# Patient Record
Sex: FEMALE | NOT HISPANIC OR LATINO | Employment: UNEMPLOYED | URBAN - METROPOLITAN AREA
[De-identification: names, ages, dates, MRNs, and addresses within clinical notes are randomized per-mention and may not be internally consistent; named-entity substitution may affect disease eponyms.]

---

## 2017-01-24 ENCOUNTER — APPOINTMENT (OUTPATIENT)
Dept: DIABETES SERVICES | Facility: HOSPITAL | Age: 77
End: 2017-01-24

## 2017-01-24 PROCEDURE — G0109 DIAB MANAGE TRN IND/GROUP: HCPCS

## 2017-02-14 PROBLEM — H40.10X0 OPEN-ANGLE GLAUCOMA: Status: ACTIVE | Noted: 2017-02-14

## 2017-02-17 ENCOUNTER — OFFICE VISIT (OUTPATIENT)
Dept: FAMILY MEDICINE CLINIC | Facility: CLINIC | Age: 77
End: 2017-02-17

## 2017-02-17 VITALS
BODY MASS INDEX: 28.22 KG/M2 | HEIGHT: 59 IN | OXYGEN SATURATION: 99 % | SYSTOLIC BLOOD PRESSURE: 110 MMHG | WEIGHT: 140 LBS | TEMPERATURE: 97.4 F | HEART RATE: 77 BPM | DIASTOLIC BLOOD PRESSURE: 80 MMHG

## 2017-02-17 DIAGNOSIS — M15.9 OSTEOARTHRITIS INVOLVING MULTIPLE JOINTS ON BOTH SIDES OF BODY: ICD-10-CM

## 2017-02-17 DIAGNOSIS — E11.9 DIABETES MELLITUS TYPE 2, NONINSULIN DEPENDENT (HCC): Primary | ICD-10-CM

## 2017-02-17 DIAGNOSIS — I10 ESSENTIAL HYPERTENSION: ICD-10-CM

## 2017-02-17 PROCEDURE — 99214 OFFICE O/P EST MOD 30 MIN: CPT | Performed by: FAMILY MEDICINE

## 2017-02-17 RX ORDER — NABUMETONE 750 MG/1
750 TABLET, FILM COATED ORAL 2 TIMES DAILY PRN
Qty: 60 TABLET | Refills: 5 | Status: SHIPPED | OUTPATIENT
Start: 2017-02-17 | End: 2017-09-05 | Stop reason: SDUPTHER

## 2017-02-17 RX ORDER — MONTELUKAST SODIUM 10 MG/1
10 TABLET ORAL NIGHTLY
Qty: 30 TABLET | Refills: 5 | Status: SHIPPED | OUTPATIENT
Start: 2017-02-17 | End: 2017-09-05 | Stop reason: SDUPTHER

## 2017-02-17 RX ORDER — LOSARTAN POTASSIUM AND HYDROCHLOROTHIAZIDE 25; 100 MG/1; MG/1
1 TABLET ORAL DAILY
Qty: 30 TABLET | Refills: 5 | Status: SHIPPED | OUTPATIENT
Start: 2017-02-17 | End: 2017-09-05 | Stop reason: SDUPTHER

## 2017-02-17 RX ORDER — AMLODIPINE BESYLATE 5 MG/1
5 TABLET ORAL DAILY
Qty: 30 TABLET | Refills: 5 | Status: SHIPPED | OUTPATIENT
Start: 2017-02-17 | End: 2017-09-05 | Stop reason: SDUPTHER

## 2017-02-17 RX ORDER — LATANOPROST 50 UG/ML
1 SOLUTION/ DROPS OPHTHALMIC DAILY
COMMUNITY
Start: 2017-01-30 | End: 2022-01-25 | Stop reason: ALTCHOICE

## 2017-02-17 NOTE — PROGRESS NOTES
Subjective   Antonietta Gramajo is a 76 y.o. female.   Chief Complaint   Patient presents with   • Diabetes   • Hypertension   • Osteoarthritis       History of Present Illness     #1 diabetes type 2-diagnosed years ago. Patient is on Janumet 50/1000 twice a day. She did not tolerate Invokana in the past- it was making her nauseated and caused rash. She cannot take sulfa drugs. She had angioedema.   Patient did diabetes education after last office visit.  She is working on applying it to her lifestyle.  She started walking daily.  She walks 30-60 minutes a day.  AM sugars in the 170s.  FBS in the lab- 175. A1C at 8.2 from 8.8.   Eye exam-2/2017.  Patient is not on statin due to abdominal pain. Patient tried 3 different statins.      #2 hypertension-on losartan/HCTZ 100/25 mg a day and amlodipine 5 mg -she takes it every other day. She does not tolerate amlodipine 5 mg every day because it makes her very tired. Patient denies chest pain, shortness of breath, dizziness, palpitations. No change with LE edema. Kidney tests are normal.      #3 osteoarthritis- shoulders hands and knees. Pain is achy, it comes and goes. It is better with nabumetone. Patient is using it as needed, twice a day. If she forgets to take it she gets pain. It helps her to function. She had seen no blood in stool, no black stools. She reports no abdominal pain.     The following portions of the patient's history were reviewed and updated as appropriate: allergies, current medications, past family history, past medical history, past social history and problem list.    Review of Systems   Constitutional: Negative.    Respiratory: Negative.    Cardiovascular: Negative.    Gastrointestinal: Negative.          Objective   Wt Readings from Last 3 Encounters:   02/17/17 140 lb (63.5 kg)   09/16/16 146 lb (66.2 kg)   10/16/15 149 lb 0.1 oz (67.6 kg)      Vitals:    02/17/17 1306   BP: 110/80   Pulse: 77   Temp: 97.4 °F (36.3 °C)   SpO2: 99%     Temp Readings  from Last 3 Encounters:   02/17/17 97.4 °F (36.3 °C)   09/16/16 97.6 °F (36.4 °C)   10/16/15 98.3 °F (36.8 °C)     BP Readings from Last 3 Encounters:   02/17/17 110/80   09/16/16 128/74   10/16/15 110/70     Pulse Readings from Last 3 Encounters:   02/17/17 77   09/16/16 78   10/16/15 82       Physical Exam   Constitutional: She is oriented to person, place, and time. She appears well-developed and well-nourished.   HENT:   Head: Normocephalic and atraumatic.   Neck: Neck supple. Carotid bruit is not present. No thyromegaly present.   Cardiovascular: Normal rate, regular rhythm, normal heart sounds and intact distal pulses.    Pulmonary/Chest: Effort normal and breath sounds normal.   Neurological: She is alert and oriented to person, place, and time.   Skin: Skin is warm, dry and intact.   Psychiatric: She has a normal mood and affect. Her behavior is normal.       Assessment/Plan   Antonietta was seen today for diabetes, hypertension and osteoarthritis.    Diagnoses and all orders for this visit:    Diabetes mellitus type 2, noninsulin dependent  -     Ambulatory Referral to Endocrinology    Essential hypertension    Osteoarthritis involving multiple joints on both sides of body    Other orders  -     nabumetone (RELAFEN) 750 MG tablet; Take 1 tablet by mouth 2 (Two) Times a Day As Needed for mild pain (1-3).  -     losartan-hydrochlorothiazide (HYZAAR) 100-25 MG per tablet; Take 1 tablet by mouth Daily.  -     sitaGLIPtin-metFORMIN (JANUMET)  MG per tablet; Take 1 tablet by mouth 2 (Two) Times a Day With Meals.  -     amLODIPine (NORVASC) 5 MG tablet; Take 1 tablet by mouth Daily.  -     montelukast (SINGULAIR) 10 MG tablet; Take 1 tablet by mouth Every Night.        #1 diabetes type [id number] to endocrinologist.    #2 hypertension-controlled.  Continue same.  Follow-up in 6 months.    #3 osteoarthritis-continue current treatment.  Side effects reviewed.  Follow-up in 6 months.    #4 allergic  rhinitis-continue same.

## 2017-04-10 ENCOUNTER — OFFICE VISIT (OUTPATIENT)
Dept: ENDOCRINOLOGY | Age: 77
End: 2017-04-10

## 2017-04-10 VITALS
DIASTOLIC BLOOD PRESSURE: 76 MMHG | WEIGHT: 139 LBS | BODY MASS INDEX: 28.02 KG/M2 | SYSTOLIC BLOOD PRESSURE: 124 MMHG | HEIGHT: 59 IN

## 2017-04-10 DIAGNOSIS — IMO0002 UNCONTROLLED TYPE 2 DIABETES MELLITUS WITH COMPLICATION, WITHOUT LONG-TERM CURRENT USE OF INSULIN: Primary | ICD-10-CM

## 2017-04-10 DIAGNOSIS — I10 ESSENTIAL HYPERTENSION: ICD-10-CM

## 2017-04-10 DIAGNOSIS — E67.3 HYPERVITAMINOSIS D: ICD-10-CM

## 2017-04-10 PROCEDURE — 99204 OFFICE O/P NEW MOD 45 MIN: CPT | Performed by: NURSE PRACTITIONER

## 2017-04-10 RX ORDER — LANCETS
EACH MISCELLANEOUS
Qty: 100 EACH | Refills: 5 | Status: SHIPPED | OUTPATIENT
Start: 2017-04-10 | End: 2017-06-02

## 2017-04-10 RX ORDER — EMPAGLIFLOZIN 10 MG/1
10 TABLET, FILM COATED ORAL EVERY MORNING
Qty: 30 TABLET | Refills: 3 | Status: SHIPPED | OUTPATIENT
Start: 2017-04-10 | End: 2017-09-07 | Stop reason: SDUPTHER

## 2017-04-10 NOTE — PROGRESS NOTES
Antonietta Gramajo who presents for for evaluation and treatment of Type 2 diabetes mellitus insulin dependent.The initial diagnosis of diabetes was made 7-8 years ago.     The condition of diabetes location is system with the course being clinical course has fluctuated , the severity is Mild , and the modifying/allievating factors are  oral medications. Insulin dosage review with Antonietta Gramajo suggested compliance most of the time   .       Associated symptoms of hyperglycemia have been none.  Associated symptoms of hypoglycemia have been jitteriness and weakness and fatigue. Patient reports  hypoglycemia threshold for symptoms is 60 mg/dl .       The patient is currently taking home blood tests - Blood glucose testin-1 times daily, that are: fasting- 1st thing in morning before eating or drinking     Compliance with blood glucose monitoring: poor.  Exercise:intermittently with meal panning: The patient is using no plan.    Home blood glucose testing daily: 0-1  FB to 210 mg/dl *patient states she onlyt tests maybe 1x weekly.    Patterns reported per patient are that she doesn't test her B/S but once weekly if that.      The following portions of the patient's history were reviewed and updated as appropriate: current medications, past family history, past medical history, past social history, past surgical history and problem list.        Medical records, chart, and labs reviewed from primary care provider.      Current Outpatient Prescriptions:   •  albuterol (PROAIR HFA) 108 (90 BASE) MCG/ACT inhaler, ProAir  (90 Base) MCG/ACT Inhalation Aerosol Solution; Patient Sig: ProAir  (90 Base) MCG/ACT Inhalation Aerosol Solution ; 0; -Oct-2014; Active, Disp: , Rfl:   •  amLODIPine (NORVASC) 5 MG tablet, Take 1 tablet by mouth Daily., Disp: 30 tablet, Rfl: 5  •  budesonide-formoterol (SYMBICORT) 160-4.5 MCG/ACT inhaler, Symbicort 160-4.5 MCG/ACT Inhalation Aerosol; Patient Sig: Symbicort 160-4.5  MCG/ACT Inhalation Aerosol ; 0; 20-Oct-2014; Active, Disp: , Rfl:   •  latanoprost (XALATAN) 0.005 % ophthalmic solution, , Disp: , Rfl:   •  losartan-hydrochlorothiazide (HYZAAR) 100-25 MG per tablet, Take 1 tablet by mouth Daily., Disp: 30 tablet, Rfl: 5  •  montelukast (SINGULAIR) 10 MG tablet, Take 1 tablet by mouth Every Night., Disp: 30 tablet, Rfl: 5  •  nabumetone (RELAFEN) 750 MG tablet, Take 1 tablet by mouth 2 (Two) Times a Day As Needed for mild pain (1-3)., Disp: 60 tablet, Rfl: 5  •  Omeprazole ([employer] OMEPRAZOLE) 20 MG tablet delayed-release, Take  by mouth., Disp: , Rfl:   •  sitaGLIPtin-metFORMIN (JANUMET)  MG per tablet, Take 1 tablet by mouth 2 (Two) Times a Day With Meals., Disp: 180 tablet, Rfl: 0  •  Triamcinolone Acetonide (NASACORT) 55 MCG/ACT nasal inhaler, 2 sprays into each nostril daily., Disp: , Rfl:   •  glucose blood test strip, Check blood glucose 3-4 times dialy, Disp: 100 each, Rfl: 4  •  JARDIANCE 10 MG tablet, Take 10 mg by mouth Every Morning., Disp: 30 tablet, Rfl: 3  •  Lancets (ONETOUCH ULTRASOFT) lancets, Check BG 3 times daily, Disp: 100 each, Rfl: 5    Patient Active Problem List    Diagnosis   • Uncontrolled type 2 diabetes mellitus with complication, without long-term current use of insulin [E11.8, E11.65]   • Open-angle glaucoma [H40.10X0]     Overview Note:     dgn 2/4/17. Dr Mcdowell.     • Allergic rhinitis [J30.9]   • Acid reflux [K21.9]   • Essential hypertension [I10]   • Osteoarthritis of knee [M17.9]   • Diabetes mellitus type 2, noninsulin dependent [E11.9]   • Avitaminosis D [E55.9]       Review of Systems  A comprehensive review of the 14 systems was negative except of listed below:  Constitutional: fatigue and weight loss 5 months- steadly losing. no appetite* lb in 5 months**   Eyes: positive for visual disturbance  Genitourinary:positive for frequency and nocturia  Musculoskeletal:positive for muscle weakness and myalgias - location is  "generalized  Endocrine: diabetic symptoms including blurry vision, increased fatigue, polydipsia, polyuria and weight loss  temperature  cold intolerance  weight loss  hyperglycemia     Objective:     Wt Readings from Last 3 Encounters:   04/10/17 139 lb (63 kg)   02/17/17 140 lb (63.5 kg)   09/16/16 146 lb (66.2 kg)     Temp Readings from Last 3 Encounters:   02/17/17 97.4 °F (36.3 °C)   09/16/16 97.6 °F (36.4 °C)   10/16/15 98.3 °F (36.8 °C)     BP Readings from Last 3 Encounters:   04/10/17 124/76   02/17/17 110/80   09/16/16 128/74     Pulse Readings from Last 3 Encounters:   02/17/17 77   09/16/16 78   10/16/15 82         /76  Ht 59\" (149.9 cm)  Wt 139 lb (63 kg)  BMI 28.07 kg/m2    General appearance:  alert, cooperative,orientated, , fatigued, nourished\" \"appears stated age and cooperative\" \"NAD   Neck: no carotid bruit, supple, symmetrical, trachea midline and thyroid not enlarged, symmetric, no tenderness/mass/nodules   Thyroid:  no palpable nodule, no tenderness, no enlargement,    Lung:  \"clear to auscultation bilaterally\" \"no abnormal breath sounds  \" effort was normal, no labored breath, no use of accessory muscles.   Heart: regular rate and rhythm, S1, S2 normal, no murmur, click, rub or gallop      Abdomen:  normal bowel sounds- 4 quads, soft non-tender and contour - slt rounded      Extremities: [extremities normal, atraumatic, no cyanosis or edema\" WNL - gait and station, strength and stability\"          Skin:   Pulses:  warm and dry, no hyperpigmentation, normal skin coloring, or suspicious lesions  2+ and symmetric   Neuro: Alert and oriented x3. Gait normal. Reflexes and motor strength normal and symmetric. Cranial nerves 2-12 and sensation grossly intact.      Psych: behavior - normal, judgement - normal, mood - normal, affect - normal               Diabetic foot exam:   Left: Filament test present   Pulses Dorsalis Pedis:  present  Posterior Tibial:  present   Reflexes 3+    Vibratory " sensation normal   Proprioception normal   Sharp/dull discrimination normal       Right: Filament test present   Pulses Dorsalis Pedis:  present  Posterior Tibial:  present   Reflexes 3+    Vibratory sensation normal   Proprioception normal   Sharp/dull discrimination normal    Lab Review  Results for orders placed or performed in visit on 12/16/16   Hemoglobin A1c   Result Value Ref Range    Hemoglobin A1C 8.20 (H) 4.80 - 5.60 %   Basic Metabolic Panel   Result Value Ref Range    Glucose 175 (H) 65 - 99 mg/dL    BUN 17 8 - 23 mg/dL    Creatinine 0.80 0.57 - 1.00 mg/dL    eGFR Non African Am 70 >60 mL/min/1.73    eGFR African Am 85 >60 mL/min/1.73    BUN/Creatinine Ratio 21.3 7.0 - 25.0    Sodium 142 136 - 145 mmol/L    Potassium 4.2 3.5 - 5.2 mmol/L    Chloride 99 98 - 107 mmol/L    Total CO2 25.8 22.0 - 29.0 mmol/L    Calcium 9.8 8.6 - 10.5 mg/dL         Assessment:   Antonietta was seen today for diabetes.    Diagnoses and all orders for this visit:    Uncontrolled type 2 diabetes mellitus with complication, without long-term current use of insulin  -     Comprehensive Metabolic Panel  -     C-Peptide  -     Hemoglobin A1c  -     Insulin, Total  -     Lipid Panel  -     Microalbumin / Creatinine Urine Ratio  -     TSH  -     Thyroid Antibodies  -     T4, Free  -     T4  -     T3, Free  -     T3  -     Vitamin D 25 Hydroxy  -     JARDIANCE 10 MG tablet; Take 10 mg by mouth Every Morning.  -     Lancets (ONETOUCH ULTRASOFT) lancets; Check BG 3 times daily  -     glucose blood test strip; Check blood glucose 3-4 times dialy    Essential hypertension  -     Comprehensive Metabolic Panel  -     C-Peptide  -     Hemoglobin A1c  -     Insulin, Total  -     Lipid Panel  -     Microalbumin / Creatinine Urine Ratio  -     TSH  -     Thyroid Antibodies  -     T4, Free  -     T4  -     T3, Free  -     T3    Hypervitaminosis D   -     Vitamin D 25 Hydroxy          Plan:    In Summary:   This is a type II diabetic patient that  reports to the office as a new patient to be evaluated and treated.  She reports that she was diagnosed approximately 8 years ago.  She has not kept up with her diabetes and does not check her blood sugars on a regular basis.  She reports minimal symptoms of hyperglycemia with the exception of within the last month she is having more visual changes and increased fatigue.he denies any family history she denies any family history of diabetes, but does have significant cancer and both brother and sister with cardiovascular history with mother and father.  Her surgical history was reviewed as well as her medical history of cataracts diverticulitis and skin cancer.  Her problem and diagnosis list is essential hypertension, allergic rhinitis, GERD item and D uncontrolled type 2 diabetes open angle glycol, and osteoarthritis.  Patient's last laboratory workup was in December 2016.  With no blood glucose checking on a weekly basis.  Her primary care notes were evaluated as well as her history.  At this time we will obtain a detailed laboratory workup and treat as indicated with those results.  The medication changes today were as follows:Start the Jardiance 10mg - agree to try this medication even with the rash with Invokana and start the Janumet 50/1000mg twice daily this and dinner.  We will have her return in 2 weeks to evaluate all lab work and determine basal insulin needs and further treatment options.      Education:  interpretation of lab results, blood sugar goals, complications of diabetes mellitus, hypoglycemia prevention and treatment, exercise, illness management, self-monitoring of blood glucose skills, nutrition and carbohydrate counting    home blood tests -  Blood glucose testing: 3-4 times daily, that are: fasting- 1st thing in morning before eating or drinking, before each meal and 1 or 2 hours after meal  Bedtime, anytime you feel symptoms of hyperglycemia or hypoglycemia (high or low blood sugars)        Return in about 2 weeks (around 4/24/2017).    Prior authorization information.  Patient has had trouble with Invokana in the past-rash only.  Actos gastrointestinal problems.  Metformin gastrointestinal problems.  Can't take Janumet with no problems.    Dragon transcription disclaimer     Much of this encounter note is an electronic transcription/translation of spoken language to printed text. The electronic translation of spoken language may permit erroneous, or at times, nonsensical words or phrases to be inadvertently transcribed. Although I have reviewed the note for such errors, some may still exist.

## 2017-04-10 NOTE — PATIENT INSTRUCTIONS
home blood tests -  Blood glucose testing: 3-4 times daily, that are: fasting- 1st thing in morning before eating or drinking, before each meal and 1 or 2 hours after meal  Bedtime, anytime you feel symptoms of hyperglycemia or hypoglycemia (high or low blood sugars)     Start the Jardiance 10mg  Start the Janumet 50/1000mg twice daily this and dinner

## 2017-04-11 LAB
25(OH)D3+25(OH)D2 SERPL-MCNC: 30.2 NG/ML (ref 30–100)
ALBUMIN SERPL-MCNC: 4.3 G/DL (ref 3.5–5.2)
ALBUMIN/CREAT UR: 16.2 MG/G CREAT (ref 0–30)
ALBUMIN/GLOB SERPL: 1.3 G/DL
ALP SERPL-CCNC: 93 U/L (ref 39–117)
ALT SERPL-CCNC: 18 U/L (ref 1–33)
AST SERPL-CCNC: 16 U/L (ref 1–32)
BILIRUB SERPL-MCNC: 0.3 MG/DL (ref 0.1–1.2)
BUN SERPL-MCNC: 19 MG/DL (ref 8–23)
BUN/CREAT SERPL: 22.9 (ref 7–25)
C PEPTIDE SERPL-MCNC: 2.7 NG/ML (ref 1.1–4.4)
CALCIUM SERPL-MCNC: 10.2 MG/DL (ref 8.6–10.5)
CHLORIDE SERPL-SCNC: 100 MMOL/L (ref 98–107)
CHOLEST SERPL-MCNC: 193 MG/DL (ref 0–200)
CO2 SERPL-SCNC: 25.2 MMOL/L (ref 22–29)
CREAT SERPL-MCNC: 0.83 MG/DL (ref 0.57–1)
CREAT UR-MCNC: 39.5 MG/DL
GLOBULIN SER CALC-MCNC: 3.2 GM/DL
GLUCOSE SERPL-MCNC: 178 MG/DL (ref 65–99)
HBA1C MFR BLD: 7.75 % (ref 4.8–5.6)
HDLC SERPL-MCNC: 73 MG/DL (ref 40–60)
INSULIN SERPL-ACNC: 8.7 UIU/ML (ref 2.6–24.9)
LDLC SERPL CALC-MCNC: 93 MG/DL (ref 0–100)
MICROALBUMIN UR-MCNC: 6.4 UG/ML
POTASSIUM SERPL-SCNC: 3.8 MMOL/L (ref 3.5–5.2)
PROT SERPL-MCNC: 7.5 G/DL (ref 6–8.5)
SODIUM SERPL-SCNC: 141 MMOL/L (ref 136–145)
T3 SERPL-MCNC: 79.6 NG/DL (ref 80–200)
T3FREE SERPL-MCNC: 2.2 PG/ML (ref 2–4.4)
T4 FREE SERPL-MCNC: 1.79 NG/DL (ref 0.93–1.7)
T4 SERPL-MCNC: 9.05 MCG/DL (ref 4.5–11.7)
THYROGLOB AB SERPL-ACNC: <1 IU/ML (ref 0–0.9)
THYROPEROXIDASE AB SERPL-ACNC: 12 IU/ML (ref 0–34)
TRIGL SERPL-MCNC: 134 MG/DL (ref 0–150)
TSH SERPL DL<=0.005 MIU/L-ACNC: 1.08 MIU/ML (ref 0.27–4.2)
VLDLC SERPL CALC-MCNC: 26.8 MG/DL (ref 5–40)

## 2017-06-02 ENCOUNTER — TELEPHONE (OUTPATIENT)
Dept: ENDOCRINOLOGY | Age: 77
End: 2017-06-02

## 2017-06-02 RX ORDER — LANCETS 33 GAUGE
EACH MISCELLANEOUS
Qty: 100 EACH | Refills: 5 | Status: SHIPPED | OUTPATIENT
Start: 2017-06-02

## 2017-06-02 NOTE — TELEPHONE ENCOUNTER
----- Message from Isabelle Humphries sent at 6/1/2017  9:28 AM EDT -----  Contact: [facility]ST SAYS THAT A SCRIPT FOR ONE TOUCH ULTRA SOFT LANCETS WAS SENT TO THEM BUT THE PATIENT USES ONE TOUCH DELICA LANCETS. SINCE THIS IS MEDICARE, THE SCRIPT NEEDS TO BE RESENT WITH THE NUMBER OF TIMES SHE TESTS, THE CORRECT BRAND OF LANCETS AND THE DIAGNOSIS CODE. PLEASE SEND THIS TO:  ALESS[facility] - [id number] [facility] [place]   [phone] (Phone)  [phone] (Fax)

## 2017-07-07 RX ORDER — SITAGLIPTIN AND METFORMIN HYDROCHLORIDE 1000; 50 MG/1; MG/1
TABLET, FILM COATED ORAL
Qty: 60 TABLET | Refills: 0 | Status: SHIPPED | OUTPATIENT
Start: 2017-07-07 | End: 2017-08-08 | Stop reason: SDUPTHER

## 2017-08-08 RX ORDER — SITAGLIPTIN AND METFORMIN HYDROCHLORIDE 1000; 50 MG/1; MG/1
TABLET, FILM COATED ORAL
Qty: 60 TABLET | Refills: 0 | Status: SHIPPED | OUTPATIENT
Start: 2017-08-08 | End: 2017-09-12 | Stop reason: SDUPTHER

## 2017-08-29 DIAGNOSIS — I10 ESSENTIAL HYPERTENSION: Primary | ICD-10-CM

## 2017-08-29 DIAGNOSIS — I10 ESSENTIAL HYPERTENSION: ICD-10-CM

## 2017-08-30 LAB
BUN SERPL-MCNC: 26 MG/DL (ref 8–23)
BUN/CREAT SERPL: 23.6 (ref 7–25)
CALCIUM SERPL-MCNC: 9.7 MG/DL (ref 8.6–10.5)
CHLORIDE SERPL-SCNC: 98 MMOL/L (ref 98–107)
CO2 SERPL-SCNC: 27 MMOL/L (ref 22–29)
CREAT SERPL-MCNC: 1.1 MG/DL (ref 0.57–1)
GLUCOSE SERPL-MCNC: 143 MG/DL (ref 65–99)
POTASSIUM SERPL-SCNC: 4.5 MMOL/L (ref 3.5–5.2)
SODIUM SERPL-SCNC: 140 MMOL/L (ref 136–145)

## 2017-09-05 ENCOUNTER — OFFICE VISIT (OUTPATIENT)
Dept: INTERNAL MEDICINE | Facility: CLINIC | Age: 77
End: 2017-09-05

## 2017-09-05 VITALS
DIASTOLIC BLOOD PRESSURE: 64 MMHG | OXYGEN SATURATION: 98 % | WEIGHT: 139 LBS | TEMPERATURE: 98.4 F | SYSTOLIC BLOOD PRESSURE: 124 MMHG | HEART RATE: 78 BPM | HEIGHT: 59 IN | BODY MASS INDEX: 28.02 KG/M2

## 2017-09-05 DIAGNOSIS — I10 ESSENTIAL HYPERTENSION: Primary | ICD-10-CM

## 2017-09-05 DIAGNOSIS — M17.0 OSTEOARTHRITIS OF BOTH KNEES, UNSPECIFIED OSTEOARTHRITIS TYPE: ICD-10-CM

## 2017-09-05 PROCEDURE — G0009 ADMIN PNEUMOCOCCAL VACCINE: HCPCS | Performed by: FAMILY MEDICINE

## 2017-09-05 PROCEDURE — 99213 OFFICE O/P EST LOW 20 MIN: CPT | Performed by: FAMILY MEDICINE

## 2017-09-05 PROCEDURE — 90670 PCV13 VACCINE IM: CPT | Performed by: FAMILY MEDICINE

## 2017-09-05 RX ORDER — LOSARTAN POTASSIUM AND HYDROCHLOROTHIAZIDE 25; 100 MG/1; MG/1
1 TABLET ORAL DAILY
Qty: 30 TABLET | Refills: 5 | Status: SHIPPED | OUTPATIENT
Start: 2017-09-05 | End: 2018-02-17 | Stop reason: SDUPTHER

## 2017-09-05 RX ORDER — NABUMETONE 750 MG/1
750 TABLET, FILM COATED ORAL 2 TIMES DAILY PRN
Qty: 60 TABLET | Refills: 5 | Status: SHIPPED | OUTPATIENT
Start: 2017-09-05 | End: 2018-03-06 | Stop reason: SDUPTHER

## 2017-09-05 RX ORDER — MONTELUKAST SODIUM 10 MG/1
10 TABLET ORAL NIGHTLY
Qty: 30 TABLET | Refills: 5 | Status: SHIPPED | OUTPATIENT
Start: 2017-09-05 | End: 2018-03-06 | Stop reason: SDUPTHER

## 2017-09-05 RX ORDER — AMLODIPINE BESYLATE 5 MG/1
5 TABLET ORAL DAILY
Qty: 30 TABLET | Refills: 5 | Status: SHIPPED | OUTPATIENT
Start: 2017-09-05 | End: 2018-03-06 | Stop reason: SDUPTHER

## 2017-09-05 NOTE — PROGRESS NOTES
Subjective   Antonietta Gramajo is a 77 y.o. female.   Chief Complaint   Patient presents with   • Osteoarthritis   • Hypertension       History of Present Illness     #1 hypertension-on losartan/HCTZ 100/25 mg a day and amlodipine 5 mg -she takes it every other day. She does not tolerate amlodipine 5 mg every day because it makes her very tired. Patient denies chest pain, shortness of breath, dizziness, palpitations. No change with LE edema. Creatinine 1.1, but BUN is at 26.  She walks almost daily for an hour.      #2 osteoarthritis- shoulders hands and knees. Pain is achy, it comes and goes. It is better with nabumetone. Patient is using it as needed, twice a day. If she forgets to take it she gets pain. It helps her to function. She had seen no blood in stool, no black stools. She reports no abdominal pain.       The following portions of the patient's history were reviewed and updated as appropriate: allergies, current medications, past family history, past medical history, past social history, past surgical history and problem list.    Review of Systems   Constitutional: Negative.    Respiratory: Negative.    Cardiovascular: Negative.    Musculoskeletal: Positive for arthralgias.   Psychiatric/Behavioral: Negative.          Objective   Wt Readings from Last 3 Encounters:   09/05/17 139 lb (63 kg)   04/10/17 139 lb (63 kg)   02/17/17 140 lb (63.5 kg)      Vitals:    09/05/17 1255   BP: 124/64   Pulse: 78   Temp: 98.4 °F (36.9 °C)   SpO2: 98%     Temp Readings from Last 3 Encounters:   09/05/17 98.4 °F (36.9 °C)   02/17/17 97.4 °F (36.3 °C)   09/16/16 97.6 °F (36.4 °C)     BP Readings from Last 3 Encounters:   09/05/17 124/64   04/10/17 124/76   02/17/17 110/80     Pulse Readings from Last 3 Encounters:   09/05/17 78   02/17/17 77   09/16/16 78       Physical Exam   Constitutional: She is oriented to person, place, and time. She appears well-developed and well-nourished.   HENT:   Head: Normocephalic and  atraumatic.   Neck: Neck supple. Carotid bruit is not present. No thyromegaly present.   Cardiovascular: Normal rate, regular rhythm and normal heart sounds.    Pulmonary/Chest: Effort normal and breath sounds normal.   Neurological: She is alert and oriented to person, place, and time.   Skin: Skin is warm, dry and intact.   Psychiatric: She has a normal mood and affect. Her behavior is normal.       Assessment/Plan   Antonietta was seen today for osteoarthritis and hypertension.    Diagnoses and all orders for this visit:    Essential hypertension    Osteoarthritis of both knees, unspecified osteoarthritis type    Other orders  -     nabumetone (RELAFEN) 750 MG tablet; Take 1 tablet by mouth 2 (Two) Times a Day As Needed for Mild Pain .  -     montelukast (SINGULAIR) 10 MG tablet; Take 1 tablet by mouth Every Night.  -     losartan-hydrochlorothiazide (HYZAAR) 100-25 MG per tablet; Take 1 tablet by mouth Daily.  -     amLODIPine (NORVASC) 5 MG tablet; Take 1 tablet by mouth Daily.      #1 hypertension-controlled.  Continue current treatment.  Follow-up in 6 months.      #2 osteoarthritis-will continue current treatment.  Risks of kidney failure, bleeding and cardiovascular risks.  Will monitor kidney tests.

## 2017-09-07 DIAGNOSIS — IMO0002 UNCONTROLLED TYPE 2 DIABETES MELLITUS WITH COMPLICATION, WITHOUT LONG-TERM CURRENT USE OF INSULIN: ICD-10-CM

## 2017-09-08 ENCOUNTER — TELEPHONE (OUTPATIENT)
Dept: INTERNAL MEDICINE | Facility: CLINIC | Age: 77
End: 2017-09-08

## 2017-09-08 RX ORDER — EMPAGLIFLOZIN 10 MG/1
TABLET, FILM COATED ORAL
Qty: 30 TABLET | Refills: 0 | Status: SHIPPED | OUTPATIENT
Start: 2017-09-08 | End: 2017-10-06 | Stop reason: SDUPTHER

## 2017-09-08 RX ORDER — SITAGLIPTIN AND METFORMIN HYDROCHLORIDE 1000; 50 MG/1; MG/1
TABLET, FILM COATED ORAL
Qty: 60 TABLET | Refills: 0 | OUTPATIENT
Start: 2017-09-08

## 2017-09-08 NOTE — TELEPHONE ENCOUNTER
----- Message from Gem Rhodes sent at 9/8/2017  2:50 PM EDT -----  Regarding: MED REFILL ?  Patient saw Dr Catherine this week and she noticed taht she didn't renew her Janumet. She wants to know if she is discontinuing it. Pauline Kwong has started her on Jardiance. Please call her to discuss it with her. Thanks

## 2017-10-06 DIAGNOSIS — IMO0002 UNCONTROLLED TYPE 2 DIABETES MELLITUS WITH COMPLICATION, WITHOUT LONG-TERM CURRENT USE OF INSULIN: ICD-10-CM

## 2017-10-06 RX ORDER — EMPAGLIFLOZIN 10 MG/1
TABLET, FILM COATED ORAL
Qty: 30 TABLET | Refills: 0 | Status: SHIPPED | OUTPATIENT
Start: 2017-10-06 | End: 2017-10-31

## 2017-10-17 DIAGNOSIS — E55.9 AVITAMINOSIS D: ICD-10-CM

## 2017-10-17 DIAGNOSIS — I10 ESSENTIAL HYPERTENSION: Primary | ICD-10-CM

## 2017-10-17 DIAGNOSIS — IMO0002 UNCONTROLLED TYPE 2 DIABETES MELLITUS WITH COMPLICATION, WITHOUT LONG-TERM CURRENT USE OF INSULIN: ICD-10-CM

## 2017-10-19 ENCOUNTER — LAB (OUTPATIENT)
Dept: ENDOCRINOLOGY | Age: 77
End: 2017-10-19

## 2017-10-19 DIAGNOSIS — E55.9 AVITAMINOSIS D: ICD-10-CM

## 2017-10-19 DIAGNOSIS — I10 ESSENTIAL HYPERTENSION: ICD-10-CM

## 2017-10-19 DIAGNOSIS — IMO0002 UNCONTROLLED TYPE 2 DIABETES MELLITUS WITH COMPLICATION, WITHOUT LONG-TERM CURRENT USE OF INSULIN: ICD-10-CM

## 2017-10-20 LAB
25(OH)D3+25(OH)D2 SERPL-MCNC: 35 NG/ML (ref 30–100)
ALBUMIN SERPL-MCNC: 4.6 G/DL (ref 3.5–5.2)
ALBUMIN/GLOB SERPL: 1.6 G/DL
ALP SERPL-CCNC: 108 U/L (ref 39–117)
ALT SERPL-CCNC: 24 U/L (ref 1–33)
AST SERPL-CCNC: 22 U/L (ref 1–32)
BILIRUB SERPL-MCNC: 0.5 MG/DL (ref 0.1–1.2)
BUN SERPL-MCNC: 22 MG/DL (ref 8–23)
BUN/CREAT SERPL: 18.6 (ref 7–25)
C PEPTIDE SERPL-MCNC: 2.1 NG/ML (ref 1.1–4.4)
CALCIUM SERPL-MCNC: 10.7 MG/DL (ref 8.6–10.5)
CHLORIDE SERPL-SCNC: 98 MMOL/L (ref 98–107)
CHOLEST SERPL-MCNC: 241 MG/DL (ref 0–200)
CO2 SERPL-SCNC: 26.4 MMOL/L (ref 22–29)
CREAT SERPL-MCNC: 1.18 MG/DL (ref 0.57–1)
GFR SERPLBLD CREATININE-BSD FMLA CKD-EPI: 44 ML/MIN/1.73
GFR SERPLBLD CREATININE-BSD FMLA CKD-EPI: 54 ML/MIN/1.73
GLOBULIN SER CALC-MCNC: 2.9 GM/DL
GLUCOSE SERPL-MCNC: 180 MG/DL (ref 65–99)
HBA1C MFR BLD: 8.18 % (ref 4.8–5.6)
HDLC SERPL-MCNC: 75 MG/DL (ref 40–60)
LDLC SERPL CALC-MCNC: 129 MG/DL (ref 0–100)
POTASSIUM SERPL-SCNC: 3.7 MMOL/L (ref 3.5–5.2)
PROT SERPL-MCNC: 7.5 G/DL (ref 6–8.5)
SODIUM SERPL-SCNC: 141 MMOL/L (ref 136–145)
TRIGL SERPL-MCNC: 187 MG/DL (ref 0–150)
VLDLC SERPL CALC-MCNC: 37.4 MG/DL (ref 5–40)

## 2017-10-31 ENCOUNTER — OFFICE VISIT (OUTPATIENT)
Dept: ENDOCRINOLOGY | Age: 77
End: 2017-10-31

## 2017-10-31 VITALS
DIASTOLIC BLOOD PRESSURE: 68 MMHG | BODY MASS INDEX: 28.22 KG/M2 | WEIGHT: 140 LBS | HEIGHT: 59 IN | SYSTOLIC BLOOD PRESSURE: 114 MMHG

## 2017-10-31 DIAGNOSIS — I10 ESSENTIAL HYPERTENSION: ICD-10-CM

## 2017-10-31 DIAGNOSIS — IMO0002 UNCONTROLLED TYPE 2 DIABETES MELLITUS WITH COMPLICATION, WITHOUT LONG-TERM CURRENT USE OF INSULIN: Primary | ICD-10-CM

## 2017-10-31 DIAGNOSIS — E67.3 HYPERVITAMINOSIS D: ICD-10-CM

## 2017-10-31 PROCEDURE — 99214 OFFICE O/P EST MOD 30 MIN: CPT | Performed by: NURSE PRACTITIONER

## 2017-10-31 RX ORDER — CYCLOBENZAPRINE HCL 10 MG
10 TABLET ORAL 3 TIMES DAILY PRN
COMMUNITY
End: 2021-01-14

## 2017-10-31 RX ORDER — PEN NEEDLE, DIABETIC 30 GX3/16"
31 NEEDLE, DISPOSABLE MISCELLANEOUS DAILY
Qty: 50 EACH | Refills: 4 | Status: SHIPPED | OUTPATIENT
Start: 2017-10-31 | End: 2018-12-07 | Stop reason: SDUPTHER

## 2017-10-31 NOTE — PROGRESS NOTES
Antonietta Gramajo  presents to the office  for the follow-up appointment for Type 2 diabetes mellitus.     The diabete's condition location is throughout the system with the  clinical course has fluctuated, the severity is Mild, and the modifying/allievating factors are oral medications.  Medications and  Dosages were  reviewed with Antonietta Gramajo and suggested that compliance most of the time.    Associated symptoms of hyperglycemia have been none.  Associated symptoms of hypoglycemia have been jitteriness and weakness. Patient reports  hypoglycemia threshold for symptoms is 60 mg/dl .     The patient is currently on oral medications .     Compliance with blood glucose monitoring: fair.     Meal panning: The patient is using avoidance of concentrated sweets.    The patient is currently taking home blood tests - Blood glucose testin times daily, that are:  fasting- 1st thing in morning before eating or drinking    Last instructions given were:   Start the Jardiance 10mg  Start the Janumet 50/1000mg twice daily this and dinner    Home blood glucose testing daily: 1  FB to 225 mg/dl   Patient states the 7 day average was 183.    Last reported blood glucose readings are:   Home blood glucose testing daily: 0-1  FB to 210 mg/dl *patient states she onlyt tests maybe 1x weekly.    Patterns reported per patient are that her BS fluctuates.         The following portions of the patient's history were reviewed and updated as appropriate: current medications, past family history, past medical history, past social history, past surgical history and problem list.      Current Outpatient Prescriptions:   •  albuterol (PROAIR HFA) 108 (90 BASE) MCG/ACT inhaler, ProAir  (90 Base) MCG/ACT Inhalation Aerosol Solution; Patient Sig: ProAir  (90 Base) MCG/ACT Inhalation Aerosol Solution ; 0; -Oct-2014; Active, Disp: , Rfl:   •  amLODIPine (NORVASC) 5 MG tablet, Take 1 tablet by mouth Daily., Disp: 30  tablet, Rfl: 5  •  cyclobenzaprine (FLEXERIL) 10 MG tablet, Take 10 mg by mouth 3 (Three) Times a Day As Needed for Muscle Spasms., Disp: , Rfl:   •  glucose blood test strip, Check blood glucose 3-4 times dialy, Disp: 100 each, Rfl: 4  •  latanoprost (XALATAN) 0.005 % ophthalmic solution, , Disp: , Rfl:   •  losartan-hydrochlorothiazide (HYZAAR) 100-25 MG per tablet, Take 1 tablet by mouth Daily., Disp: 30 tablet, Rfl: 5  •  montelukast (SINGULAIR) 10 MG tablet, Take 1 tablet by mouth Every Night., Disp: 30 tablet, Rfl: 5  •  nabumetone (RELAFEN) 750 MG tablet, Take 1 tablet by mouth 2 (Two) Times a Day As Needed for Mild Pain ., Disp: 60 tablet, Rfl: 5  •  Omeprazole ([facility] OMEPRAZOLE) 20 MG tablet delayed-release, Take  by mouth., Disp: , Rfl:   •  ONETOUCH DELICA LANCETS 33G misc, Check blood glucose 3 times daily DX CODE: e11.65, Disp: 100 each, Rfl: 5  •  Triamcinolone Acetonide (NASACORT) 55 MCG/ACT nasal inhaler, 2 sprays into each nostril daily., Disp: , Rfl:   •  Insulin Degludec 200 UNIT/ML solution pen-injector, Inject 20 Units under the skin Daily. Titrate as instructed with max of 50 units daily, Disp: 5 pen, Rfl: 4  •  Insulin Pen Needle (PEN NEEDLES) 31G X 5 MM misc, 31 g Daily. 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 Dr Mcdowell.     • Allergic rhinitis [J30.9]   • Acid reflux [K21.9]   • Essential  "hypertension [I10]   • Osteoarthritis of knee [M17.10]   • Diabetes mellitus type 2, noninsulin dependent [E11.9]   • Avitaminosis D [E55.9]       Review of Systems   A comprehensive review of the 14 systems was negative except of listed below:  Constitutional: fatigue  Eyes: positive for visual disturbance  Genitourinary:positive for frequency and nocturia  Musculoskeletal:positive for muscle cramping  Endocrine: diabetic symptoms including blurry vision, increased fatigue, polydipsia and polyphagia  hyperglycemia     Objective:     Wt Readings from Last 3 Encounters:   10/31/17 140 lb (63.5 kg)   09/05/17 139 lb (63 kg)   04/10/17 139 lb (63 kg)     Temp Readings from Last 3 Encounters:   09/05/17 98.4 °F (36.9 °C)   02/17/17 97.4 °F (36.3 °C)   09/16/16 97.6 °F (36.4 °C)     BP Readings from Last 3 Encounters:   10/31/17 114/68   09/05/17 124/64   04/10/17 124/76     Pulse Readings from Last 3 Encounters:   09/05/17 78   02/17/17 77   09/16/16 78        /68  Ht 59\" (149.9 cm)  Wt 140 lb (63.5 kg)  BMI 28.28 kg/m2    General appearance:  alert, cooperative, delirious, fatigued, nourished\" \"appears stated age and cooperative\" \"NAD   Neck: no carotid bruit, supple, symmetrical, trachea midline and thyroid not enlarged, symmetric, no tenderness/mass/nodules   Thyroid:  no palpable nodule, no enlargement, no tenderness   Lung:  \"clear to auscultation bilaterally\" \"no abnormal breath sounds  \" effort was normal, no labored breath, no use of accessory muscles.   Heart: regular rate and rhythm, S1, S2 normal, no murmur, click, rub or gallop      Abdomen:  normal bowel sounds- 4 quads, soft non-tender and contour - slt rounded      Extremities: extremities normal, atraumatic, no cyanosis or edema extremities normal, atraumatic, no cyanosis or edema\" WNL - gait and station, strength and stability\"       Skin:   Pulses:  warm and dry, no hyperpigmentation, normal skin coloring, or suspicious lesions   2+ and " symmetric   Neuro: Alert and oriented x3. Gait normal. Reflexes and motor strength normal and symmetric. Cranial nerves 2-12 and sensation grossly intact.      Psych: behavior - normal, judgement - normal, mood - normal, affect - normal                 Lab Review  Results for orders placed or performed in visit on 10/19/17   Comprehensive Metabolic Panel   Result Value Ref Range    Glucose 180 (H) 65 - 99 mg/dL    BUN 22 8 - 23 mg/dL    Creatinine 1.18 (H) 0.57 - 1.00 mg/dL    eGFR Non African Am 44 (L) >60 mL/min/1.73    eGFR African Am 54 (L) >60 mL/min/1.73    BUN/Creatinine Ratio 18.6 7.0 - 25.0    Sodium 141 136 - 145 mmol/L    Potassium 3.7 3.5 - 5.2 mmol/L    Chloride 98 98 - 107 mmol/L    Total CO2 26.4 22.0 - 29.0 mmol/L    Calcium 10.7 (H) 8.6 - 10.5 mg/dL    Total Protein 7.5 6.0 - 8.5 g/dL    Albumin 4.60 3.50 - 5.20 g/dL    Globulin 2.9 gm/dL    A/G Ratio 1.6 g/dL    Total Bilirubin 0.5 0.1 - 1.2 mg/dL    Alkaline Phosphatase 108 39 - 117 U/L    AST (SGOT) 22 1 - 32 U/L    ALT (SGPT) 24 1 - 33 U/L   C-Peptide   Result Value Ref Range    C-Peptide 2.1 1.1 - 4.4 ng/mL   Hemoglobin A1c   Result Value Ref Range    Hemoglobin A1C 8.18 (H) 4.80 - 5.60 %   Vitamin D 25 Hydroxy   Result Value Ref Range    25 Hydroxy, Vitamin D 35.0 30.0 - 100.0 ng/mL   Lipid Panel   Result Value Ref Range    Total Cholesterol 241 (H) 0 - 200 mg/dL    Triglycerides 187 (H) 0 - 150 mg/dL    HDL Cholesterol 75 (H) 40 - 60 mg/dL    VLDL Cholesterol 37.4 5 - 40 mg/dL    LDL Cholesterol  129 (H) 0 - 100 mg/dL           Assessment:   Antonietta was seen today for follow-up.    Diagnoses and all orders for this visit:    Uncontrolled type 2 diabetes mellitus with complication, without long-term current use of insulin  -     Insulin Degludec 200 UNIT/ML solution pen-injector; Inject 20 Units under the skin Daily. Titrate as instructed with max of 50 units daily  -     Liraglutide (VICTOZA) 18 MG/3ML solution pen-injector injection; Victoza  1.8mg SC daily - start .6mg SC daily x 1 week, then 1.2mg SC daily x1 week then maintain at 1.8 mg SC daily  -     SITagliptin-MetFORMIN HCl ER (JANUMET XR)  MG tablet sustained-release 24 hour; Take 1 daily in AM  -     Insulin Pen Needle (PEN NEEDLES) 31G X 5 MM misc; 31 g Daily. 1 injections daily    Hypervitaminosis D     Essential hypertension        Plan:   In Summary: Met with patient today, patient is metabolically stable and doing well.  Patient states that she is increased her exercise walking daily and watching her medical nutrition therapy diet.  When reviewing her lab work it shows that her A1c has increased as well as her cholesterol panel.  Her  GFR 48 mg/dl to 44mg/dl and this time we will discontinue Jardiance.  Discontinued Janumet 50/1000 twice a day and changed to Janumet 50/1000 extended release once a day.  The additional changes are as follows:    Medication changes:  New instructions for basal insulIn  Tresiba U200 20 units in AM   Titration instructions - increase AM dose 1 units every 1 days if fasting blood sugar is over 110mg/dl     Stop the Jardiance 10 mg in the morning    Stop the Janumet 50/1000 twice daily    Start Victoza as directed    home blood tests -  Blood glucose testing: 3 times daily, that are: fasting- 1st thing in morning before eating or drinking, before each meal and 1 or 2 hours after meal  Bedtime, anytime you feel symptoms of hyperglycemia or hypoglycemia (high or low blood sugars)        Education:  interpretation of lab results, blood sugar goals, complications of diabetes mellitus, hypoglycemia prevention and treatment, exercise, illness management, self-monitoring of blood glucose skills, nutrition, carbohydrate counting and site rotation        Office visit 25 minutes with additional education given 13 minutes - SMBG with goals, medication changes with purposes and s/e profiles. CGM- schedule IPRO      Return in about 3 months (around 1/31/2018), or if  symptoms worsen or fail to improve, for Recheck. 3 months with Pauline-1 week prior for labs 6 months with Dr. Holley-one week prior for labs        Dragon transcription disclaimer     Much of this encounter note is an electronic transcription/translation of spoken language to printed text. The electronic translation of spoken language may permit erroneous, or at times, nonsensical words or phrases to be inadvertently transcribed. Although I have reviewed the note for such errors, some may still exist.

## 2017-10-31 NOTE — PATIENT INSTRUCTIONS
New instructions for basal insulIn  Tresiba U200 20 units in AM   Titration instructions - increase AM dose 1 units every 1 days if fasting blood sugar is over 110mg/dl     Stop the Jardiance 10 mg in the morning    Stop the Janumet 50/1000 twice daily        Start Victoza as directed    home blood tests -  Blood glucose testing: 3 times daily, that are: fasting- 1st thing in morning before eating or drinking, before each meal and 1 or 2 hours after meal  Bedtime, anytime you feel symptoms of hyperglycemia or hypoglycemia (high or low blood sugars)

## 2017-11-02 ENCOUNTER — TELEPHONE (OUTPATIENT)
Dept: ENDOCRINOLOGY | Age: 77
End: 2017-11-02

## 2017-11-02 NOTE — TELEPHONE ENCOUNTER
----- Message from WALTER Parikh sent at 10/31/2017  5:22 PM EDT -----  Call this patient after she left I started looking at her labs more and we will discontinue the Janumet altogether she is only to take the 2 injections

## 2017-11-10 ENCOUNTER — TREATMENT (OUTPATIENT)
Dept: ENDOCRINOLOGY | Age: 77
End: 2017-11-10

## 2017-11-10 DIAGNOSIS — IMO0002 UNCONTROLLED TYPE 2 DIABETES MELLITUS WITH COMPLICATION, WITHOUT LONG-TERM CURRENT USE OF INSULIN: Primary | ICD-10-CM

## 2017-11-10 PROCEDURE — 95250 CONT GLUC MNTR PHYS/QHP EQP: CPT | Performed by: NURSE PRACTITIONER

## 2017-11-27 DIAGNOSIS — IMO0002 UNCONTROLLED TYPE 2 DIABETES MELLITUS WITH COMPLICATION, WITHOUT LONG-TERM CURRENT USE OF INSULIN: ICD-10-CM

## 2018-01-02 ENCOUNTER — TREATMENT (OUTPATIENT)
Dept: ENDOCRINOLOGY | Age: 78
End: 2018-01-02

## 2018-01-02 DIAGNOSIS — IMO0002 UNCONTROLLED TYPE 2 DIABETES MELLITUS WITH COMPLICATION, WITHOUT LONG-TERM CURRENT USE OF INSULIN: Primary | ICD-10-CM

## 2018-01-02 PROCEDURE — 95251 CONT GLUC MNTR ANALYSIS I&R: CPT | Performed by: NURSE PRACTITIONER

## 2018-01-02 NOTE — PROGRESS NOTES
The iPro Continuous Glucose Monitor is not implanted! A tiny glucose sensor at the end of the recorder will be inserted under the skin in a virtually painless process. Once it is inserted you will continue your daily routine as usual. Adverse reactions associated with glucose sensor insertion are highly uncommon and are limited to bleeding, irritation, pain, rash, infection, raised bump, and irritation at the site from the tape or bandage to secure the iPro CGM to the skin.    Glucose measurements are automatically collected and stored in the recorder. A total of 288 glucose readings- every 5 minutes throughout the day are recorded. Once your testing is complete, personalized reports will be generated allowing you and your doctor to fine tune your diabetes therapy. Remember, it does not display glucose values and is not intended to replace home glucose monitoring.    • Take blood glucose readings 4 times a day for the next 3 days  • Record these and daily events such as meals, insulin (if you take) and exercise in the Patient Logbook you are given.    o Test with your BG meter 1 hour after the start of your iPro study (and not before 1 hour) and record the exact time in your logbook.   o Test 4 times a day using your BG meter before breakfast, lunch, dinner and before bed and record the exact time in your logbook.     • Do not change any settings on your BG meter during the study and do not let anyone else use your meter during the study.    • Protect the iPro Recorder and glucose sensor site from accidental removal and refrain from contact sports or activities which may damage the monitor.     • If you are to have an x-ray, CT or MRI, the iPro needs to be removed prior to doing so.     • The iPro is waterproof, but should not be worn swimming for longer than 30 minutes at a depth of 8 feet. Hot baths should be avoided for longer than 10 minutes.     • Avoid wearing or being around magnets while wearing the iPro  (examples: cell phone thomas, bracelet, belt, magnetic mattress pad).     • Do not administer insulin within 3 inches of the glucose sensor site.     • If tape peels back/becomes loose-do not remove or you could pull the sensor out! Place another piece of tape or band aid on top to secure.     • If the iPro accidentally falls off do not take apart any of the pieces…..keep the tape, sensor and recorder attached-in one piece-as it came off your body. Do not try to clean the iPro. Place this in a ziplock bag and bring back to the clinic with your logbook and glucose meter if one was provided to you  Please come back to your doctor’s office and have the iPro,  removed by the office staff.  Do not remove yourself.           Test your blood sugar levels 4 times per day:    • Before Breakfast  • Before Lunch  • Before Dinner  • Before Bed      Be sure to fill out your log sheets.     Test your blood sugar 15 minutes before your iPro being removed.     If you have any questions or concerns while wearing the device please call the office. [phone]

## 2018-01-02 NOTE — PROGRESS NOTES
Ipro report November 10th through November 13th 2017.    Time in range. 61% above 154 dekaliter, 30% in target range. 9% below 70 milligrams per deciliter.    Average glucose 171 milligrams per deciliter.    Estimated A1c 7.6%    Patterns.  Variable glucose with low glucose overnight 11 p.m. through 6 a.m. One day being 50 through 80 mg per dl, one day less than 50 mg for dekaliter and 3 days over a 150 mg do.     Hypoglycemia. Pre breakfast 6 a.m. through 10 a.m. Two out of four days excursions observe. One day 50 through 70 milligrams per dekaliter and one day less than 50 mg for dekaliter    Hyperglycemia dinner time 5 p.m. through 8 p.m. 3 out of 4 days excursions observe 2 days being 150 through 250 mg for dekaliter and one day over 250 milligrams per deciliter.    Sensor information 829 readings. Highest reading 330 mg/DL lowest reading 44 dekaliter average 171 mg for dekaliter      Male information.   Before breakfast average 67 mg per dekaliter  After breakfast 213 mg for dekaliter.  Before lunch 213 mg per deciliter,   after lunch 174 dekaliter.  Before dinner 107 mg per deciliter and after dinner 224 for dekaliter    Present therapy as of encountered October 31st 2017 two weeks end of this therapy patient was titrating tresiba  Tresiba ant 20 units in the morning.  Janumet 50/1000 extended-release once-daily  With Victoza    Sulfa antibotic allergy. Will need pre-meal insulin

## 2018-01-30 DIAGNOSIS — E78.5 HYPERLIPIDEMIA, UNSPECIFIED HYPERLIPIDEMIA TYPE: ICD-10-CM

## 2018-01-30 DIAGNOSIS — E55.9 AVITAMINOSIS D: Primary | ICD-10-CM

## 2018-01-30 DIAGNOSIS — IMO0002 UNCONTROLLED TYPE 2 DIABETES MELLITUS WITH COMPLICATION, WITHOUT LONG-TERM CURRENT USE OF INSULIN: ICD-10-CM

## 2018-01-31 ENCOUNTER — LAB (OUTPATIENT)
Dept: ENDOCRINOLOGY | Age: 78
End: 2018-01-31

## 2018-01-31 DIAGNOSIS — E55.9 AVITAMINOSIS D: ICD-10-CM

## 2018-01-31 DIAGNOSIS — E78.5 HYPERLIPIDEMIA, UNSPECIFIED HYPERLIPIDEMIA TYPE: ICD-10-CM

## 2018-01-31 DIAGNOSIS — IMO0002 UNCONTROLLED TYPE 2 DIABETES MELLITUS WITH COMPLICATION, WITHOUT LONG-TERM CURRENT USE OF INSULIN: ICD-10-CM

## 2018-02-01 LAB
25(OH)D3+25(OH)D2 SERPL-MCNC: 36.2 NG/ML (ref 30–100)
ALBUMIN SERPL-MCNC: 4.2 G/DL (ref 3.5–5.2)
ALBUMIN/GLOB SERPL: 1.4 G/DL
ALP SERPL-CCNC: 105 U/L (ref 39–117)
ALT SERPL-CCNC: 18 U/L (ref 1–33)
AST SERPL-CCNC: 17 U/L (ref 1–32)
BILIRUB SERPL-MCNC: 0.4 MG/DL (ref 0.1–1.2)
BUN SERPL-MCNC: 16 MG/DL (ref 8–23)
BUN/CREAT SERPL: 18.6 (ref 7–25)
C PEPTIDE SERPL-MCNC: 1.3 NG/ML (ref 1.1–4.4)
CALCIUM SERPL-MCNC: 9.6 MG/DL (ref 8.6–10.5)
CHLORIDE SERPL-SCNC: 97 MMOL/L (ref 98–107)
CHOLEST SERPL-MCNC: 209 MG/DL (ref 0–200)
CO2 SERPL-SCNC: 29.6 MMOL/L (ref 22–29)
CREAT SERPL-MCNC: 0.86 MG/DL (ref 0.57–1)
GFR SERPLBLD CREATININE-BSD FMLA CKD-EPI: 64 ML/MIN/1.73
GFR SERPLBLD CREATININE-BSD FMLA CKD-EPI: 78 ML/MIN/1.73
GLOBULIN SER CALC-MCNC: 3.1 GM/DL
GLUCOSE SERPL-MCNC: 163 MG/DL (ref 65–99)
HBA1C MFR BLD: 8.62 % (ref 4.8–5.6)
HDLC SERPL-MCNC: 85 MG/DL (ref 40–60)
INTERPRETATION: NORMAL
LDLC SERPL CALC-MCNC: 99 MG/DL (ref 0–100)
Lab: NORMAL
POTASSIUM SERPL-SCNC: 3.6 MMOL/L (ref 3.5–5.2)
PROT SERPL-MCNC: 7.3 G/DL (ref 6–8.5)
SODIUM SERPL-SCNC: 139 MMOL/L (ref 136–145)
TRIGL SERPL-MCNC: 127 MG/DL (ref 0–150)
VLDLC SERPL CALC-MCNC: 25.4 MG/DL (ref 5–40)

## 2018-02-06 ENCOUNTER — OFFICE VISIT (OUTPATIENT)
Dept: ENDOCRINOLOGY | Age: 78
End: 2018-02-06

## 2018-02-06 VITALS
WEIGHT: 145 LBS | SYSTOLIC BLOOD PRESSURE: 134 MMHG | DIASTOLIC BLOOD PRESSURE: 84 MMHG | HEIGHT: 59 IN | BODY MASS INDEX: 29.23 KG/M2

## 2018-02-06 DIAGNOSIS — E78.5 HYPERLIPIDEMIA, UNSPECIFIED HYPERLIPIDEMIA TYPE: ICD-10-CM

## 2018-02-06 DIAGNOSIS — Z79.4 LONG-TERM INSULIN USE (HCC): ICD-10-CM

## 2018-02-06 DIAGNOSIS — IMO0002 UNCONTROLLED TYPE 2 DIABETES MELLITUS WITH COMPLICATION, WITHOUT LONG-TERM CURRENT USE OF INSULIN: Primary | ICD-10-CM

## 2018-02-06 DIAGNOSIS — I10 ESSENTIAL HYPERTENSION: ICD-10-CM

## 2018-02-06 PROCEDURE — 99214 OFFICE O/P EST MOD 30 MIN: CPT | Performed by: NURSE PRACTITIONER

## 2018-02-06 NOTE — PATIENT INSTRUCTIONS
New instructions for basal insulIn  Tresiba U200 24 units in AM   Titration instructions - increase AM dose 1 units every 1 days if fasting blood sugar is over 110mg/dl     Stop the Janumet 50/1000 twice daily    Start Victoza - start .6mg inj daily x 1 week, then 1.2mg inj daily x1 week then maintain at 1.8 mg inj daily    Start metformin XR 750mg twice daily with breakfast and dinner.       home blood tests -  Blood glucose testing: 3 times daily, that are: fasting- 1st thing in morning before eating or drinking, before each meal and 1 or 2 hours after meal  Bedtime, anytime you feel symptoms of hyperglycemia or hypoglycemia (high or low blood sugars)

## 2018-02-06 NOTE — PROGRESS NOTES
Antonietta Gramajo  presents to the office  for the follow-up appointment for Type 2 diabetes mellitus.     The diabete's condition location is throughout the system with the  clinical course has fluctuated, the severity is Mild, and the modifying/allievating factors are insulin.  Medications and  Dosages were  reviewed with Antonietta Gramajo and suggested that compliance most of the time.    Associated symptoms of hyperglycemia have been feels hot and pressure in back of head.  Associated symptoms of hypoglycemia have been jitteriness and wekaness. Patient reports  hypoglycemia threshold for symptoms is 60 mg/dl .     The patient is currently on insulin.    Compliance with blood glucose monitoring: good.     Meal panning: The patient is using avoidance of concentrated sweets.    The patient is currently taking home blood tests - Blood glucose testing: 3-4 times daily, that are:  fasting- 1st thing in morning before eating or drinking  before each meal  anytime you feel symptoms of hyperglycemia or hypoglycemia (high or low blood sugars)   basal insulIn  Tresiba U200 20 units in AM     Last instructions given were:  New instructions for basal insulIn  Tresiba U200 20 units in AM   Titration instructions - increase AM dose 1 units every 1 days if fasting blood sugar is over 110mg/dl      Stop the Jardiance 10 mg in the morning     Stop the Janumet  twice daily      Start Victoza as directed    Home blood glucose testing daily: 3-4  FB to 300 mg/dl  before lunch 185 to 210 mg/dl  before dinner/supper 185 to 210 mg/dl    Last reported blood glucose readings are:  Home blood glucose testing daily: 1  FB to 225 mg/dl   Patient states the 7 day average was 183.    Patterns reported per patient are that her BS has been fluctuating greatly.         The following portions of the patient's history were reviewed and updated as appropriate: current medications, past family history, past medical history, past social  history, past surgical history and problem list.      Current Outpatient Prescriptions:   •  albuterol (PROAIR HFA) 108 (90 BASE) MCG/ACT inhaler, ProAir  (90 Base) MCG/ACT Inhalation Aerosol Solution; Patient Sig: ProAir  (90 Base) MCG/ACT Inhalation Aerosol Solution ; 0; 20-Oct-2014; Active, Disp: , Rfl:   •  amLODIPine (NORVASC) 5 MG tablet, Take 1 tablet by mouth Daily., Disp: 30 tablet, Rfl: 5  •  cyclobenzaprine (FLEXERIL) 10 MG tablet, Take 10 mg by mouth 3 (Three) Times a Day As Needed for Muscle Spasms., Disp: , Rfl:   •  glucose blood test strip, Check blood glucose 3-4 times dialy, Disp: 100 each, Rfl: 4  •  Insulin Degludec 200 UNIT/ML solution pen-injector, Inject 20 Units under the skin Daily. Titrate as instructed with max of 50 units daily, Disp: 5 pen, Rfl: 4  •  Insulin Pen Needle (PEN NEEDLES) 31G X 5 MM misc, 31 g Daily. 1 injections daily, Disp: 50 each, Rfl: 4  •  latanoprost (XALATAN) 0.005 % ophthalmic solution, , Disp: , Rfl:   •  Liraglutide (VICTOZA) 18 MG/3ML solution pen-injector injection, Victoza 1.8mg SC daily - start .6mg SC daily x 1 week, then 1.2mg SC daily x1 week then maintain at 1.8 mg SC daily, Disp: 3 pen, Rfl: 4  •  losartan-hydrochlorothiazide (HYZAAR) 100-25 MG per tablet, Take 1 tablet by mouth Daily., Disp: 30 tablet, Rfl: 5  •  montelukast (SINGULAIR) 10 MG tablet, Take 1 tablet by mouth Every Night., Disp: 30 tablet, Rfl: 5  •  nabumetone (RELAFEN) 750 MG tablet, Take 1 tablet by mouth 2 (Two) Times a Day As Needed for Mild Pain ., Disp: 60 tablet, Rfl: 5  •  Omeprazole ([facility] OMEPRAZOLE) 20 MG tablet delayed-release, Take  by mouth., Disp: , Rfl:   •  ONETOUCH DELICA LANCETS 33G misc, Check blood glucose 3 times daily DX CODE: e11.65, Disp: 100 each, Rfl: 5  •  Triamcinolone Acetonide (NASACORT) 55 MCG/ACT nasal inhaler, 2 sprays into each nostril daily., Disp: , Rfl:     Patient Active Problem List    Diagnosis   • Hyperlipidemia [E78.5]   • Long-term  "insulin use [Z79.4]   • Hypervitaminosis D  [E67.3]   • Uncontrolled type 2 diabetes mellitus with complication, without long-term current use of insulin [E11.8, E11.65]   • Open-angle glaucoma [H40.10X0]     Overview Note:     dgn 2/4/17. Dr Mcdowell.     • Allergic rhinitis [J30.9]   • Acid reflux [K21.9]   • Essential hypertension [I10]   • Osteoarthritis of knee [M17.10]   • Diabetes mellitus type 2, noninsulin dependent [E11.9]   • Avitaminosis D [E55.9]       Review of Systems   A comprehensive review of the 14 systems was negative except of listed below:  Endocrine: hypoglycemia  hyperglycemia     Objective:     Wt Readings from Last 3 Encounters:   02/06/18 65.8 kg (145 lb)   10/31/17 63.5 kg (140 lb)   09/05/17 63 kg (139 lb)     Temp Readings from Last 3 Encounters:   09/05/17 98.4 °F (36.9 °C)   02/17/17 97.4 °F (36.3 °C)   09/16/16 97.6 °F (36.4 °C)     BP Readings from Last 3 Encounters:   02/06/18 134/84   10/31/17 114/68   09/05/17 124/64     Pulse Readings from Last 3 Encounters:   09/05/17 78   02/17/17 77   09/16/16 78        /84  Ht 149.9 cm (59.02\")  Wt 65.8 kg (145 lb)  BMI 29.27 kg/m2    General appearance:  alert, cooperative, delirious, fatigued, nourished\" \"appears stated age and cooperative\" \"NAD   Neck: no carotid bruit, supple, symmetrical, trachea midline and thyroid not enlarged, symmetric, no tenderness/mass/nodules   Thyroid:  no palpable nodule, no enlargement, no tenderness   Lung:  \"clear to auscultation bilaterally\" \"no abnormal breath sounds  \" effort was normal, no labored breath, no use of accessory muscles.   Heart: regular rate and rhythm, S1, S2 normal, no murmur, click, rub or gallop      Abdomen:  normal bowel sounds- 4 quads, soft non-tender and contour - slt rounded      Extremities: extremities normal, atraumatic, no cyanosis or edema extremities normal, atraumatic, no cyanosis or edema\" WNL - gait and station, strength and stability\"       Skin:   Pulses:  warm " and dry, no hyperpigmentation, normal skin coloring, or suspicious lesions   2+ and symmetric   Neuro: Alert and oriented x3. Gait normal. Reflexes and motor strength normal and symmetric. Cranial nerves 2-12 and sensation grossly intact.      Psych: behavior - normal, judgement - normal, mood - normal, affect - normal                 Lab Review  Results for orders placed or performed in visit on 01/31/18   Comprehensive Metabolic Panel   Result Value Ref Range    Glucose 163 (H) 65 - 99 mg/dL    BUN 16 8 - 23 mg/dL    Creatinine 0.86 0.57 - 1.00 mg/dL    eGFR Non African Am 64 >60 mL/min/1.73    eGFR African Am 78 >60 mL/min/1.73    BUN/Creatinine Ratio 18.6 7.0 - 25.0    Sodium 139 136 - 145 mmol/L    Potassium 3.6 3.5 - 5.2 mmol/L    Chloride 97 (L) 98 - 107 mmol/L    Total CO2 29.6 (H) 22.0 - 29.0 mmol/L    Calcium 9.6 8.6 - 10.5 mg/dL    Total Protein 7.3 6.0 - 8.5 g/dL    Albumin 4.20 3.50 - 5.20 g/dL    Globulin 3.1 gm/dL    A/G Ratio 1.4 g/dL    Total Bilirubin 0.4 0.1 - 1.2 mg/dL    Alkaline Phosphatase 105 39 - 117 U/L    AST (SGOT) 17 1 - 32 U/L    ALT (SGPT) 18 1 - 33 U/L   C-Peptide   Result Value Ref Range    C-Peptide 1.3 1.1 - 4.4 ng/mL   Hemoglobin A1c   Result Value Ref Range    Hemoglobin A1C 8.62 (H) 4.80 - 5.60 %   Vitamin D 25 Hydroxy   Result Value Ref Range    25 Hydroxy, Vitamin D 36.2 30.0 - 100.0 ng/mL   Lipid Panel   Result Value Ref Range    Total Cholesterol 209 (H) 0 - 200 mg/dL    Triglycerides 127 0 - 150 mg/dL    HDL Cholesterol 85 (H) 40 - 60 mg/dL    VLDL Cholesterol 25.4 5 - 40 mg/dL    LDL Cholesterol  99 0 - 100 mg/dL   Cardiovascular Risk Assessment   Result Value Ref Range    Interpretation Note    Diabetes Patient Education   Result Value Ref Range    PDF Image Not applicable            Assessment:   Antonietta was seen today for follow-up.    Diagnoses and all orders for this visit:    Uncontrolled type 2 diabetes mellitus with complication, without long-term current use of  insulin  -     metFORMIN ER (GLUCOPHAGE-XR) 750 MG 24 hr tablet; Take 1 tablet by mouth 2 (Two) Times a Day.    Hyperlipidemia, unspecified hyperlipidemia type    Essential hypertension    Long-term insulin use          Plan:   In summary: I met with this patient today is metabolically stable and doing well.  Patient did not start Victoza as previously instructed on last visit due to she was leery of hyperglycemia and miscommunication.  She has continued her prior medication as with Janumet.  Her blood glucose has remained the same labile.  Her blood glucose runs from 65 mg/Sirisha to 300 mg/Sirisha with no eating changes.  Review of labs prior to visit was discussed with patient with additional self minding blood glucose requirements the below medication changes were completed:    New instructions for basal insulIn  Tresiba U200 24 units in AM   Titration instructions - increase AM dose 1 units every 1 days if fasting blood sugar is over 110mg/dl     Stop the Janumet 50/1000 twice daily    Start Victoza - start .6mg inj daily x 1 week, then 1.2mg inj daily x1 week then maintain at 1.8 mg inj daily    Start metformin XR 750mg twice daily with breakfast and dinner.     Additional instructions given  home blood tests -  Blood glucose testing: 3 times daily, that are: fasting- 1st thing in morning before eating or drinking, before each meal and 1 or 2 hours after meal  Bedtime, anytime you feel symptoms of hyperglycemia or hypoglycemia (high or low blood sugars)      Patient was also instructed on the need for and I pro due to her B and leery of hypoglycemia and feeling as if she goes hypoglycemic during the night.  We will order and I pro at this time.      Education:  interpretation of lab results, blood sugar goals, complications of diabetes mellitus, hypoglycemia prevention and treatment, exercise, illness management, self-monitoring of blood glucose skills, nutrition, carbohydrate counting and site rotation        Office  visit 25 minutes with additional education given 14 minutes - SMBG with goals, medication changes with purposes and s/e profiles.       Return in about 3 months (around 5/6/2018), or if symptoms worsen or fail to improve, for Recheck. 3 months with Pauline-1 week prior for labs 6 months with Dr. Holley-one week prior for labs        Dragon transcription disclaimer     Much of this encounter note is an electronic transcription/translation of spoken language to printed text. The electronic translation of spoken language may permit erroneous, or at times, nonsensical words or phrases to be inadvertently transcribed. Although I have reviewed the note for such errors, some may still exist.

## 2018-02-07 RX ORDER — METFORMIN HYDROCHLORIDE 750 MG/1
750 TABLET, EXTENDED RELEASE ORAL
Qty: 60 TABLET | Refills: 4 | Status: SHIPPED | OUTPATIENT
Start: 2018-02-07 | End: 2018-05-24 | Stop reason: SDUPTHER

## 2018-02-17 DIAGNOSIS — IMO0002 UNCONTROLLED TYPE 2 DIABETES MELLITUS WITH COMPLICATION, WITHOUT LONG-TERM CURRENT USE OF INSULIN: ICD-10-CM

## 2018-02-19 RX ORDER — SITAGLIPTIN AND METFORMIN HYDROCHLORIDE 1000; 50 MG/1; MG/1
TABLET, FILM COATED, EXTENDED RELEASE ORAL
Qty: 30 TABLET | Refills: 3 | Status: SHIPPED | OUTPATIENT
Start: 2018-02-19 | End: 2018-03-06

## 2018-02-19 RX ORDER — LOSARTAN POTASSIUM AND HYDROCHLOROTHIAZIDE 25; 100 MG/1; MG/1
TABLET ORAL
Qty: 30 TABLET | Refills: 4 | Status: SHIPPED | OUTPATIENT
Start: 2018-02-19 | End: 2018-03-06 | Stop reason: SDUPTHER

## 2018-03-06 ENCOUNTER — OFFICE VISIT (OUTPATIENT)
Dept: INTERNAL MEDICINE | Facility: CLINIC | Age: 78
End: 2018-03-06

## 2018-03-06 VITALS
SYSTOLIC BLOOD PRESSURE: 140 MMHG | TEMPERATURE: 98.2 F | DIASTOLIC BLOOD PRESSURE: 74 MMHG | BODY MASS INDEX: 29.64 KG/M2 | OXYGEN SATURATION: 97 % | WEIGHT: 147 LBS | HEART RATE: 99 BPM | HEIGHT: 59 IN

## 2018-03-06 DIAGNOSIS — M17.0 OSTEOARTHRITIS OF BOTH KNEES, UNSPECIFIED OSTEOARTHRITIS TYPE: ICD-10-CM

## 2018-03-06 DIAGNOSIS — E78.5 HYPERLIPIDEMIA, UNSPECIFIED HYPERLIPIDEMIA TYPE: ICD-10-CM

## 2018-03-06 DIAGNOSIS — J45.20 MILD INTERMITTENT ASTHMA WITHOUT COMPLICATION: ICD-10-CM

## 2018-03-06 DIAGNOSIS — I10 ESSENTIAL HYPERTENSION: Primary | ICD-10-CM

## 2018-03-06 PROBLEM — E67.3 HYPERVITAMINOSIS D: Status: RESOLVED | Noted: 2017-10-31 | Resolved: 2018-03-06

## 2018-03-06 PROCEDURE — 99214 OFFICE O/P EST MOD 30 MIN: CPT | Performed by: FAMILY MEDICINE

## 2018-03-06 RX ORDER — LOSARTAN POTASSIUM AND HYDROCHLOROTHIAZIDE 25; 100 MG/1; MG/1
1 TABLET ORAL DAILY
Qty: 30 TABLET | Refills: 5 | Status: SHIPPED | OUTPATIENT
Start: 2018-03-06 | End: 2018-09-11 | Stop reason: SDUPTHER

## 2018-03-06 RX ORDER — NABUMETONE 750 MG/1
750 TABLET, FILM COATED ORAL 2 TIMES DAILY PRN
Qty: 60 TABLET | Refills: 5 | Status: SHIPPED | OUTPATIENT
Start: 2018-03-06 | End: 2019-03-05 | Stop reason: SDUPTHER

## 2018-03-06 RX ORDER — MONTELUKAST SODIUM 10 MG/1
10 TABLET ORAL NIGHTLY
Qty: 30 TABLET | Refills: 5 | Status: SHIPPED | OUTPATIENT
Start: 2018-03-06 | End: 2019-03-05

## 2018-03-06 RX ORDER — ALBUTEROL SULFATE 90 UG/1
1 AEROSOL, METERED RESPIRATORY (INHALATION) EVERY 4 HOURS PRN
Qty: 1 INHALER | Refills: 1 | Status: SHIPPED | OUTPATIENT
Start: 2018-03-06 | End: 2018-09-11 | Stop reason: SDUPTHER

## 2018-03-06 RX ORDER — AMLODIPINE BESYLATE 5 MG/1
5 TABLET ORAL DAILY
Qty: 30 TABLET | Refills: 5 | Status: SHIPPED | OUTPATIENT
Start: 2018-03-06 | End: 2018-09-11 | Stop reason: SDUPTHER

## 2018-03-06 NOTE — PROGRESS NOTES
Subjective   Antonietta Gramajo is a 77 y.o. female.   Chief Complaint   Patient presents with   • Hypertension   • Hyperlipidemia   • Asthma   • Osteoarthritis       History of Present Illness     #1 hypertension-on losartan/HCTZ 100/25 mg a day and amlodipine 5 mg -she takes it every other day. She does not tolerate amlodipine 5 mg every day because it makes her very tired. Patient denies chest pain, shortness of breath, dizziness, palpitations. No change with LE edema. Creatinine 0.64, GFR 64.  She walks almost daily for an hour.  Pressure is mildly elevated in the office today, but patient reports that at home it stays in 120s over 70s.      #2 osteoarthritis- shoulders hands and knees. Pain is achy, it comes and goes. It is better with nabumetone. Patient is using it as needed, twice a day. If she forgets to take it she gets pain. It helps her to function. She had seen no blood in stool, no black stools. She reports no abdominal pain.     #3 Asthma-patient is on singular and albuterol as needed.  She takes singular everyday.  She uses albuterol on average once or twice a month.  Her symptoms are controlled.  ACT is at 21.  She had Prevnar 13 in 9/2017.  Pneumovax in 2007.      The following portions of the patient's history were reviewed and updated as appropriate: allergies, current medications, past family history, past medical history, past social history, past surgical history and problem list.    Review of Systems   Constitutional: Negative.    Respiratory: Negative.    Cardiovascular: Negative.    Musculoskeletal: Positive for arthralgias.         Objective   Wt Readings from Last 3 Encounters:   03/06/18 66.7 kg (147 lb)   02/06/18 65.8 kg (145 lb)   10/31/17 63.5 kg (140 lb)      Vitals:    03/06/18 1422   BP: 140/74   Pulse: 99   Temp: 98.2 °F (36.8 °C)   SpO2: 97%     Temp Readings from Last 3 Encounters:   03/06/18 98.2 °F (36.8 °C)   09/05/17 98.4 °F (36.9 °C)   02/17/17 97.4 °F (36.3 °C)     BP  Readings from Last 3 Encounters:   03/06/18 140/74   02/06/18 134/84   10/31/17 114/68     Pulse Readings from Last 3 Encounters:   03/06/18 99   09/05/17 78   02/17/17 77       Physical Exam   Constitutional: She is oriented to person, place, and time. She appears well-developed and well-nourished.   HENT:   Head: Normocephalic and atraumatic.   Neck: Neck supple. Carotid bruit is not present. No thyromegaly present.   Cardiovascular: Normal rate, regular rhythm and normal heart sounds.    Pulmonary/Chest: Effort normal and breath sounds normal.   Neurological: She is alert and oriented to person, place, and time.   Skin: Skin is warm, dry and intact.   Psychiatric: She has a normal mood and affect. Her behavior is normal.       Assessment/Plan   Antonietta was seen today for hypertension, asthma and osteoarthritis.    Diagnoses and all orders for this visit:    Essential hypertension  -     CBC (No Diff); Future    Hyperlipidemia, unspecified hyperlipidemia type    Mild intermittent asthma without complication    Osteoarthritis of both knees, unspecified osteoarthritis type  -     CBC (No Diff); Future    Other orders  -     nabumetone (RELAFEN) 750 MG tablet; Take 1 tablet by mouth 2 (Two) Times a Day As Needed for Mild Pain .  -     montelukast (SINGULAIR) 10 MG tablet; Take 1 tablet by mouth Every Night.  -     losartan-hydrochlorothiazide (HYZAAR) 100-25 MG per tablet; Take 1 tablet by mouth Daily.  -     amLODIPine (NORVASC) 5 MG tablet; Take 1 tablet by mouth Daily.  -     albuterol (PROAIR HFA) 108 (90 Base) MCG/ACT inhaler; Inhale 1 puff Every 4 (Four) Hours As Needed for Wheezing.               #1 hypertension-continue current treatment.  Follow-up in 6 months.      #2 asthma-continue same.  Follow-up in 6 months.  Will need spirometry at next office visit.    #3 osteoarthritis-continue same.  Follow-up in 6 months.  Risks of kidney failure, GI bleeding and cardiovascular risks with nabumetone.

## 2018-04-30 DIAGNOSIS — E55.9 AVITAMINOSIS D: ICD-10-CM

## 2018-04-30 DIAGNOSIS — IMO0002 UNCONTROLLED TYPE 2 DIABETES MELLITUS WITH COMPLICATION, WITHOUT LONG-TERM CURRENT USE OF INSULIN: ICD-10-CM

## 2018-04-30 DIAGNOSIS — E78.5 HYPERLIPIDEMIA, UNSPECIFIED HYPERLIPIDEMIA TYPE: Primary | ICD-10-CM

## 2018-05-06 ENCOUNTER — RESULTS ENCOUNTER (OUTPATIENT)
Dept: INTERNAL MEDICINE | Facility: CLINIC | Age: 78
End: 2018-05-06

## 2018-05-06 DIAGNOSIS — M17.0 OSTEOARTHRITIS OF BOTH KNEES, UNSPECIFIED OSTEOARTHRITIS TYPE: ICD-10-CM

## 2018-05-06 DIAGNOSIS — I10 ESSENTIAL HYPERTENSION: ICD-10-CM

## 2018-05-22 ENCOUNTER — LAB (OUTPATIENT)
Dept: ENDOCRINOLOGY | Age: 78
End: 2018-05-22

## 2018-05-22 DIAGNOSIS — E55.9 AVITAMINOSIS D: ICD-10-CM

## 2018-05-22 DIAGNOSIS — IMO0002 UNCONTROLLED TYPE 2 DIABETES MELLITUS WITH COMPLICATION, WITHOUT LONG-TERM CURRENT USE OF INSULIN: ICD-10-CM

## 2018-05-22 DIAGNOSIS — E78.5 HYPERLIPIDEMIA, UNSPECIFIED HYPERLIPIDEMIA TYPE: ICD-10-CM

## 2018-05-23 LAB
25(OH)D3+25(OH)D2 SERPL-MCNC: 43.1 NG/ML (ref 30–100)
ALBUMIN SERPL-MCNC: 4.3 G/DL (ref 3.5–5.2)
ALBUMIN/GLOB SERPL: 1.4 G/DL
ALP SERPL-CCNC: 121 U/L (ref 39–117)
ALT SERPL-CCNC: 16 U/L (ref 1–33)
AST SERPL-CCNC: 15 U/L (ref 1–32)
BILIRUB SERPL-MCNC: 0.6 MG/DL (ref 0.1–1.2)
BUN SERPL-MCNC: 16 MG/DL (ref 8–23)
BUN/CREAT SERPL: 18.2 (ref 7–25)
C PEPTIDE SERPL-MCNC: 1.6 NG/ML (ref 1.1–4.4)
CALCIUM SERPL-MCNC: 10.2 MG/DL (ref 8.6–10.5)
CHLORIDE SERPL-SCNC: 99 MMOL/L (ref 98–107)
CHOLEST SERPL-MCNC: 213 MG/DL (ref 0–200)
CO2 SERPL-SCNC: 27.4 MMOL/L (ref 22–29)
CREAT SERPL-MCNC: 0.88 MG/DL (ref 0.57–1)
GFR SERPLBLD CREATININE-BSD FMLA CKD-EPI: 62 ML/MIN/1.73
GFR SERPLBLD CREATININE-BSD FMLA CKD-EPI: 76 ML/MIN/1.73
GLOBULIN SER CALC-MCNC: 3.1 GM/DL
GLUCOSE SERPL-MCNC: 244 MG/DL (ref 65–99)
HBA1C MFR BLD: 8.3 % (ref 4.8–5.6)
HDLC SERPL-MCNC: 65 MG/DL (ref 40–60)
INTERPRETATION: NORMAL
LDLC SERPL CALC-MCNC: 103 MG/DL (ref 0–100)
Lab: NORMAL
MICROALBUMIN UR-MCNC: 25 UG/ML
POTASSIUM SERPL-SCNC: 4.6 MMOL/L (ref 3.5–5.2)
PROT SERPL-MCNC: 7.4 G/DL (ref 6–8.5)
SODIUM SERPL-SCNC: 142 MMOL/L (ref 136–145)
TRIGL SERPL-MCNC: 226 MG/DL (ref 0–150)
VLDLC SERPL CALC-MCNC: 45.2 MG/DL (ref 5–40)

## 2018-05-24 DIAGNOSIS — IMO0002 UNCONTROLLED TYPE 2 DIABETES MELLITUS WITH COMPLICATION, WITHOUT LONG-TERM CURRENT USE OF INSULIN: ICD-10-CM

## 2018-05-25 RX ORDER — METFORMIN HYDROCHLORIDE 750 MG/1
TABLET, EXTENDED RELEASE ORAL
Qty: 60 TABLET | Refills: 3 | Status: SHIPPED | OUTPATIENT
Start: 2018-05-25 | End: 2018-05-29

## 2018-05-29 ENCOUNTER — OFFICE VISIT (OUTPATIENT)
Dept: ENDOCRINOLOGY | Age: 78
End: 2018-05-29

## 2018-05-29 VITALS
WEIGHT: 149 LBS | BODY MASS INDEX: 30.04 KG/M2 | HEIGHT: 59 IN | SYSTOLIC BLOOD PRESSURE: 138 MMHG | DIASTOLIC BLOOD PRESSURE: 84 MMHG

## 2018-05-29 DIAGNOSIS — I10 ESSENTIAL HYPERTENSION: ICD-10-CM

## 2018-05-29 DIAGNOSIS — E78.5 HYPERLIPIDEMIA, UNSPECIFIED HYPERLIPIDEMIA TYPE: ICD-10-CM

## 2018-05-29 DIAGNOSIS — Z79.4 LONG-TERM INSULIN USE (HCC): ICD-10-CM

## 2018-05-29 DIAGNOSIS — E67.3 HYPERVITAMINOSIS D: ICD-10-CM

## 2018-05-29 DIAGNOSIS — IMO0002 UNCONTROLLED TYPE 2 DIABETES MELLITUS WITH COMPLICATION, WITHOUT LONG-TERM CURRENT USE OF INSULIN: Primary | ICD-10-CM

## 2018-05-29 PROCEDURE — 99214 OFFICE O/P EST MOD 30 MIN: CPT | Performed by: NURSE PRACTITIONER

## 2018-05-29 RX ORDER — CHLORAL HYDRATE 500 MG
2000 CAPSULE ORAL 2 TIMES DAILY WITH MEALS
Qty: 240 CAPSULE | Refills: 4 | Status: SHIPPED | OUTPATIENT
Start: 2018-05-29 | End: 2021-01-14

## 2018-05-29 NOTE — PROGRESS NOTES
Antonietta Gramajo  presents to the office  for the follow-up appointment for Type 2 diabetes mellitus.     The diabete's condition location is throughout the system with the  clinical course has fluctuated, the severity is Mild, and the modifying/allievating factors are insulin.  Medications and  Dosages were  reviewed with Antonietta Gramajo and suggested that compliance most of the time.    The patient reports associated symptoms of hyperglycemia have been feel hot with pressure in the back of the head and associated symptoms of hypoglycemia have been jitteriness and weakness, with their  hypoglycemia threshold for symptoms is 60 mg/dl .     The patient is currently on insulin.       Compliance with blood glucose monitoring: good.     Meal panning: The patient is using avoidance of concentrated sweets.    The patient is currently taking home blood tests - Blood glucose testin-1 times daily, that are:  fasting- 1st thing in morning before eating or drinking   basal insulIn  Tresiba U200 24 units in AM     Last instructions given were:  New instructions for basal insulIn  Tresiba U200 24 units in AM   Titration instructions - increase AM dose 1 units every 1 days if fasting blood sugar is over 110mg/dl      Stop the Janumet  twice daily     Start Victoza - start .6mg inj daily x 1 week, then 1.2mg inj daily x1 week then maintain at 1.8 mg inj daily    Home blood glucose testing daily: 0-1  FB to 140 mg/dl-  couple of mornings was 65 mg/dl and 4 morning in a roll 69-71 mg/dl.   Patient reports if she eats CHO/protein - then awaken normally 110 mg/dl, if she doesn't eat- awakes 65-71 mg/dl      Last reported blood glucose readings are:  Home blood glucose testing daily: 3-4  FB to 300 mg/dl  before lunch 185 to 210 mg/dl  before dinner/supper 185 to 210 mg/dl    Patterns reported per patient are that she has had some low BS in the AM.          The following portions of the patient's history were  reviewed and updated as appropriate: current medications, past family history, past medical history, past social history, past surgical history and problem list.      Current Outpatient Prescriptions:   •  albuterol (PROAIR HFA) 108 (90 Base) MCG/ACT inhaler, Inhale 1 puff Every 4 (Four) Hours As Needed for Wheezing., Disp: 1 inhaler, Rfl: 1  •  amLODIPine (NORVASC) 5 MG tablet, Take 1 tablet by mouth Daily., Disp: 30 tablet, Rfl: 5  •  cyclobenzaprine (FLEXERIL) 10 MG tablet, Take 10 mg by mouth 3 (Three) Times a Day As Needed for Muscle Spasms., Disp: , Rfl:   •  glucose blood test strip, Check blood glucose 3-4 times dialy, Disp: 100 each, Rfl: 4  •  Insulin Degludec 200 UNIT/ML solution pen-injector, Inject 20 Units under the skin Daily. Titrate as instructed with max of 50 units daily, Disp: 5 pen, Rfl: 4  •  Insulin Pen Needle (PEN NEEDLES) 31G X 5 MM misc, 31 g Daily. 1 injections daily, Disp: 50 each, Rfl: 4  •  latanoprost (XALATAN) 0.005 % ophthalmic solution, , Disp: , Rfl:   •  losartan-hydrochlorothiazide (HYZAAR) 100-25 MG per tablet, Take 1 tablet by mouth Daily., Disp: 30 tablet, Rfl: 5  •  montelukast (SINGULAIR) 10 MG tablet, Take 1 tablet by mouth Every Night., Disp: 30 tablet, Rfl: 5  •  nabumetone (RELAFEN) 750 MG tablet, Take 1 tablet by mouth 2 (Two) Times a Day As Needed for Mild Pain ., Disp: 60 tablet, Rfl: 5  •  Omeprazole ([facility] OMEPRAZOLE) 20 MG tablet delayed-release, Take  by mouth., Disp: , Rfl:   •  ONETOUCH DELICA LANCETS 33G misc, Check blood glucose 3 times daily DX CODE: e11.65, Disp: 100 each, Rfl: 5  •  Triamcinolone Acetonide (NASACORT) 55 MCG/ACT nasal inhaler, 2 sprays into each nostril daily., Disp: , Rfl:   •  Omega-3 Fatty Acids (FISH OIL) 1000 MG capsule capsule, Take 2 capsules by mouth 2 (Two) Times a Day With Meals., Disp: 240 capsule, Rfl: 4  •  sitaGLIPtin-metFORMIN (JANUMET)  MG per tablet, Take 1 tablet by mouth 2 (Two) Times a Day With Meals., Disp: 60  "tablet, Rfl: 3    Patient Active Problem List    Diagnosis   • Mild intermittent asthma without complication [J45.20]   • Hyperlipidemia [E78.5]   • Long-term insulin use [Z79.4]   • Uncontrolled type 2 diabetes mellitus with complication, without long-term current use of insulin [E11.8, E11.65]   • Open-angle glaucoma [H40.10X0]     Overview Note:     dgn 2/4/17. Dr Mcdowell.     • Allergic rhinitis [J30.9]   • Acid reflux [K21.9]   • Essential hypertension [I10]   • Osteoarthritis of knee [M17.10]   • Diabetes mellitus type 2, noninsulin dependent [E11.9]   • Avitaminosis D [E55.9]       Review of Systems   A comprehensive review of the 14 systems was negative except of listed below:  Endocrine: hypoglycemia  hyperglycemia     Objective:     Wt Readings from Last 3 Encounters:   05/29/18 67.6 kg (149 lb)   03/06/18 66.7 kg (147 lb)   02/06/18 65.8 kg (145 lb)     Temp Readings from Last 3 Encounters:   03/06/18 98.2 °F (36.8 °C)   09/05/17 98.4 °F (36.9 °C)   02/17/17 97.4 °F (36.3 °C)     BP Readings from Last 3 Encounters:   05/29/18 138/84   03/06/18 140/74   02/06/18 134/84     Pulse Readings from Last 3 Encounters:   03/06/18 99   09/05/17 78   02/17/17 77        /84   Ht 149.9 cm (59.02\")   Wt 67.6 kg (149 lb)   BMI 30.08 kg/m²     General appearance:  alert, cooperative, delirious, fatigued, nourished\" \"appears stated age and cooperative\" \"NAD   Neck: no carotid bruit, supple, symmetrical, trachea midline and thyroid not enlarged, symmetric, no tenderness/mass/nodules   Thyroid:  no palpable nodule, no enlargement, no tenderness   Lung:  \"clear to auscultation bilaterally\" \"no abnormal breath sounds  \" effort was normal, no labored breath, no use of accessory muscles.   Heart: regular rate and rhythm, S1, S2 normal, no murmur, click, rub or gallop      Abdomen:  normal bowel sounds- 4 quads, soft non-tender and contour - slt rounded      Extremities: extremities normal, atraumatic, no cyanosis or " "edema extremities normal, atraumatic, no cyanosis or edema\" WNL - gait and station, strength and stability\"       Skin:   Pulses:  warm and dry, no hyperpigmentation, normal skin coloring, or suspicious lesions   2+ and symmetric   Neuro: Alert and oriented x3. Gait normal. Reflexes and motor strength normal and symmetric. Cranial nerves 2-12 and sensation grossly intact.      Psych: behavior - normal, judgement - normal, mood - normal, affect - normal                 Lab Review  Results for orders placed or performed in visit on 05/22/18   Comprehensive Metabolic Panel   Result Value Ref Range    Glucose 244 (H) 65 - 99 mg/dL    BUN 16 8 - 23 mg/dL    Creatinine 0.88 0.57 - 1.00 mg/dL    eGFR Non African Am 62 >60 mL/min/1.73    eGFR African Am 76 >60 mL/min/1.73    BUN/Creatinine Ratio 18.2 7.0 - 25.0    Sodium 142 136 - 145 mmol/L    Potassium 4.6 3.5 - 5.2 mmol/L    Chloride 99 98 - 107 mmol/L    Total CO2 27.4 22.0 - 29.0 mmol/L    Calcium 10.2 8.6 - 10.5 mg/dL    Total Protein 7.4 6.0 - 8.5 g/dL    Albumin 4.30 3.50 - 5.20 g/dL    Globulin 3.1 gm/dL    A/G Ratio 1.4 g/dL    Total Bilirubin 0.6 0.1 - 1.2 mg/dL    Alkaline Phosphatase 121 (H) 39 - 117 U/L    AST (SGOT) 15 1 - 32 U/L    ALT (SGPT) 16 1 - 33 U/L   C-Peptide   Result Value Ref Range    C-Peptide 1.6 1.1 - 4.4 ng/mL   Hemoglobin A1c   Result Value Ref Range    Hemoglobin A1C 8.30 (H) 4.80 - 5.60 %   MicroAlbumin, Urine, Random - Urine, Clean Catch   Result Value Ref Range    Microalbumin, Urine 25.0 Not Estab. ug/mL   Vitamin D 25 Hydroxy   Result Value Ref Range    25 Hydroxy, Vitamin D 43.1 30.0 - 100.0 ng/ml   Lipid Panel   Result Value Ref Range    Total Cholesterol 213 (H) 0 - 200 mg/dL    Triglycerides 226 (H) 0 - 150 mg/dL    HDL Cholesterol 65 (H) 40 - 60 mg/dL    VLDL Cholesterol 45.2 (H) 5 - 40 mg/dL    LDL Cholesterol  103 (H) 0 - 100 mg/dL   Cardiovascular Risk Assessment   Result Value Ref Range    Interpretation Note    Diabetes " Patient Education   Result Value Ref Range    PDF Image Not applicable            Assessment:   Antonietta was seen today for follow-up.    Diagnoses and all orders for this visit:    Uncontrolled type 2 diabetes mellitus with complication, without long-term current use of insulin  -     Insulin Degludec 200 UNIT/ML solution pen-injector; Inject 20 Units under the skin Daily. Titrate as instructed with max of 50 units daily  -     Discontinue: SITagliptin (JANUVIA) 100 MG tablet; Take 1 tablet by mouth Daily.  -     sitaGLIPtin-metFORMIN (JANUMET)  MG per tablet; Take 1 tablet by mouth 2 (Two) Times a Day With Meals.  -     Fructosamine; Future  -     Comprehensive Metabolic Panel; Future  -     C-Peptide; Future  -     Hemoglobin A1c; Future  -     Insulin, Total; Future  -     Lipid Panel; Future  -     Microalbumin / Creatinine Urine Ratio - Urine, Clean Catch; Future  -     Uric Acid; Future  -     Vitamin D 25 Hydroxy; Future  -     TSH; Future  -     Thyroid Antibodies; Future  -     T4, Free; Future  -     T4; Future  -     T3, Free; Future  -     T3; Future    Hyperlipidemia, unspecified hyperlipidemia type  -     Omega-3 Fatty Acids (FISH OIL) 1000 MG capsule capsule; Take 2 capsules by mouth 2 (Two) Times a Day With Meals.  -     Comprehensive Metabolic Panel; Future  -     Lipid Panel; Future    Essential hypertension  -     Comprehensive Metabolic Panel; Future    Long-term insulin use  -     Comprehensive Metabolic Panel; Future    Hypervitaminosis D   -     Comprehensive Metabolic Panel; Future  -     Vitamin D 25 Hydroxy; Future          Plan:   In summary:  Met with patient today who is metabolically stable and doing well.  Patient states that she has been experiencing some hypoglycemia random patterns in fasting.  She also reports that she's been having to eat a high complex carbohydrate and protein prior to going to bed to keep from going hypoglycemic in the morning.  Patient has several  allergies and unable to take certain medications.  At this time we will start her back on her Janumet 50/1000 stop her extended release metformin and continue her to see above.  Patient is continue self-monitoring blood glucose as previously prescribed and keep follow-up appointment with labs 2 weeks prior.  Explained all instructions to patient.          Medication changes:     New instructions for basal insulIn  Tresiba U200 20 units in AM   Titration instructions - increase AM dose 1 units every 1 days if fasting blood sugar is over 110mg/dl     restart Janumet 50/1000 twice daily    Stop metformin XR 750mg twice daily with breakfast and dinner.       additional instructions  home blood tests -  Blood glucose testing: 3 times daily, that are: fasting- 1st thing in morning before eating or drinking, before each meal and 1 or 2 hours after meal  Bedtime, anytime you feel symptoms of hyperglycemia or hypoglycemia (high or low blood sugars)        Education:  interpretation of lab results, blood sugar goals, complications of diabetes mellitus, hypoglycemia prevention and treatment, exercise, illness management, self-monitoring of blood glucose skills, nutrition, carbohydrate counting and site rotation        The total face to face time spent was  25 minutes  with additional education given: 14 minutes (greater than 50% of the total time) was spent with counseling and coordination of care on: SMBG with goals, Side effects profiles with medications, medication use and purposes, and spoke with patient concerning Afreeza    Return in about 3 months (around 8/29/2018), or if symptoms worsen or fail to improve, for Recheck. 3 month With Pauline-2 weeks prior for labs 6 months with Dr. Holley-2 weeks prior for labs      Dragon transcription disclaimer     Much of this encounter note is an electronic transcription/translation of spoken language to printed text. The electronic translation of spoken language may permit erroneous,  or at times, nonsensical words or phrases to be inadvertently transcribed. Although I have reviewed the note for such errors, some may still exist.

## 2018-05-29 NOTE — PATIENT INSTRUCTIONS
New instructions for basal insulIn  Tresiba U200 20 units in AM   Titration instructions - increase AM dose 1 units every 1 days if fasting blood sugar is over 110mg/dl     restart Janumet 50/1000 twice daily    Stop metformin XR 750mg twice daily with breakfast and dinner.       home blood tests -  Blood glucose testing: 3 times daily, that are: fasting- 1st thing in morning before eating or drinking, before each meal and 1 or 2 hours after meal  Bedtime, anytime you feel symptoms of hyperglycemia or hypoglycemia (high or low blood sugars)

## 2018-08-30 ENCOUNTER — RESULTS ENCOUNTER (OUTPATIENT)
Dept: ENDOCRINOLOGY | Age: 78
End: 2018-08-30

## 2018-08-30 DIAGNOSIS — E78.5 HYPERLIPIDEMIA, UNSPECIFIED HYPERLIPIDEMIA TYPE: ICD-10-CM

## 2018-08-30 DIAGNOSIS — E67.3 HYPERVITAMINOSIS D: ICD-10-CM

## 2018-08-30 DIAGNOSIS — IMO0002 UNCONTROLLED TYPE 2 DIABETES MELLITUS WITH COMPLICATION, WITHOUT LONG-TERM CURRENT USE OF INSULIN: ICD-10-CM

## 2018-08-30 DIAGNOSIS — I10 ESSENTIAL HYPERTENSION: ICD-10-CM

## 2018-08-30 DIAGNOSIS — Z79.4 LONG-TERM INSULIN USE (HCC): ICD-10-CM

## 2018-09-04 ENCOUNTER — LAB (OUTPATIENT)
Dept: ENDOCRINOLOGY | Age: 78
End: 2018-09-04

## 2018-09-04 DIAGNOSIS — IMO0002 UNCONTROLLED TYPE 2 DIABETES MELLITUS WITH COMPLICATION, WITHOUT LONG-TERM CURRENT USE OF INSULIN: ICD-10-CM

## 2018-09-05 LAB
25(OH)D3+25(OH)D2 SERPL-MCNC: 37.1 NG/ML (ref 30–100)
ALBUMIN SERPL-MCNC: 4.8 G/DL (ref 3.5–5.2)
ALBUMIN/CREAT UR: 12.2 MG/G CREAT (ref 0–30)
ALBUMIN/GLOB SERPL: 1.7 G/DL
ALP SERPL-CCNC: 118 U/L (ref 39–117)
ALT SERPL-CCNC: 20 U/L (ref 1–33)
AST SERPL-CCNC: 16 U/L (ref 1–32)
BILIRUB SERPL-MCNC: 0.4 MG/DL (ref 0.1–1.2)
BUN SERPL-MCNC: 17 MG/DL (ref 8–23)
BUN/CREAT SERPL: 18.9 (ref 7–25)
C PEPTIDE SERPL-MCNC: 2.3 NG/ML (ref 1.1–4.4)
CALCIUM SERPL-MCNC: 10 MG/DL (ref 8.6–10.5)
CHLORIDE SERPL-SCNC: 96 MMOL/L (ref 98–107)
CHOLEST SERPL-MCNC: 202 MG/DL (ref 0–200)
CO2 SERPL-SCNC: 27.6 MMOL/L (ref 22–29)
CREAT SERPL-MCNC: 0.9 MG/DL (ref 0.57–1)
CREAT UR-MCNC: 58 MG/DL
FRUCTOSAMINE SERPL-SCNC: 431 UMOL/L (ref 0–285)
GLOBULIN SER CALC-MCNC: 2.9 GM/DL
GLUCOSE SERPL-MCNC: 200 MG/DL (ref 65–99)
HBA1C MFR BLD: 9.1 % (ref 4.8–5.6)
HDLC SERPL-MCNC: 73 MG/DL (ref 40–60)
INSULIN SERPL-ACNC: 7.9 UIU/ML (ref 2.6–24.9)
INTERPRETATION: NORMAL
LDLC SERPL CALC-MCNC: 94 MG/DL (ref 0–100)
Lab: NORMAL
MICROALBUMIN UR-MCNC: 7.1 UG/ML
POTASSIUM SERPL-SCNC: 3.7 MMOL/L (ref 3.5–5.2)
PROT SERPL-MCNC: 7.7 G/DL (ref 6–8.5)
SODIUM SERPL-SCNC: 140 MMOL/L (ref 136–145)
T3 SERPL-MCNC: 84.1 NG/DL (ref 80–200)
T3FREE SERPL-MCNC: 2 PG/ML (ref 2–4.4)
T4 FREE SERPL-MCNC: 1.73 NG/DL (ref 0.93–1.7)
T4 SERPL-MCNC: 10.37 MCG/DL (ref 4.5–11.7)
THYROGLOB AB SERPL-ACNC: <1 IU/ML (ref 0–0.9)
THYROPEROXIDASE AB SERPL-ACNC: 8 IU/ML (ref 0–34)
TRIGL SERPL-MCNC: 174 MG/DL (ref 0–150)
TSH SERPL DL<=0.005 MIU/L-ACNC: 1.43 MIU/ML (ref 0.27–4.2)
URATE SERPL-MCNC: 5.2 MG/DL (ref 2.4–5.7)
VLDLC SERPL CALC-MCNC: 34.8 MG/DL (ref 5–40)

## 2018-09-11 ENCOUNTER — OFFICE VISIT (OUTPATIENT)
Dept: INTERNAL MEDICINE | Facility: CLINIC | Age: 78
End: 2018-09-11

## 2018-09-11 VITALS
DIASTOLIC BLOOD PRESSURE: 82 MMHG | OXYGEN SATURATION: 98 % | HEIGHT: 59 IN | SYSTOLIC BLOOD PRESSURE: 140 MMHG | BODY MASS INDEX: 29.64 KG/M2 | TEMPERATURE: 98 F | WEIGHT: 147 LBS | HEART RATE: 99 BPM

## 2018-09-11 DIAGNOSIS — M15.9 OSTEOARTHRITIS OF MULTIPLE JOINTS, UNSPECIFIED OSTEOARTHRITIS TYPE: ICD-10-CM

## 2018-09-11 DIAGNOSIS — J45.909 ASTHMA, UNSPECIFIED ASTHMA SEVERITY, UNSPECIFIED WHETHER COMPLICATED, UNSPECIFIED WHETHER PERSISTENT: Primary | ICD-10-CM

## 2018-09-11 DIAGNOSIS — I10 ESSENTIAL HYPERTENSION: ICD-10-CM

## 2018-09-11 PROCEDURE — 99214 OFFICE O/P EST MOD 30 MIN: CPT | Performed by: FAMILY MEDICINE

## 2018-09-11 PROCEDURE — 94010 BREATHING CAPACITY TEST: CPT | Performed by: FAMILY MEDICINE

## 2018-09-11 RX ORDER — LOSARTAN POTASSIUM AND HYDROCHLOROTHIAZIDE 25; 100 MG/1; MG/1
1 TABLET ORAL DAILY
Qty: 30 TABLET | Refills: 5 | Status: SHIPPED | OUTPATIENT
Start: 2018-09-11 | End: 2019-03-05 | Stop reason: SDUPTHER

## 2018-09-11 RX ORDER — ALBUTEROL SULFATE 90 UG/1
1 AEROSOL, METERED RESPIRATORY (INHALATION) EVERY 4 HOURS PRN
Qty: 1 INHALER | Refills: 1 | Status: SHIPPED | OUTPATIENT
Start: 2018-09-11 | End: 2021-12-17 | Stop reason: SDUPTHER

## 2018-09-11 RX ORDER — AMLODIPINE BESYLATE 5 MG/1
5 TABLET ORAL DAILY
Qty: 30 TABLET | Refills: 5 | Status: SHIPPED | OUTPATIENT
Start: 2018-09-11 | End: 2019-03-05 | Stop reason: SDUPTHER

## 2018-09-11 RX ORDER — METFORMIN HYDROCHLORIDE 750 MG/1
TABLET, EXTENDED RELEASE ORAL
COMMUNITY
Start: 2018-07-27 | End: 2018-09-11

## 2018-09-11 NOTE — PROGRESS NOTES
Subjective   Antonietta Gramajo is a 78 y.o. female.   Chief Complaint   Patient presents with   • Hypertension   • Asthma   • Osteoarthritis       History of Present Illness     #1 hypertension-on losartan/HCTZ 100/25 mg a day and amlodipine 5 mg -she takes it every other day. She does not tolerate amlodipine 5 mg every day because it makes her very tired. Patient denies chest pain, shortness of breath, dizziness, palpitations. No change with LE edema. Normal kidney tests and electrolytes.  She does not exercise regularly.         #2 osteoarthritis- shoulders hands and knees. Pain is achy, it comes and goes. It is better with nabumetone. Patient is using it as needed, usually twice a day. If she forgets to take it she gets pain. It helps her to function. She had seen no blood in stool, no black stools. She reports no abdominal pain.      #3 Asthma-patient is off singular.  He stopped it because she read that it can cause legs pain.  She stopped it about a week ago and is not sure if it's helping or not.  She prefers to stay off for a little bit longer. Albuterol as needed.    She uses albuterol on average once a week.  Her symptoms are controlled.  ACT is at 20.  She had Prevnar 13 in 9/2017.  Pneumovax in 2007.    The following portions of the patient's history were reviewed and updated as appropriate: allergies, current medications, past family history, past medical history, past social history, past surgical history and problem list.    Review of Systems   Constitutional: Negative.    Respiratory: Negative.    Cardiovascular: Negative.    Musculoskeletal: Positive for arthralgias and myalgias.   Psychiatric/Behavioral: Negative.          Objective   Wt Readings from Last 3 Encounters:   09/11/18 66.7 kg (147 lb)   05/29/18 67.6 kg (149 lb)   03/06/18 66.7 kg (147 lb)      Vitals:    09/11/18 1123   BP: 140/82   Pulse: 99   Temp: 98 °F (36.7 °C)   SpO2: 98%     Temp Readings from Last 3 Encounters:   09/11/18 98 °F  (36.7 °C)   03/06/18 98.2 °F (36.8 °C)   09/05/17 98.4 °F (36.9 °C)     BP Readings from Last 3 Encounters:   09/11/18 140/82   05/29/18 138/84   03/06/18 140/74     Pulse Readings from Last 3 Encounters:   09/11/18 99   03/06/18 99   09/05/17 78       Physical Exam   Constitutional: She is oriented to person, place, and time. She appears well-developed and well-nourished.   HENT:   Head: Normocephalic and atraumatic.   Neck: Neck supple. Carotid bruit is not present. No thyromegaly present.   Cardiovascular: Normal rate, regular rhythm and normal heart sounds.    Pulmonary/Chest: Effort normal and breath sounds normal.   Neurological: She is alert and oriented to person, place, and time.   Skin: Skin is warm, dry and intact.   Psychiatric: She has a normal mood and affect. Her behavior is normal.       Assessment/Plan   Antonietta was seen today for hypertension, asthma and osteoarthritis.    Diagnoses and all orders for this visit:    Asthma, unspecified asthma severity, unspecified whether complicated, unspecified whether persistent  -     Breathing Capacity Test    Essential hypertension    Osteoarthritis of multiple joints, unspecified osteoarthritis type    Other orders  -     albuterol (PROAIR HFA) 108 (90 Base) MCG/ACT inhaler; Inhale 1 puff Every 4 (Four) Hours As Needed for Wheezing.  -     losartan-hydrochlorothiazide (HYZAAR) 100-25 MG per tablet; Take 1 tablet by mouth Daily.  -     amLODIPine (NORVASC) 5 MG tablet; Take 1 tablet by mouth Daily.        #1 HTN-patient will monitor blood pressure at home and if it stays at or above 140/90 she will see me.  Otherwise in 6 months.  Continue current treatment.    #2 asthma-spirometry in the office is normal.  Report and interpretation will be scanned.  Will continue current treatment.  Follow-up in 6 months.      #3 osteoarthritis-continue current treatment.  Follow-up in 6 months, or sooner if problems.

## 2018-09-18 ENCOUNTER — OFFICE VISIT (OUTPATIENT)
Dept: ENDOCRINOLOGY | Age: 78
End: 2018-09-18

## 2018-09-18 VITALS
BODY MASS INDEX: 30.04 KG/M2 | WEIGHT: 149 LBS | SYSTOLIC BLOOD PRESSURE: 124 MMHG | DIASTOLIC BLOOD PRESSURE: 78 MMHG | HEIGHT: 59 IN

## 2018-09-18 DIAGNOSIS — E78.5 HYPERLIPIDEMIA, UNSPECIFIED HYPERLIPIDEMIA TYPE: ICD-10-CM

## 2018-09-18 DIAGNOSIS — I10 ESSENTIAL HYPERTENSION: ICD-10-CM

## 2018-09-18 DIAGNOSIS — E55.9 VITAMIN D DEFICIENCY: ICD-10-CM

## 2018-09-18 DIAGNOSIS — Z79.4 LONG-TERM INSULIN USE (HCC): ICD-10-CM

## 2018-09-18 DIAGNOSIS — IMO0002 UNCONTROLLED TYPE 2 DIABETES MELLITUS WITH COMPLICATION, WITHOUT LONG-TERM CURRENT USE OF INSULIN: Primary | ICD-10-CM

## 2018-09-18 DIAGNOSIS — E11.43 DIABETIC AUTONOMIC NEUROPATHY ASSOCIATED WITH TYPE 2 DIABETES MELLITUS (HCC): ICD-10-CM

## 2018-09-18 PROCEDURE — 99214 OFFICE O/P EST MOD 30 MIN: CPT | Performed by: NURSE PRACTITIONER

## 2018-09-18 RX ORDER — GABAPENTIN 300 MG/1
300 CAPSULE ORAL DAILY
Qty: 30 CAPSULE | Refills: 1 | Status: SHIPPED | OUTPATIENT
Start: 2018-09-18 | End: 2019-09-18

## 2018-09-18 NOTE — PATIENT INSTRUCTIONS
New instructions for basal insulIn  Tresiba U200 22 units in AM   Titration instructions - increase AM dose 1 units every 1 days if fasting blood sugar is over 110mg/dl     restart Janumet 50/1000 twice daily    Start Neurontin 300mg at bedtime      home blood tests -  Blood glucose testing: 3 times daily, that are: fasting- 1st thing in morning before eating or drinking, before each meal and 1 or 2 hours after meal  Bedtime, anytime you feel symptoms of hyperglycemia or hypoglycemia (high or low blood sugars)

## 2018-09-18 NOTE — PROGRESS NOTES
Antonietta Gramajo  presents to the office  for the follow-up appointment for Type 2 diabetes mellitus.     The diabete's condition location is throughout the system with the  clinical course has fluctuated, the severity is Mild, and the modifying/allievating factors are insulin.  Medications and  Dosages were  reviewed with Antonietta Gramajo and suggested that compliance most of the time.    The patient reports associated symptoms of hyperglycemia have been hot and pressure in head and associated symptoms of hypoglycemia have been jitteriness and weakness, with their  hypoglycemia threshold for symptoms is 60 mg/dl .     The patient is currently on insulin.    Compliance with blood glucose monitoring: good.     Meal panning: The patient is using avoidance of concentrated sweets.    The patient is currently taking home blood tests - Blood glucose testin times daily, that are:  fasting- 1st thing in morning before eating or drinking   basal insulIn  Tresiba U200 20-22 units in AM     Last instructions given were:  New instructions for basal insulIn  Tresiba U200 20 units in AM   Titration instructions - increase AM dose 1 units every 1 days if fasting blood sugar is over 110mg/dl      restart Janumet  twice daily     Stop metformin XR 750mg twice daily with breakfast and dinner.     Home blood glucose testing daily: 1 - FB to 285 mg/dl    Last reported blood glucose readings are: Home blood glucose testing daily: 0-1- FB to 140 mg/dl-  couple of mornings was 65 mg/dl and 4 morning in a roll 69-71 mg/dl.   Patient reports if she eats CHO/protein - then awaken normally 110 mg/dl, if she doesn't eat- awakes 65-71 mg/dl    Patterns reported per patient are none.         The following portions of the patient's history were reviewed and updated as appropriate: current medications, past family history, past medical history, past social history, past surgical history and problem list.      Current Outpatient  Prescriptions:   •  albuterol (PROAIR HFA) 108 (90 Base) MCG/ACT inhaler, Inhale 1 puff Every 4 (Four) Hours As Needed for Wheezing., Disp: 1 inhaler, Rfl: 1  •  amLODIPine (NORVASC) 5 MG tablet, Take 1 tablet by mouth Daily., Disp: 30 tablet, Rfl: 5  •  cyclobenzaprine (FLEXERIL) 10 MG tablet, Take 10 mg by mouth 3 (Three) Times a Day As Needed for Muscle Spasms., Disp: , Rfl:   •  glucose blood test strip, Check blood glucose 3-4 times dialy, Disp: 100 each, Rfl: 4  •  Insulin Degludec 200 UNIT/ML solution pen-injector, Inject 20 Units under the skin into the appropriate area as directed Daily. Titrate as instructed with max of 50 units daily, Disp: 5 pen, Rfl: 4  •  Insulin Pen Needle (PEN NEEDLES) 31G X 5 MM misc, 31 g Daily. 1 injections daily, Disp: 50 each, Rfl: 4  •  latanoprost (XALATAN) 0.005 % ophthalmic solution, , Disp: , Rfl:   •  losartan-hydrochlorothiazide (HYZAAR) 100-25 MG per tablet, Take 1 tablet by mouth Daily., Disp: 30 tablet, Rfl: 5  •  montelukast (SINGULAIR) 10 MG tablet, Take 1 tablet by mouth Every Night., Disp: 30 tablet, Rfl: 5  •  nabumetone (RELAFEN) 750 MG tablet, Take 1 tablet by mouth 2 (Two) Times a Day As Needed for Mild Pain ., Disp: 60 tablet, Rfl: 5  •  Omega-3 Fatty Acids (FISH OIL) 1000 MG capsule capsule, Take 2 capsules by mouth 2 (Two) Times a Day With Meals., Disp: 240 capsule, Rfl: 4  •  Omeprazole ([facility] OMEPRAZOLE) 20 MG tablet delayed-release, Take  by mouth., Disp: , Rfl:   •  ONETOUCH DELICA LANCETS 33G misc, Check blood glucose 3 times daily DX CODE: e11.65, Disp: 100 each, Rfl: 5  •  sitaGLIPtin-metFORMIN (JANUMET)  MG per tablet, Take 1 tablet by mouth 2 (Two) Times a Day With Meals., Disp: 60 tablet, Rfl: 3  •  Triamcinolone Acetonide (NASACORT) 55 MCG/ACT nasal inhaler, 2 sprays into each nostril daily., Disp: , Rfl:   •  gabapentin (NEURONTIN) 300 MG capsule, Take 1 capsule by mouth Daily., Disp: 30 capsule, Rfl: 1    Patient Active Problem List     "Diagnosis   • Vitamin D deficiency [E55.9]   • Diabetic autonomic neuropathy associated with type 2 diabetes mellitus (CMS/HCC) [E11.43]   • Osteoarthritis of multiple joints [M15.9]   • Mild intermittent asthma without complication [J45.20]   • Hyperlipidemia [E78.5]   • Long-term insulin use (CMS/formerly [facility]) [Z79.4]   • Uncontrolled type 2 diabetes mellitus with complication, without long-term current use of insulin (CMS/formerly [facility]) [E11.8, E11.65]   • Open-angle glaucoma [H40.10X0]     Overview Note:     dgn 2/4/17. Dr Mcdowell.     • Allergic rhinitis [J30.9]   • Acid reflux [K21.9]   • Essential hypertension [I10]   • Osteoarthritis of knee [M17.10]   • Diabetes mellitus type 2, noninsulin dependent (CMS/formerly [facility]) [E11.9]   • Avitaminosis D [E55.9]       Review of Systems   A comprehensive review of the 14 systems was negative except of listed below:  Neurological: positive for numbness and tingling in sabine lower ext mid calf and feet  Endocrine: hyperglycemia     Objective:     Wt Readings from Last 3 Encounters:   09/18/18 67.6 kg (149 lb)   09/11/18 66.7 kg (147 lb)   05/29/18 67.6 kg (149 lb)     Temp Readings from Last 3 Encounters:   09/11/18 98 °F (36.7 °C)   03/06/18 98.2 °F (36.8 °C)   09/05/17 98.4 °F (36.9 °C)     BP Readings from Last 3 Encounters:   09/18/18 124/78   09/11/18 140/82   05/29/18 138/84     Pulse Readings from Last 3 Encounters:   09/11/18 99   03/06/18 99   09/05/17 78        /78   Ht 149.9 cm (59.02\")   Wt 67.6 kg (149 lb)   BMI 30.08 kg/m²     General appearance:  alert, cooperative, delirious, fatigued, nourished\" \"appears stated age and cooperative\" \"NAD   Neck: no carotid bruit, supple, symmetrical, trachea midline and thyroid not enlarged, symmetric, no tenderness/mass/nodules   Thyroid:  no palpable nodule, no enlargement, no tenderness   Lung:  \"clear to auscultation bilaterally\" \"no abnormal breath sounds  \" effort was normal, no labored breath, no use of accessory muscles.   Heart: " "regular rate and rhythm, S1, S2 normal, no murmur, click, rub or gallop      Abdomen:  normal bowel sounds- 4 quads, soft non-tender and contour - slt rounded      Extremities: extremities normal, atraumatic, no cyanosis or edema extremities normal, atraumatic, no cyanosis or edema\" WNL - gait and station, strength and stability\"       Skin:   Pulses:  warm and dry, no hyperpigmentation, normal skin coloring, or suspicious lesions   2+ and symmetric   Neuro: Alert and oriented x3. Gait normal. Reflexes and motor strength normal and symmetric. Cranial nerves 2-12 and sensation grossly intact.      Psych: behavior - normal, judgement - normal, mood - normal, affect - normal                 Lab Review  Results for orders placed or performed in visit on 08/30/18   Fructosamine   Result Value Ref Range    Fructosamine 431 (H) 0 - 285 umol/L   Comprehensive Metabolic Panel   Result Value Ref Range    Glucose 200 (H) 65 - 99 mg/dL    BUN 17 8 - 23 mg/dL    Creatinine 0.90 0.57 - 1.00 mg/dL    eGFR Non African Am 61 >60 mL/min/1.73    eGFR African Am 73 >60 mL/min/1.73    BUN/Creatinine Ratio 18.9 7.0 - 25.0    Sodium 140 136 - 145 mmol/L    Potassium 3.7 3.5 - 5.2 mmol/L    Chloride 96 (L) 98 - 107 mmol/L    Total CO2 27.6 22.0 - 29.0 mmol/L    Calcium 10.0 8.6 - 10.5 mg/dL    Total Protein 7.7 6.0 - 8.5 g/dL    Albumin 4.80 3.50 - 5.20 g/dL    Globulin 2.9 gm/dL    A/G Ratio 1.7 g/dL    Total Bilirubin 0.4 0.1 - 1.2 mg/dL    Alkaline Phosphatase 118 (H) 39 - 117 U/L    AST (SGOT) 16 1 - 32 U/L    ALT (SGPT) 20 1 - 33 U/L   C-Peptide   Result Value Ref Range    C-Peptide 2.3 1.1 - 4.4 ng/mL   Hemoglobin A1c   Result Value Ref Range    Hemoglobin A1C 9.10 (H) 4.80 - 5.60 %   Insulin, Total   Result Value Ref Range    Insulin 7.9 2.6 - 24.9 uIU/mL   Lipid Panel   Result Value Ref Range    Total Cholesterol 202 (H) 0 - 200 mg/dL    Triglycerides 174 (H) 0 - 150 mg/dL    HDL Cholesterol 73 (H) 40 - 60 mg/dL    VLDL Cholesterol " 34.8 5 - 40 mg/dL    LDL Cholesterol  94 0 - 100 mg/dL   Uric Acid   Result Value Ref Range    Uric Acid 5.2 2.4 - 5.7 mg/dL   Vitamin D 25 Hydroxy   Result Value Ref Range    25 Hydroxy, Vitamin D 37.1 30.0 - 100.0 ng/ml   TSH   Result Value Ref Range    TSH 1.430 0.270 - 4.200 mIU/mL   Thyroid Antibodies   Result Value Ref Range    Thyroid Peroxidase Antibody 8 0 - 34 IU/mL    Thyroglobulin Ab <1.0 0.0 - 0.9 IU/mL   T4, Free   Result Value Ref Range    Free T4 1.73 (H) 0.93 - 1.70 ng/dL   T4   Result Value Ref Range    T4, Total 10.37 4.50 - 11.70 mcg/dL   T3, Free   Result Value Ref Range    T3, Free 2.0 2.0 - 4.4 pg/mL   T3   Result Value Ref Range    T3, Total 84.1 80.0 - 200.0 ng/dl   Microalbumin / Creatinine Urine Ratio   Result Value Ref Range    Creatinine, Urine 58.0 Not Estab. mg/dL    Microalbumin, Urine 7.1 Not Estab. ug/mL    Microalbumin/Creatinine Ratio 12.2 0.0 - 30.0 mg/g creat   Cardiovascular Risk Assessment   Result Value Ref Range    Interpretation Note    Diabetes Patient Education   Result Value Ref Range    PDF Image Not applicable            Assessment:   Antonietta was seen today for follow-up.    Diagnoses and all orders for this visit:    Uncontrolled type 2 diabetes mellitus with complication, without long-term current use of insulin (CMS/[facility])  -     gabapentin (NEURONTIN) 300 MG capsule; Take 1 capsule by mouth Daily.  -     Insulin Degludec 200 UNIT/ML solution pen-injector; Inject 20 Units under the skin into the appropriate area as directed Daily. Titrate as instructed with max of 50 units daily  -     sitaGLIPtin-metFORMIN (JANUMET)  MG per tablet; Take 1 tablet by mouth 2 (Two) Times a Day With Meals.  -     Fructosamine; Future  -     Comprehensive Metabolic Panel; Future  -     C-Peptide; Future  -     Hemoglobin A1c; Future  -     Insulin, Total; Future  -     Lipid Panel; Future  -     Microalbumin / Creatinine Urine Ratio - Urine, Clean Catch; Future  -     Uric Acid;  Future  -     Vitamin D 25 Hydroxy; Future  -     TSH; Future  -     Thyroid Antibodies; Future  -     T4, Free; Future  -     T3, Free; Future    Long-term insulin use (CMS/HCC)  -     Comprehensive Metabolic Panel; Future    Hyperlipidemia, unspecified hyperlipidemia type  -     Comprehensive Metabolic Panel; Future  -     Lipid Panel; Future    Essential hypertension  -     Comprehensive Metabolic Panel; Future    Vitamin D deficiency  -     Comprehensive Metabolic Panel; Future  -     Vitamin D 25 Hydroxy; Future    Diabetic autonomic neuropathy associated with type 2 diabetes mellitus (CMS/HCC)  -     Comprehensive Metabolic Panel; Future          Plan:   In summary:  Patient only taking 1 janumet- can not do adhesive due to rashes. Alternating insulin dosage  Reports sabine lower ext pain, - will treat the neuropathy with lower dose neurontin.      Medication changes:   New instructions for basal insulIn  Tresiba U200 22 units in AM   Titration instructions - increase AM dose 1 units every 1 days if fasting blood sugar is over 110mg/dl     restart Janumet 50/1000 twice daily    Start Neurontin 300mg at bedtime    Additional instructions  home blood tests -  Blood glucose testing: 3 times daily, that are: fasting- 1st thing in morning before eating or drinking, before each meal and 1 or 2 hours after meal  Bedtime, anytime you feel symptoms of hyperglycemia or hypoglycemia (high or low blood sugars)        Education:  interpretation of lab results, blood sugar goals, complications of diabetes mellitus, hypoglycemia prevention and treatment, exercise, illness management, self-monitoring of blood glucose skills, nutrition, carbohydrate counting, site rotation and use of insulin pen        The total face to face time spent was  25 minutes  with additional education given: 14 minutes (greater than 50% of the total time) was spent with counseling and coordination of care on: SMBG with goals, Side effects profiles with  medications, medication use and purposes,    Return in about 3 months (around 12/18/2018). 3 month with Dr. Holley - 2 weeks prior for labs      Dragon transcription disclaimer     Much of this encounter note is an electronic transcription/translation of spoken language to printed text. The electronic translation of spoken language may permit erroneous, or at times, nonsensical words or phrases to be inadvertently transcribed. Although I have reviewed the note for such errors, some may still exist.

## 2018-11-03 DIAGNOSIS — E78.5 HYPERLIPIDEMIA, UNSPECIFIED HYPERLIPIDEMIA TYPE: Primary | ICD-10-CM

## 2018-11-03 DIAGNOSIS — IMO0002 UNCONTROLLED TYPE 2 DIABETES MELLITUS WITH COMPLICATION, WITHOUT LONG-TERM CURRENT USE OF INSULIN: ICD-10-CM

## 2018-11-03 DIAGNOSIS — E55.9 VITAMIN D DEFICIENCY: ICD-10-CM

## 2018-12-03 ENCOUNTER — RESULTS ENCOUNTER (OUTPATIENT)
Dept: ENDOCRINOLOGY | Age: 78
End: 2018-12-03

## 2018-12-03 DIAGNOSIS — IMO0002 UNCONTROLLED TYPE 2 DIABETES MELLITUS WITH COMPLICATION, WITHOUT LONG-TERM CURRENT USE OF INSULIN: ICD-10-CM

## 2018-12-03 DIAGNOSIS — E55.9 VITAMIN D DEFICIENCY: ICD-10-CM

## 2018-12-03 DIAGNOSIS — E78.5 HYPERLIPIDEMIA, UNSPECIFIED HYPERLIPIDEMIA TYPE: ICD-10-CM

## 2018-12-07 DIAGNOSIS — IMO0002 UNCONTROLLED TYPE 2 DIABETES MELLITUS WITH COMPLICATION, WITHOUT LONG-TERM CURRENT USE OF INSULIN: ICD-10-CM

## 2018-12-07 RX ORDER — FLURBIPROFEN SODIUM 0.3 MG/ML
SOLUTION/ DROPS OPHTHALMIC
Qty: 100 EACH | Refills: 3 | Status: SHIPPED | OUTPATIENT
Start: 2018-12-07 | End: 2020-06-17 | Stop reason: SDUPTHER

## 2018-12-18 ENCOUNTER — RESULTS ENCOUNTER (OUTPATIENT)
Dept: ENDOCRINOLOGY | Age: 78
End: 2018-12-18

## 2018-12-18 DIAGNOSIS — E11.43 DIABETIC AUTONOMIC NEUROPATHY ASSOCIATED WITH TYPE 2 DIABETES MELLITUS (HCC): ICD-10-CM

## 2018-12-18 DIAGNOSIS — I10 ESSENTIAL HYPERTENSION: ICD-10-CM

## 2018-12-18 DIAGNOSIS — Z79.4 LONG-TERM INSULIN USE (HCC): ICD-10-CM

## 2018-12-18 DIAGNOSIS — E78.5 HYPERLIPIDEMIA, UNSPECIFIED HYPERLIPIDEMIA TYPE: ICD-10-CM

## 2018-12-18 DIAGNOSIS — IMO0002 UNCONTROLLED TYPE 2 DIABETES MELLITUS WITH COMPLICATION, WITHOUT LONG-TERM CURRENT USE OF INSULIN: ICD-10-CM

## 2018-12-18 DIAGNOSIS — E55.9 VITAMIN D DEFICIENCY: ICD-10-CM

## 2018-12-27 LAB
25(OH)D3+25(OH)D2 SERPL-MCNC: 34 NG/ML (ref 30–100)
ALBUMIN SERPL-MCNC: 4.1 G/DL (ref 3.5–5.2)
ALBUMIN/GLOB SERPL: 1.6 G/DL
ALP SERPL-CCNC: 102 U/L (ref 39–117)
ALT SERPL-CCNC: 20 U/L (ref 1–33)
AST SERPL-CCNC: 17 U/L (ref 1–32)
BILIRUB SERPL-MCNC: 0.4 MG/DL (ref 0.1–1.2)
BUN SERPL-MCNC: 14 MG/DL (ref 8–23)
BUN/CREAT SERPL: 17.9 (ref 7–25)
C PEPTIDE SERPL-MCNC: 1.3 NG/ML (ref 1.1–4.4)
CALCIUM SERPL-MCNC: 9.4 MG/DL (ref 8.6–10.5)
CHLORIDE SERPL-SCNC: 100 MMOL/L (ref 98–107)
CHOLEST SERPL-MCNC: 194 MG/DL (ref 0–200)
CO2 SERPL-SCNC: 29.3 MMOL/L (ref 22–29)
CREAT SERPL-MCNC: 0.78 MG/DL (ref 0.57–1)
GLOBULIN SER CALC-MCNC: 2.6 GM/DL
GLUCOSE SERPL-MCNC: 223 MG/DL (ref 65–99)
HBA1C MFR BLD: 7.99 % (ref 4.8–5.6)
HDLC SERPL-MCNC: 66 MG/DL (ref 40–60)
INTERPRETATION: NORMAL
LDLC SERPL CALC-MCNC: 95 MG/DL (ref 0–100)
Lab: NORMAL
MICROALBUMIN UR-MCNC: 21.6 UG/ML
POTASSIUM SERPL-SCNC: 4.2 MMOL/L (ref 3.5–5.2)
PROT SERPL-MCNC: 6.7 G/DL (ref 6–8.5)
SODIUM SERPL-SCNC: 141 MMOL/L (ref 136–145)
T4 SERPL-MCNC: 8.94 MCG/DL (ref 4.5–11.7)
TRIGL SERPL-MCNC: 166 MG/DL (ref 0–150)
TSH SERPL DL<=0.005 MIU/L-ACNC: 0.94 MIU/ML (ref 0.27–4.2)
URATE SERPL-MCNC: 4.2 MG/DL (ref 2.4–5.7)
VLDLC SERPL CALC-MCNC: 33.2 MG/DL (ref 5–40)

## 2019-01-07 ENCOUNTER — OFFICE VISIT (OUTPATIENT)
Dept: ENDOCRINOLOGY | Age: 79
End: 2019-01-07

## 2019-01-07 VITALS
WEIGHT: 149.4 LBS | HEIGHT: 60 IN | BODY MASS INDEX: 29.33 KG/M2 | RESPIRATION RATE: 16 BRPM | DIASTOLIC BLOOD PRESSURE: 84 MMHG | SYSTOLIC BLOOD PRESSURE: 148 MMHG

## 2019-01-07 DIAGNOSIS — I10 ESSENTIAL HYPERTENSION: ICD-10-CM

## 2019-01-07 DIAGNOSIS — IMO0002 UNCONTROLLED TYPE 2 DIABETES MELLITUS WITH COMPLICATION, WITHOUT LONG-TERM CURRENT USE OF INSULIN: Primary | ICD-10-CM

## 2019-01-07 DIAGNOSIS — Z79.4 LONG-TERM INSULIN USE (HCC): ICD-10-CM

## 2019-01-07 DIAGNOSIS — E55.9 VITAMIN D DEFICIENCY: ICD-10-CM

## 2019-01-07 DIAGNOSIS — E78.2 MIXED HYPERLIPIDEMIA: ICD-10-CM

## 2019-01-07 PROCEDURE — 99214 OFFICE O/P EST MOD 30 MIN: CPT | Performed by: INTERNAL MEDICINE

## 2019-01-07 RX ORDER — BROMOCRIPTINE MESYLATE 0.8 MG/1
6 TABLET ORAL
Qty: 180 TABLET | Refills: 5 | Status: SHIPPED | OUTPATIENT
Start: 2019-01-07 | End: 2020-02-24 | Stop reason: SDUPTHER

## 2019-01-07 RX ORDER — LINAGLIPTIN AND METFORMIN HYDROCHLORIDE 2.5; 1 MG/1; MG/1
2 TABLET, FILM COATED, EXTENDED RELEASE ORAL DAILY
Qty: 60 TABLET | Refills: 5 | Status: SHIPPED | OUTPATIENT
Start: 2019-01-07 | End: 2019-09-03 | Stop reason: SDUPTHER

## 2019-01-07 NOTE — PROGRESS NOTES
"Subjective   Antonietta Gramajo is a 78 y.o. female seen for follow up for DM2, hyperlipidemia, lab review. Patient is checking BG once a day. No BG readings. She states that she is having vision changes and has macular degeneration which she is getting shots for every 6 weeks.     History of Present Illness this is a 78-year-old female known patient with type II diabetes hypertension and dyslipidemia as well as vitamin D deficiency.  Over the course of last 6 months she has had no significant health problems for which to go to the ER or hospital however she is having problem with her eyes which is undergoing workup and therapy.    /84   Resp 16   Ht 151.1 cm (59.5\")   Wt 67.8 kg (149 lb 6.4 oz)   BMI 29.67 kg/m²      Allergies   Allergen Reactions   • Atorvastatin Other (See Comments)   • Invokana [Canagliflozin]      rash   • Oxycodone-Acetaminophen    • Propoxyphene-Acetaminophen    • Sulfa Antibiotics    • Victoza [Liraglutide] Rash       Current Outpatient Medications:   •  albuterol (PROAIR HFA) 108 (90 Base) MCG/ACT inhaler, Inhale 1 puff Every 4 (Four) Hours As Needed for Wheezing., Disp: 1 inhaler, Rfl: 1  •  amLODIPine (NORVASC) 5 MG tablet, Take 1 tablet by mouth Daily., Disp: 30 tablet, Rfl: 5  •  B-D UF III MINI PEN NEEDLES 31G X 5 MM misc, USE ONE PER DAY, Disp: 100 each, Rfl: 3  •  cyclobenzaprine (FLEXERIL) 10 MG tablet, Take 10 mg by mouth 3 (Three) Times a Day As Needed for Muscle Spasms., Disp: , Rfl:   •  gabapentin (NEURONTIN) 300 MG capsule, Take 1 capsule by mouth Daily., Disp: 30 capsule, Rfl: 1  •  glucose blood test strip, Check blood glucose 3-4 times dialy, Disp: 100 each, Rfl: 4  •  Insulin Degludec 200 UNIT/ML solution pen-injector, Inject 20 Units under the skin into the appropriate area as directed Daily. Titrate as instructed with max of 50 units daily, Disp: 5 pen, Rfl: 4  •  latanoprost (XALATAN) 0.005 % ophthalmic solution, , Disp: , Rfl:   •  losartan-hydrochlorothiazide " (HYZAAR) 100-25 MG per tablet, Take 1 tablet by mouth Daily., Disp: 30 tablet, Rfl: 5  •  montelukast (SINGULAIR) 10 MG tablet, Take 1 tablet by mouth Every Night., Disp: 30 tablet, Rfl: 5  •  nabumetone (RELAFEN) 750 MG tablet, Take 1 tablet by mouth 2 (Two) Times a Day As Needed for Mild Pain ., Disp: 60 tablet, Rfl: 5  •  Omega-3 Fatty Acids (FISH OIL) 1000 MG capsule capsule, Take 2 capsules by mouth 2 (Two) Times a Day With Meals., Disp: 240 capsule, Rfl: 4  •  Omeprazole ([employer] OMEPRAZOLE) 20 MG tablet delayed-release, Take  by mouth., Disp: , Rfl:   •  ONETOUCH DELICA LANCETS 33G misc, Check blood glucose 3 times daily DX CODE: e11.65, Disp: 100 each, Rfl: 5  •  sitaGLIPtin-metFORMIN (JANUMET)  MG per tablet, Take 1 tablet by mouth 2 (Two) Times a Day With Meals., Disp: 60 tablet, Rfl: 3  •  Triamcinolone Acetonide (NASACORT) 55 MCG/ACT nasal inhaler, 2 sprays into each nostril daily., Disp: , Rfl:       The following portions of the patient's history were reviewed and updated as appropriate: allergies, current medications, past family history, past medical history, past social history, past surgical history and problem list.    Review of Systems   Constitutional: Negative.    HENT: Negative.    Eyes: Positive for visual disturbance.   Respiratory: Negative.    Cardiovascular: Negative.    Gastrointestinal: Negative.    Endocrine: Negative.    Genitourinary: Negative.    Musculoskeletal: Negative.    Skin: Negative.    Allergic/Immunologic: Negative.    Neurological: Negative.    Hematological: Negative.    Psychiatric/Behavioral: Negative.        Objective   Physical Exam   Constitutional: She is oriented to person, place, and time. She appears well-developed and well-nourished. No distress.   HENT:   Head: Normocephalic and atraumatic.   Right Ear: External ear normal.   Left Ear: External ear normal.   Nose: Nose normal.   Mouth/Throat: Oropharynx is clear and moist. No oropharyngeal exudate.   Eyes:  Conjunctivae and EOM are normal. Pupils are equal, round, and reactive to light. Right eye exhibits no discharge. Left eye exhibits no discharge. No scleral icterus.   Neck: Normal range of motion. Neck supple. No JVD present. No tracheal deviation present. No thyromegaly present.   Cardiovascular: Normal rate, regular rhythm, normal heart sounds and intact distal pulses. Exam reveals no gallop and no friction rub.   No murmur heard.  Pulmonary/Chest: Effort normal and breath sounds normal. No stridor. No respiratory distress. She has no wheezes. She has no rales. She exhibits no tenderness.   Abdominal: Soft. Bowel sounds are normal. She exhibits no distension and no mass. There is no tenderness. There is no rebound and no guarding. No hernia.   Musculoskeletal: Normal range of motion. She exhibits no edema, tenderness or deformity.   Lymphadenopathy:     She has no cervical adenopathy.   Neurological: She is alert and oriented to person, place, and time. She has normal reflexes. She displays normal reflexes. No cranial nerve deficit or sensory deficit. She exhibits normal muscle tone. Coordination normal.   Skin: Skin is warm and dry. No rash noted. She is not diaphoretic. No erythema. No pallor.   Psychiatric: She has a normal mood and affect. Her behavior is normal. Judgment and thought content normal.   Vitals reviewed.       Results for orders placed or performed in visit on 12/03/18   Comprehensive Metabolic Panel   Result Value Ref Range    Glucose 223 (H) 65 - 99 mg/dL    BUN 14 8 - 23 mg/dL    Creatinine 0.78 0.57 - 1.00 mg/dL    eGFR Non African Am 71 >60 mL/min/1.73    eGFR African Am 87 >60 mL/min/1.73    BUN/Creatinine Ratio 17.9 7.0 - 25.0    Sodium 141 136 - 145 mmol/L    Potassium 4.2 3.5 - 5.2 mmol/L    Chloride 100 98 - 107 mmol/L    Total CO2 29.3 (H) 22.0 - 29.0 mmol/L    Calcium 9.4 8.6 - 10.5 mg/dL    Total Protein 6.7 6.0 - 8.5 g/dL    Albumin 4.10 3.50 - 5.20 g/dL    Globulin 2.6 gm/dL     A/G Ratio 1.6 g/dL    Total Bilirubin 0.4 0.1 - 1.2 mg/dL    Alkaline Phosphatase 102 39 - 117 U/L    AST (SGOT) 17 1 - 32 U/L    ALT (SGPT) 20 1 - 33 U/L   C-Peptide   Result Value Ref Range    C-Peptide 1.3 1.1 - 4.4 ng/mL   Hemoglobin A1c   Result Value Ref Range    Hemoglobin A1C 7.99 (H) 4.80 - 5.60 %   Lipid Panel   Result Value Ref Range    Total Cholesterol 194 0 - 200 mg/dL    Triglycerides 166 (H) 0 - 150 mg/dL    HDL Cholesterol 66 (H) 40 - 60 mg/dL    VLDL Cholesterol 33.2 5 - 40 mg/dL    LDL Cholesterol  95 0 - 100 mg/dL   MicroAlbumin, Urine, Random - Urine, Clean Catch   Result Value Ref Range    Microalbumin, Urine 21.6 Not Estab. ug/mL   Uric Acid   Result Value Ref Range    Uric Acid 4.2 2.4 - 5.7 mg/dL   Vitamin D 25 Hydroxy   Result Value Ref Range    25 Hydroxy, Vitamin D 34.0 30.0 - 100.0 ng/ml   T4 & TSH ([facility])   Result Value Ref Range    TSH 0.941 0.270 - 4.200 mIU/mL    T4, Total 8.94 4.50 - 11.70 mcg/dL   Cardiovascular Risk Assessment   Result Value Ref Range    Interpretation Note    Diabetes Patient Education   Result Value Ref Range    PDF Image Not applicable          Assessment/Plan   Diagnoses and all orders for this visit:    Uncontrolled type 2 diabetes mellitus with complication, without long-term current use of insulin (CMS/[facility])  -     T4 & TSH ([facility]); Future  -     Uric Acid; Future  -     Vitamin D 25 Hydroxy; Future  -     Comprehensive Metabolic Panel; Future  -     C-Peptide; Future  -     Hemoglobin A1c; Future  -     Lipid Panel; Future  -     MicroAlbumin, Urine, Random - Urine, Clean Catch; Future    Essential hypertension  -     T4 & TSH ([facility]); Future  -     Uric Acid; Future  -     Vitamin D 25 Hydroxy; Future  -     Comprehensive Metabolic Panel; Future  -     C-Peptide; Future  -     Hemoglobin A1c; Future  -     Lipid Panel; Future  -     MicroAlbumin, Urine, Random - Urine, Clean Catch; Future    Mixed hyperlipidemia  -     T4 & TSH ([facility]); Future  -      Uric Acid; Future  -     Vitamin D 25 Hydroxy; Future  -     Comprehensive Metabolic Panel; Future  -     C-Peptide; Future  -     Hemoglobin A1c; Future  -     Lipid Panel; Future  -     MicroAlbumin, Urine, Random - Urine, Clean Catch; Future    Vitamin D deficiency  -     T4 & TSH ([facility]); Future  -     Uric Acid; Future  -     Vitamin D 25 Hydroxy; Future  -     Comprehensive Metabolic Panel; Future  -     C-Peptide; Future  -     Hemoglobin A1c; Future  -     Lipid Panel; Future  -     MicroAlbumin, Urine, Random - Urine, Clean Catch; Future    Long-term insulin use (CMS/[facility])  -     T4 & TSH ([facility]); Future  -     Uric Acid; Future  -     Vitamin D 25 Hydroxy; Future  -     Comprehensive Metabolic Panel; Future  -     C-Peptide; Future  -     Hemoglobin A1c; Future  -     Lipid Panel; Future  -     MicroAlbumin, Urine, Random - Urine, Clean Catch; Future    Other orders  -     CYCLOSET 0.8 MG tablet; Take 6 tablets by mouth Daily With Breakfast.  -     JENTADUETO XR 2.5-1000 MG tablet sustained-release 24 hour; Take 2 tablets by mouth Daily.               In summary I saw and examined this 78-year-old female for above-mentioned problems.  I reviewed her laboratory evaluation of 12/26/2018 and provided her with a hard copy of it.  Aside from hemoglobin A1c of 7.99 she is otherwise clinically and metabolically stable and therefore we will go ahead and continue her current prescriptions.  I am also adding Cycloset every morning based on day scheduled plan  Which was given to her together with samples.  She will see Ms. Pauline Jonesbrian in 3 months or sooner if needed with laboratory evaluation prior to each office visit.

## 2019-03-05 ENCOUNTER — OFFICE VISIT (OUTPATIENT)
Dept: INTERNAL MEDICINE | Facility: CLINIC | Age: 79
End: 2019-03-05

## 2019-03-05 VITALS
DIASTOLIC BLOOD PRESSURE: 64 MMHG | WEIGHT: 155 LBS | BODY MASS INDEX: 31.25 KG/M2 | TEMPERATURE: 98.2 F | SYSTOLIC BLOOD PRESSURE: 140 MMHG | HEART RATE: 87 BPM | HEIGHT: 59 IN | OXYGEN SATURATION: 98 %

## 2019-03-05 DIAGNOSIS — M15.9 OSTEOARTHRITIS OF MULTIPLE JOINTS, UNSPECIFIED OSTEOARTHRITIS TYPE: ICD-10-CM

## 2019-03-05 DIAGNOSIS — R07.9 CHEST PAIN, UNSPECIFIED TYPE: ICD-10-CM

## 2019-03-05 DIAGNOSIS — J45.20 MILD INTERMITTENT ASTHMA WITHOUT COMPLICATION: ICD-10-CM

## 2019-03-05 DIAGNOSIS — I10 ESSENTIAL HYPERTENSION: ICD-10-CM

## 2019-03-05 DIAGNOSIS — R10.32 LEFT LOWER QUADRANT PAIN: Primary | ICD-10-CM

## 2019-03-05 PROCEDURE — 93000 ELECTROCARDIOGRAM COMPLETE: CPT | Performed by: FAMILY MEDICINE

## 2019-03-05 PROCEDURE — 99214 OFFICE O/P EST MOD 30 MIN: CPT | Performed by: FAMILY MEDICINE

## 2019-03-05 RX ORDER — MONTELUKAST SODIUM 10 MG/1
10 TABLET ORAL NIGHTLY
Qty: 30 TABLET | Refills: 5 | Status: SHIPPED | OUTPATIENT
Start: 2019-03-05 | End: 2019-05-24

## 2019-03-05 RX ORDER — NABUMETONE 750 MG/1
750 TABLET, FILM COATED ORAL 2 TIMES DAILY PRN
Qty: 60 TABLET | Refills: 5 | Status: SHIPPED | OUTPATIENT
Start: 2019-03-05 | End: 2019-09-06 | Stop reason: SDUPTHER

## 2019-03-05 RX ORDER — LOSARTAN POTASSIUM AND HYDROCHLOROTHIAZIDE 25; 100 MG/1; MG/1
1 TABLET ORAL DAILY
Qty: 30 TABLET | Refills: 5 | Status: SHIPPED | OUTPATIENT
Start: 2019-03-05 | End: 2019-09-06 | Stop reason: SDUPTHER

## 2019-03-05 RX ORDER — AMLODIPINE BESYLATE 5 MG/1
5 TABLET ORAL DAILY
Qty: 30 TABLET | Refills: 5 | Status: SHIPPED | OUTPATIENT
Start: 2019-03-05 | End: 2019-09-06 | Stop reason: SDUPTHER

## 2019-03-05 NOTE — PROGRESS NOTES
Subjective   Antonietta Gramajo is a 78 y.o. female.   Chief Complaint   Patient presents with   • Asthma   • Osteoarthritis   • Hypertension   • Abdominal Pain   • Chest Pain       History of Present Illness     #1 Chest pain- patient has a history of chest wall pain for years, but over last few weeks she noticed different chest pain.  It is localized in the mid chest and under her left breast.  It is dull pain.  Intensity 4-5 out of 10.  Lasts hours and resolves spontaneously.  She really cannot say what makes it worse.  It does not get better by anything. It usually resolves spontaneously.  It is not affected by breathing.      #2 left lower quadrant abdominal pain-started 2 months ago, but it is worse over last few weeks.  It was sore, but now is becoming sharp pain, more on than off.  Intensity 4 out of 10.  There is no change in bowel movements.  No diarrhea.  No blood in stool.  She has no nausea, no vomiting.  She does not remember when her last colonoscopy was.    #3 hypertension-on losartan/HCTZ 100/25 mg a day and amlodipine 5 mg -she takes it every other day. Patient denies shortness of breath, dizziness, palpitations. No change with LE edema. Normal kidney tests and electrolytes.  She does not exercise regularly.         #4osteoarthritis- shoulders hands and knees. Pain is achy, it comes and goes. It is better with nabumetone. Patient is using it twice a day. If she forgets to take it she gets pain. It helps her to function. She had seen no blood in stool, no black stools.     #5 Asthma-patient is off singular.  She stopped it because she read that it can cause legs pain.  February is her worst months so she has no more runny nose, congestion and occasional cough.  She uses Albuterol as needed.      She had Prevnar 13 in 9/2017.  Pneumovax in 2007.    She has diabetes.  She follows up with Dr. Holley.  Last A1c was at 7.99.      The following portions of the patient's history were reviewed and updated as  appropriate: allergies, current medications, past family history, past medical history, past social history, past surgical history and problem list.    Review of Systems   Constitutional: Negative.    HENT: Positive for congestion and postnasal drip.    Respiratory: Negative.  Negative for shortness of breath and wheezing.    Cardiovascular: Positive for chest pain. Negative for palpitations.   Gastrointestinal: Positive for abdominal pain. Negative for blood in stool, constipation, nausea and vomiting.         Objective   Wt Readings from Last 3 Encounters:   03/05/19 70.3 kg (155 lb)   01/07/19 67.8 kg (149 lb 6.4 oz)   09/18/18 67.6 kg (149 lb)      Vitals:    03/05/19 1137   BP: 140/64   Pulse: 87   Temp: 98.2 °F (36.8 °C)   SpO2: 98%     Temp Readings from Last 3 Encounters:   03/05/19 98.2 °F (36.8 °C)   09/11/18 98 °F (36.7 °C)   03/06/18 98.2 °F (36.8 °C)     BP Readings from Last 3 Encounters:   03/05/19 140/64   01/07/19 148/84   09/18/18 124/78     Pulse Readings from Last 3 Encounters:   03/05/19 87   09/11/18 99   03/06/18 99       Physical Exam   Constitutional: She is oriented to person, place, and time. She appears well-developed and well-nourished.   HENT:   Head: Normocephalic and atraumatic.   Neck: Neck supple. Carotid bruit is not present. No thyromegaly present.   Cardiovascular: Normal rate, regular rhythm and normal heart sounds.   Pulmonary/Chest: Effort normal and breath sounds normal.   Abdominal: Soft. She exhibits no mass. There is tenderness. There is no rebound and no guarding.   TTP in LLQ.  No rebound, no guarding.   Neurological: She is alert and oriented to person, place, and time.   Skin: Skin is warm, dry and intact.   Psychiatric: She has a normal mood and affect. Her behavior is normal.       Assessment/Plan   Antonietta was seen today for asthma, osteoarthritis, hypertension, abdominal pain and chest pain.    Diagnoses and all orders for this visit:    Left lower quadrant pain  -      CT Abdomen Pelvis Without Contrast; Future    Chest pain, unspecified type  -     Ambulatory Referral to Cardiology    Essential hypertension    Mild intermittent asthma without complication    Osteoarthritis of multiple joints, unspecified osteoarthritis type    Other orders  -     losartan-hydrochlorothiazide (HYZAAR) 100-25 MG per tablet; Take 1 tablet by mouth Daily.  -     montelukast (SINGULAIR) 10 MG tablet; Take 1 tablet by mouth Every Night.  -     amLODIPine (NORVASC) 5 MG tablet; Take 1 tablet by mouth Daily.  -     nabumetone (RELAFEN) 750 MG tablet; Take 1 tablet by mouth 2 (Two) Times a Day As Needed for Mild Pain .        #1 chest pain-EKG in the office is negative.  Tracing and interpretation will be scanned.  Patient has risk factors.  I'm referring her to cardiologist for further evaluation.  ER warnings.      #2 left lower quadrant abdominal pain-new and uncontrolled problem.  We are checking abdominal CT.  If worsening she will go to ER.     #3 hypertension-uncontrolled.  Patient will take amlodipine 5 mg every day, not every other day.  Follow-up in a month, or slurred blood pressure stays at or above 140/90.      #4 osteoarthritis-will continue same.  Risks of kidney failure, bleeding and cardiovascular risks.  Follow-up in 6 months.      #5 asthma-patient is willing to try singular again.  Follow-up in 6 months, or sooner if problems.

## 2019-03-18 ENCOUNTER — HOSPITAL ENCOUNTER (OUTPATIENT)
Dept: CT IMAGING | Facility: HOSPITAL | Age: 79
Discharge: HOME | End: 2019-03-18
Admitting: FAMILY MEDICINE

## 2019-03-18 DIAGNOSIS — R10.32 LEFT LOWER QUADRANT PAIN: ICD-10-CM

## 2019-03-18 PROCEDURE — 74176 CT ABD & PELVIS W/O CONTRAST: CPT

## 2019-03-19 DIAGNOSIS — R10.32 LEFT LOWER QUADRANT PAIN: Primary | ICD-10-CM

## 2019-03-26 ENCOUNTER — OFFICE VISIT (OUTPATIENT)
Dept: CARDIOLOGY | Facility: CLINIC | Age: 79
End: 2019-03-26

## 2019-03-26 VITALS
DIASTOLIC BLOOD PRESSURE: 80 MMHG | WEIGHT: 152 LBS | HEIGHT: 59 IN | BODY MASS INDEX: 30.64 KG/M2 | SYSTOLIC BLOOD PRESSURE: 130 MMHG | HEART RATE: 100 BPM

## 2019-03-26 DIAGNOSIS — R06.09 DYSPNEA ON EXERTION: ICD-10-CM

## 2019-03-26 DIAGNOSIS — E11.9 TYPE 2 DIABETES MELLITUS WITHOUT COMPLICATION, WITH LONG-TERM CURRENT USE OF INSULIN (HCC): ICD-10-CM

## 2019-03-26 DIAGNOSIS — R07.2 PRECORDIAL PAIN: Primary | ICD-10-CM

## 2019-03-26 DIAGNOSIS — Z79.4 TYPE 2 DIABETES MELLITUS WITHOUT COMPLICATION, WITH LONG-TERM CURRENT USE OF INSULIN (HCC): ICD-10-CM

## 2019-03-26 DIAGNOSIS — I10 ESSENTIAL HYPERTENSION: ICD-10-CM

## 2019-03-26 PROCEDURE — 93000 ELECTROCARDIOGRAM COMPLETE: CPT | Performed by: INTERNAL MEDICINE

## 2019-03-26 PROCEDURE — 99204 OFFICE O/P NEW MOD 45 MIN: CPT | Performed by: INTERNAL MEDICINE

## 2019-03-26 RX ORDER — ASPIRIN 81 MG/1
81 TABLET ORAL DAILY
COMMUNITY
End: 2020-07-23 | Stop reason: HOSPADM

## 2019-03-26 NOTE — PROGRESS NOTES
PATIENTINFORMATION    Date of Office Visit: 2019  Encounter Provider: Audrey Page MD  Place of Service: [facility] CARDIOLOGY  Patient Name: Antonietta Gramajo  : 1940    Subjective:     Encounter Date:2019      Patient ID: Antonietta Gramajo is a 78 y.o. female.      History of Present Illness    This is a lady with a history of diabetes and hypertension, who presents for chest pain. She states she has chest pain fairly often due to her fibromyalgia, but that feels more superficial. This is a deeper pain inside, which she describes as squeezing, flipping or cramp-like sensation on the inside. One day it radiated down both arms. She has not noted exacerbating factors. She thinks if she sits very still that seems to help. There is no associated shortness of breath, though she does admit shortness of breath with exertion.       Review of Systems   Constitution: Positive for chills and malaise/fatigue. Negative for fever, weight gain and weight loss.   HENT: Positive for hearing loss. Negative for ear pain, nosebleeds and sore throat.    Eyes: Positive for blurred vision and pain. Negative for double vision, vision loss in left eye and vision loss in right eye.   Cardiovascular:        See history of present illness.   Respiratory: Positive for cough, shortness of breath and wheezing. Negative for sleep disturbances due to breathing and snoring.    Endocrine: Positive for cold intolerance. Negative for heat intolerance and polyuria.   Skin: Positive for itching. Negative for poor wound healing and rash.   Musculoskeletal: Positive for joint pain and myalgias. Negative for joint swelling.   Gastrointestinal: Positive for abdominal pain. Negative for diarrhea, hematochezia, nausea and vomiting.   Genitourinary: Negative for hematuria and hesitancy.   Neurological: Positive for numbness. Negative for paresthesias and seizures.   Psychiatric/Behavioral: Negative for  "depression. The patient is not nervous/anxious.            ECG 12 Lead  Date/Time: 3/26/2019 12:55 PM  Performed by: Audrey Page MD  Authorized by: Audrey Page MD   Comparison: compared with previous ECG from 3/5/2019  Similar to previous ECG  Rhythm: sinus rhythm  BPM: 100  Conduction: conduction normal  ST Segments: ST segments normal  T Waves: T waves normal    Clinical impression: normal ECG               Objective:     /80 (BP Location: Right arm)   Pulse 100   Ht 149.9 cm (59\")   Wt 68.9 kg (152 lb)   BMI 30.70 kg/m²  Body mass index is 30.7 kg/m².     Physical Exam   Constitutional: She appears well-developed.   HENT:   Head: Normocephalic and atraumatic.   Eyes: Conjunctivae and lids are normal. Pupils are equal, round, and reactive to light. Lids are everted and swept, no foreign bodies found.   Neck: Normal range of motion. No JVD present. Carotid bruit is not present. No tracheal deviation present. No thyroid mass present.   Cardiovascular: Normal rate, regular rhythm and normal heart sounds.   Pulses:       Dorsalis pedis pulses are 2+ on the right side, and 2+ on the left side.   Pulmonary/Chest: Effort normal and breath sounds normal.   Abdominal: Normal appearance and bowel sounds are normal.   Musculoskeletal: Normal range of motion.   Neurological: She is alert. She has normal strength.   Skin: Skin is warm, dry and intact.   Psychiatric: She has a normal mood and affect. Her behavior is normal.   Vitals reviewed.          Assessment/Plan:       1.  Chest pain. She has not had a cardiac evaluation and has risk factors including diabetes, hypertension, and a family history of heart disease. I recommend that we do a stress echocardiogram for further evaluation. If that is normal, that would be reassuring that there are no active cardiac issues going on and this pain may be more musculoskeletal.   2.  Systemic hypertension. Controlled.   3.  Diabetes type 2.   4.  Father with " heart attack at 59 and a brother with heart attack at an unknown age.   5.  Fibromyalgia.   6.  Asthma.     Myself and my staff will go over the results of her stress echo when it is available.       Orders Placed This Encounter   Procedures   • ECG 12 Lead     This order was created via procedure documentation   • Adult Stress Echo W/ Cont or Stress Agent if Necessary Per Protocol     Standing Status:   Future     Standing Expiration Date:   3/26/2020     Order Specific Question:   What stress agent will be used?     Answer:   Exercise with Possible [employer]     Order Specific Question:   Reason for exam?     Answer:   Chest Pain     Order Specific Question:   Reason for exam?     Answer:   Dyspnea        Discharge Medications           Accurate as of 3/26/19 12:59 PM. If you have any questions, ask your nurse or doctor.               Continue These Medications      Instructions Start Date   albuterol sulfate  (90 Base) MCG/ACT inhaler  Commonly known as:  PROAIR HFA   1 puff, Inhalation, Every 4 Hours PRN      amLODIPine 5 MG tablet  Commonly known as:  NORVASC   5 mg, Oral, Daily      aspirin 81 MG EC tablet   81 mg, Oral, Daily      B-D UF III MINI PEN NEEDLES 31G X 5 MM misc  Generic drug:  Insulin Pen Needle   USE ONE PER DAY      [employer] OMEPRAZOLE 20 MG tablet delayed-release  Generic drug:  Omeprazole   Oral      cyclobenzaprine 10 MG tablet  Commonly known as:  FLEXERIL   10 mg, Oral, 3 Times Daily PRN      CYCLOSET 0.8 MG tablet  Generic drug:  Bromocriptine Mesylate   6 tablets, Oral, Daily With Breakfast      fish oil 1000 MG capsule capsule   2,000 mg, Oral, 2 Times Daily With Meals      gabapentin 300 MG capsule  Commonly known as:  NEURONTIN   300 mg, Oral, Daily      glucose blood test strip   Check blood glucose 3-4 times dialy      Insulin Degludec 200 UNIT/ML solution pen-injector   20 Units, Subcutaneous, Daily, Titrate as instructed with max of 50 units daily      JENTADUETO XR 2.5-1000  MG tablet sustained-release 24 hour  Generic drug:  Linagliptin-metFORMIN HCl ER   2 tablets, Oral, Daily      latanoprost 0.005 % ophthalmic solution  Commonly known as:  XALATAN   No dose, route, or frequency recorded.      losartan-hydrochlorothiazide 100-25 MG per tablet  Commonly known as:  HYZAAR   1 tablet, Oral, Daily      montelukast 10 MG tablet  Commonly known as:  SINGULAIR   10 mg, Oral, Nightly      nabumetone 750 MG tablet  Commonly known as:  RELAFEN   750 mg, Oral, 2 Times Daily PRN      ONETOUCH DELICA LANCETS 33G misc   Check blood glucose 3 times daily DX CODE: e11.65      Triamcinolone Acetonide 55 MCG/ACT nasal inhaler  Commonly known as:  NASACORT   2 sprays, Nasal, Daily                    Audrey Page MD  03/26/19  12:59 PM

## 2019-04-15 ENCOUNTER — PRIOR AUTHORIZATION (OUTPATIENT)
Dept: ENDOCRINOLOGY | Age: 79
End: 2019-04-15

## 2019-04-18 ENCOUNTER — PRIOR AUTHORIZATION (OUTPATIENT)
Dept: ENDOCRINOLOGY | Age: 79
End: 2019-04-18

## 2019-04-30 ENCOUNTER — RESULTS ENCOUNTER (OUTPATIENT)
Dept: ENDOCRINOLOGY | Age: 79
End: 2019-04-30

## 2019-04-30 DIAGNOSIS — E78.2 MIXED HYPERLIPIDEMIA: ICD-10-CM

## 2019-04-30 DIAGNOSIS — I10 ESSENTIAL HYPERTENSION: ICD-10-CM

## 2019-04-30 DIAGNOSIS — E55.9 VITAMIN D DEFICIENCY: ICD-10-CM

## 2019-04-30 DIAGNOSIS — Z79.4 LONG-TERM INSULIN USE (HCC): ICD-10-CM

## 2019-04-30 DIAGNOSIS — IMO0002 UNCONTROLLED TYPE 2 DIABETES MELLITUS WITH COMPLICATION, WITHOUT LONG-TERM CURRENT USE OF INSULIN: ICD-10-CM

## 2019-05-08 DIAGNOSIS — IMO0002 UNCONTROLLED TYPE 2 DIABETES MELLITUS WITH COMPLICATION, WITHOUT LONG-TERM CURRENT USE OF INSULIN: Primary | ICD-10-CM

## 2019-05-08 DIAGNOSIS — E55.9 VITAMIN D DEFICIENCY: ICD-10-CM

## 2019-05-08 DIAGNOSIS — E55.9 AVITAMINOSIS D: ICD-10-CM

## 2019-05-08 DIAGNOSIS — E78.2 MIXED HYPERLIPIDEMIA: ICD-10-CM

## 2019-05-10 ENCOUNTER — LAB (OUTPATIENT)
Dept: ENDOCRINOLOGY | Age: 79
End: 2019-05-10

## 2019-05-10 DIAGNOSIS — IMO0002 UNCONTROLLED TYPE 2 DIABETES MELLITUS WITH COMPLICATION, WITHOUT LONG-TERM CURRENT USE OF INSULIN: ICD-10-CM

## 2019-05-10 DIAGNOSIS — E55.9 AVITAMINOSIS D: ICD-10-CM

## 2019-05-10 DIAGNOSIS — E78.2 MIXED HYPERLIPIDEMIA: ICD-10-CM

## 2019-05-10 DIAGNOSIS — E55.9 VITAMIN D DEFICIENCY: ICD-10-CM

## 2019-05-11 LAB
25(OH)D3+25(OH)D2 SERPL-MCNC: 31 NG/ML (ref 30–100)
ALBUMIN SERPL-MCNC: 4.9 G/DL (ref 3.5–5.2)
ALBUMIN/GLOB SERPL: 2.2 G/DL
ALP SERPL-CCNC: 106 U/L (ref 39–117)
ALT SERPL-CCNC: 20 U/L (ref 1–33)
AST SERPL-CCNC: 16 U/L (ref 1–32)
BILIRUB SERPL-MCNC: 0.4 MG/DL (ref 0.2–1.2)
BUN SERPL-MCNC: 15 MG/DL (ref 8–23)
BUN/CREAT SERPL: 19.2 (ref 7–25)
C PEPTIDE SERPL-MCNC: 1.8 NG/ML (ref 1.1–4.4)
CALCIUM SERPL-MCNC: 10.3 MG/DL (ref 8.6–10.5)
CHLORIDE SERPL-SCNC: 99 MMOL/L (ref 98–107)
CHOLEST SERPL-MCNC: 196 MG/DL (ref 0–200)
CO2 SERPL-SCNC: 29.2 MMOL/L (ref 22–29)
CREAT SERPL-MCNC: 0.78 MG/DL (ref 0.57–1)
GLOBULIN SER CALC-MCNC: 2.2 GM/DL
GLUCOSE SERPL-MCNC: 159 MG/DL (ref 65–99)
HBA1C MFR BLD: 8.1 % (ref 4.8–5.6)
HDLC SERPL-MCNC: 66 MG/DL (ref 40–60)
INTERPRETATION: NORMAL
LDLC SERPL CALC-MCNC: 76 MG/DL (ref 0–100)
Lab: NORMAL
POTASSIUM SERPL-SCNC: 3.6 MMOL/L (ref 3.5–5.2)
PROT SERPL-MCNC: 7.1 G/DL (ref 6–8.5)
SODIUM SERPL-SCNC: 142 MMOL/L (ref 136–145)
TRIGL SERPL-MCNC: 269 MG/DL (ref 0–150)
VLDLC SERPL CALC-MCNC: 53.8 MG/DL

## 2019-05-24 ENCOUNTER — OFFICE VISIT (OUTPATIENT)
Dept: ENDOCRINOLOGY | Age: 79
End: 2019-05-24

## 2019-05-24 VITALS
WEIGHT: 151 LBS | SYSTOLIC BLOOD PRESSURE: 130 MMHG | HEIGHT: 60 IN | DIASTOLIC BLOOD PRESSURE: 68 MMHG | BODY MASS INDEX: 29.64 KG/M2

## 2019-05-24 DIAGNOSIS — Z79.4 LONG-TERM INSULIN USE (HCC): ICD-10-CM

## 2019-05-24 DIAGNOSIS — E11.43 DIABETIC AUTONOMIC NEUROPATHY ASSOCIATED WITH TYPE 2 DIABETES MELLITUS (HCC): Primary | ICD-10-CM

## 2019-05-24 DIAGNOSIS — I10 ESSENTIAL HYPERTENSION: ICD-10-CM

## 2019-05-24 DIAGNOSIS — IMO0002 UNCONTROLLED TYPE 2 DIABETES MELLITUS WITH COMPLICATION, WITHOUT LONG-TERM CURRENT USE OF INSULIN: ICD-10-CM

## 2019-05-24 DIAGNOSIS — E78.2 MIXED HYPERLIPIDEMIA: ICD-10-CM

## 2019-05-24 DIAGNOSIS — E55.9 VITAMIN D DEFICIENCY: ICD-10-CM

## 2019-05-24 PROCEDURE — 99214 OFFICE O/P EST MOD 30 MIN: CPT | Performed by: NURSE PRACTITIONER

## 2019-05-24 NOTE — PROGRESS NOTES
"Subjective   Antonietta Gramajo is a 78 y.o. female is here today for follow-up.  Chief Complaint   Patient presents with   • Diabetes     recent labs, testing BG 2 times daily, pt did not bring meter   • Hyperlipidemia   • Hypertension   • Vitamin D Deficiency     /68   Ht 152.4 cm (60\")   Wt 68.5 kg (151 lb)   BMI 29.49 kg/m²   Current Outpatient Medications on File Prior to Visit   Medication Sig   • albuterol (PROAIR HFA) 108 (90 Base) MCG/ACT inhaler Inhale 1 puff Every 4 (Four) Hours As Needed for Wheezing.   • amLODIPine (NORVASC) 5 MG tablet Take 1 tablet by mouth Daily.   • aspirin 81 MG EC tablet Take 81 mg by mouth Daily.   • B-D UF III MINI PEN NEEDLES 31G X 5 MM misc USE ONE PER DAY   • cyclobenzaprine (FLEXERIL) 10 MG tablet Take 10 mg by mouth 3 (Three) Times a Day As Needed for Muscle Spasms.   • CYCLOSET 0.8 MG tablet Take 6 tablets by mouth Daily With Breakfast.   • glucose blood test strip Check blood glucose 3-4 times dialy   • JENTADUETO XR 2.5-1000 MG tablet sustained-release 24 hour Take 2 tablets by mouth Daily.   • latanoprost (XALATAN) 0.005 % ophthalmic solution    • losartan-hydrochlorothiazide (HYZAAR) 100-25 MG per tablet Take 1 tablet by mouth Daily.   • nabumetone (RELAFEN) 750 MG tablet Take 1 tablet by mouth 2 (Two) Times a Day As Needed for Mild Pain .   • Omega-3 Fatty Acids (FISH OIL) 1000 MG capsule capsule Take 2 capsules by mouth 2 (Two) Times a Day With Meals.   • Omeprazole ([employer] OMEPRAZOLE) 20 MG tablet delayed-release Take  by mouth.   • ONETOUCH DELICA LANCETS 33G misc Check blood glucose 3 times daily DX CODE: e11.65   • Triamcinolone Acetonide (NASACORT) 55 MCG/ACT nasal inhaler 2 sprays into each nostril daily.   • [DISCONTINUED] Insulin Degludec 200 UNIT/ML solution pen-injector Inject 20 Units under the skin into the appropriate area as directed Daily. Titrate as instructed with max of 50 units daily   • gabapentin (NEURONTIN) 300 MG capsule Take 1 capsule by " mouth Daily.   • [DISCONTINUED] montelukast (SINGULAIR) 10 MG tablet Take 1 tablet by mouth Every Night.     No current facility-administered medications on file prior to visit.      Allergies   Allergen Reactions   • Atorvastatin Other (See Comments)   • Invokana [Canagliflozin]      rash   • Oxycodone-Acetaminophen    • Propoxyphene-Acetaminophen    • Sulfa Antibiotics    • Victoza [Liraglutide] Rash     Family History   Problem Relation Age of Onset   • Stroke Mother    • Diabetes Mother    • Heart attack Father    • Breast cancer Sister    • Hypertension Sister    • Lung cancer Brother    • Hypertension Brother    • Heart attack Brother      Social History     Tobacco Use   • Smoking status: Never Smoker   • Smokeless tobacco: Never Used   Substance Use Topics   • Alcohol use: No   • Drug use: No         History of Present Illness   Encounter Diagnoses   Name Primary?   • Essential hypertension    • Mixed hyperlipidemia    • Vitamin D deficiency    • Long-term insulin use (CMS/[facility])    • Diabetic autonomic neuropathy associated with type 2 diabetes mellitus (CMS/[facility]) Yes   • Uncontrolled type 2 diabetes mellitus with complication, without long-term current use of insulin (CMS/[facility])      Patient is a 78-year-old female who presents today for routine follow-up.  Patient is being seen for the above listed problems.  Patient did not bring her glucometer to visit today.  Reports that her blood sugars can range from 80-1 70.  Reports blood sugar this morning was 170.  Patient reports being symptomatic when her glucose levels are in the 80s.  Patient however denies any signs or symptoms of  hyperglycemia.  Denies changes in vision, polyuria, numbness, tingling, increased thirst.  Patient reports that when her glucose level in the morning is less than 100 she does not administer her daily insulin.  Patient has known history of vitamin D deficiency.  However, patient was put on vitamin D 2 years ago and caused legs to be  sore.  Patient reports she does try to get outside when the weather is nice.  Patient sees ophthalmologist regularly.  Patient reports she just had cataract surgery 6 weeks ago.  Patient also reports she has macular degeneration in both eyes.  Reports that she has wet and left and dry and right.  Reports vision and right is fine however vision and left is affected.  Patient had labs prior to today's visit.  Will review with patient.    The following portions of the patient's history were reviewed and updated as appropriate: allergies, current medications, past family history, past medical history, past social history, past surgical history and problem list.    Review of Systems   Constitutional: Negative for fatigue.   HENT: Negative for trouble swallowing.    Eyes: Positive for visual disturbance.        Reports recent cataract surgery.    Reports she has macular degeneration disease in both eyes, wet and left and dry and right.  Reports vision in left is more affected than right.   Respiratory: Negative for cough and shortness of breath.    Cardiovascular: Negative for leg swelling.   Gastrointestinal: Negative for constipation and diarrhea.   Endocrine: Negative for polyuria.   Genitourinary: Negative for dysuria.   Skin: Negative for wound.   Neurological: Negative for numbness.       Objective   Physical Exam   Constitutional: She is oriented to person, place, and time. She appears well-developed and well-nourished. No distress.   HENT:   Head: Normocephalic and atraumatic.   Right Ear: External ear normal.   Left Ear: External ear normal.   Nose: Nose normal.   Mouth/Throat: Oropharynx is clear and moist. No oropharyngeal exudate.   Eyes: EOM are normal. Pupils are equal, round, and reactive to light. Right eye exhibits no discharge. Left eye exhibits no discharge.   Neck: Trachea normal, normal range of motion and full passive range of motion without pain. Neck supple. No tracheal tenderness present. Carotid  bruit is not present. No tracheal deviation, no edema and no erythema present. No thyroid mass and no thyromegaly present.   Cardiovascular: Normal rate, regular rhythm, normal heart sounds and intact distal pulses. Exam reveals no gallop and no friction rub.   No murmur heard.  Pulmonary/Chest: Effort normal and breath sounds normal. No stridor. No respiratory distress. She has no wheezes. She has no rales.   Abdominal: Soft. Bowel sounds are normal. She exhibits no distension.   Musculoskeletal: Normal range of motion. She exhibits edema. She exhibits no deformity.   Trace edema to bilateral legs   Lymphadenopathy:     She has no cervical adenopathy.   Neurological: She is alert and oriented to person, place, and time.   Skin: Skin is warm and dry. No rash noted. She is not diaphoretic. No erythema. No pallor.   Psychiatric: She has a normal mood and affect. Her behavior is normal. Judgment and thought content normal.   Nursing note and vitals reviewed.    Results for orders placed or performed in visit on 05/10/19   Hemoglobin A1c   Result Value Ref Range    Hemoglobin A1C 8.10 (H) 4.80 - 5.60 %   Vitamin D 25 Hydroxy   Result Value Ref Range    25 Hydroxy, Vitamin D 31.0 30.0 - 100.0 ng/ml   Lipid Panel   Result Value Ref Range    Total Cholesterol 196 0 - 200 mg/dL    Triglycerides 269 (H) 0 - 150 mg/dL    HDL Cholesterol 66 (H) 40 - 60 mg/dL    VLDL Cholesterol 53.8 mg/dL    LDL Cholesterol  76 0 - 100 mg/dL   Comprehensive Metabolic Panel   Result Value Ref Range    Glucose 159 (H) 65 - 99 mg/dL    BUN 15 8 - 23 mg/dL    Creatinine 0.78 0.57 - 1.00 mg/dL    eGFR Non African Am 71 >60 mL/min/1.73    eGFR African Am 87 >60 mL/min/1.73    BUN/Creatinine Ratio 19.2 7.0 - 25.0    Sodium 142 136 - 145 mmol/L    Potassium 3.6 3.5 - 5.2 mmol/L    Chloride 99 98 - 107 mmol/L    Total CO2 29.2 (H) 22.0 - 29.0 mmol/L    Calcium 10.3 8.6 - 10.5 mg/dL    Total Protein 7.1 6.0 - 8.5 g/dL    Albumin 4.90 3.50 - 5.20 g/dL     Globulin 2.2 gm/dL    A/G Ratio 2.2 g/dL    Total Bilirubin 0.4 0.2 - 1.2 mg/dL    Alkaline Phosphatase 106 39 - 117 U/L    AST (SGOT) 16 1 - 32 U/L    ALT (SGPT) 20 1 - 33 U/L   C-Peptide   Result Value Ref Range    C-Peptide 1.8 1.1 - 4.4 ng/mL   Cardiovascular Risk Assessment   Result Value Ref Range    Interpretation Note    Diabetes Patient Education   Result Value Ref Range    PDF Image Not applicable            Assessment/Plan   Problems Addressed this Visit        Cardiovascular and Mediastinum    Essential hypertension    Hyperlipidemia       Digestive    Vitamin D deficiency       Endocrine    Uncontrolled type 2 diabetes mellitus with complication, without long-term current use of insulin (CMS/[facility])    Relevant Medications    Insulin Glargine 300 UNIT/ML solution pen-injector    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  Patient is metabolically stable.  Patient's blood pressure is in satisfactory range today.  Patient did not bring her glucometer to today's visit.  Reports that her blood sugars are between 80 and 170.  Patient holding insulin daily if glucose in the morning is less than 100.  Discussed with patient how her insulin is long-term and can affect her next morning glucose reading.  Patient reported that her current insulin is no longer covered by her insurance.  Patient switched to Toujeo 15 units daily.  Patient was instructed to continue monitoring fasting glucose.  Patient instructed to call the office if her glucose readings in the morning are in the 80s.  Patient's A1c slightly elevated above goal at 8.1.  Due to patient's age goal range for A1c is to be less than 8.  Patient has history of vitamin D deficiency.  Patient's vitamin  D is in range, however on the low end of range.  Patient does not tolerate vitamin D supplements.  Patient was encouraged to increase outdoor activities.  Patient's lipid panel reviewed.  All levels are in satisfactory range except for triglycerides.  Triglycerides have elevated since last visit.  When medications were reviewed with patient, patient was only taking 1 capsule of fish oil daily.  Patient instructed to increase to 2 capsules daily.  Will reevaluate lipid panel next visit.  Patient was instructed to notify the office with any questions or concerns prior to next visit.  Patient is to follow-up in 6 months with labs prior.     toujeo 15 units once daily. Take consistently. If you have low blood sugars please let office know. We consider lows less than 70 mg/dl

## 2019-05-24 NOTE — PATIENT INSTRUCTIONS
toujeo 15 units once daily. Take consistently. If you have low blood sugars please let office know. We consider lows less than 70 mg/dl

## 2019-09-04 RX ORDER — LINAGLIPTIN AND METFORMIN HYDROCHLORIDE 2.5; 1 MG/1; MG/1
TABLET, FILM COATED, EXTENDED RELEASE ORAL
Qty: 60 TABLET | Refills: 4 | Status: SHIPPED | OUTPATIENT
Start: 2019-09-04 | End: 2020-02-24 | Stop reason: SDUPTHER

## 2019-09-04 RX ORDER — LINAGLIPTIN AND METFORMIN HYDROCHLORIDE 2.5; 1 MG/1; MG/1
TABLET, FILM COATED, EXTENDED RELEASE ORAL
Qty: 60 TABLET | Refills: 4 | Status: SHIPPED | OUTPATIENT
Start: 2019-09-04 | End: 2019-09-06

## 2019-09-06 ENCOUNTER — OFFICE VISIT (OUTPATIENT)
Dept: INTERNAL MEDICINE | Facility: CLINIC | Age: 79
End: 2019-09-06

## 2019-09-06 VITALS
HEART RATE: 83 BPM | BODY MASS INDEX: 29.06 KG/M2 | WEIGHT: 148 LBS | DIASTOLIC BLOOD PRESSURE: 80 MMHG | HEIGHT: 60 IN | SYSTOLIC BLOOD PRESSURE: 146 MMHG | TEMPERATURE: 98.8 F | OXYGEN SATURATION: 99 %

## 2019-09-06 DIAGNOSIS — I10 ESSENTIAL HYPERTENSION: Primary | ICD-10-CM

## 2019-09-06 DIAGNOSIS — M15.9 OSTEOARTHRITIS OF MULTIPLE JOINTS, UNSPECIFIED OSTEOARTHRITIS TYPE: ICD-10-CM

## 2019-09-06 DIAGNOSIS — J45.20 MILD INTERMITTENT ASTHMA WITHOUT COMPLICATION: ICD-10-CM

## 2019-09-06 PROCEDURE — 99214 OFFICE O/P EST MOD 30 MIN: CPT | Performed by: FAMILY MEDICINE

## 2019-09-06 RX ORDER — NABUMETONE 750 MG/1
750 TABLET, FILM COATED ORAL 2 TIMES DAILY PRN
Qty: 60 TABLET | Refills: 5 | Status: SHIPPED | OUTPATIENT
Start: 2019-09-06 | End: 2020-07-23 | Stop reason: HOSPADM

## 2019-09-06 RX ORDER — LOSARTAN POTASSIUM AND HYDROCHLOROTHIAZIDE 25; 100 MG/1; MG/1
1 TABLET ORAL DAILY
Qty: 30 TABLET | Refills: 5 | Status: SHIPPED | OUTPATIENT
Start: 2019-09-06 | End: 2020-02-24 | Stop reason: SDUPTHER

## 2019-09-06 RX ORDER — AMLODIPINE BESYLATE 5 MG/1
5 TABLET ORAL DAILY
Qty: 30 TABLET | Refills: 5 | Status: SHIPPED | OUTPATIENT
Start: 2019-09-06 | End: 2020-04-08 | Stop reason: SDUPTHER

## 2019-09-06 NOTE — PROGRESS NOTES
Subjective   Antonietta Gramajo is a 79 y.o. female.   Chief Complaint   Patient presents with   • Osteoarthritis   • Asthma   • Hypertension       History of Present Illness     #1hypertension-on losartan/HCTZ 100/25 mg a day and amlodipine 5 mg -she still takes it every other day. Patient denies shortness of breath, dizziness, palpitations. Normal kidney tests and electrolytes.  She does not exercise regularly.   She saw Dr. Page who recommended stress echo, but patient was busy in summer and did not have a chance to schedule it yet.      #2osteoarthritis- shoulders hands and knees. Pain is achy, it comes and goes.  She is not pain-free, but it is better with nabumetone. Patient is using it twice a day. If she forgets to take it she gets worsening of pain. It helps her to function. She had seen no blood in stool, no black stools.     #3 Asthma-patient is still off singular.  She reports no problems with asthma.  She uses albuterol as needed on average 3-4 times a month.  It helps when she uses it.  She had Prevnar 13 in 9/2017.  Pneumovax in 2007.    She has diabetes.  She follows up with Dr. Holley.  Last A1c was at 8.1.    The following portions of the patient's history were reviewed and updated as appropriate: allergies, current medications, past family history, past medical history, past social history, past surgical history and problem list.    Review of Systems   Constitutional: Negative.    Respiratory: Negative.    Cardiovascular: Negative.          Objective   Wt Readings from Last 3 Encounters:   09/06/19 67.1 kg (148 lb)   05/24/19 68.5 kg (151 lb)   03/26/19 68.9 kg (152 lb)      Vitals:    09/06/19 1141   BP: 146/80   Pulse: 83   Temp: 98.8 °F (37.1 °C)   SpO2: 99%     Temp Readings from Last 3 Encounters:   09/06/19 98.8 °F (37.1 °C)   03/05/19 98.2 °F (36.8 °C)   09/11/18 98 °F (36.7 °C)     BP Readings from Last 3 Encounters:   09/06/19 146/80   05/24/19 130/68   03/26/19 130/80     Pulse Readings from  Last 3 Encounters:   09/06/19 83   03/26/19 100   03/05/19 87       Physical Exam   Constitutional: She is oriented to person, place, and time. She appears well-developed and well-nourished.   HENT:   Head: Normocephalic and atraumatic.   Neck: Neck supple. Carotid bruit is not present. No thyromegaly present.   Cardiovascular: Normal rate and regular rhythm.   Murmur heard.  Pulmonary/Chest: Effort normal and breath sounds normal.   Neurological: She is alert and oriented to person, place, and time.   Skin: Skin is warm, dry and intact.   Psychiatric: She has a normal mood and affect. Her behavior is normal.       Assessment/Plan   Antonietta was seen today for osteoarthritis, asthma and hypertension.    Diagnoses and all orders for this visit:    Essential hypertension    Osteoarthritis of multiple joints, unspecified osteoarthritis type    Mild intermittent asthma without complication    Other orders  -     losartan-hydrochlorothiazide (HYZAAR) 100-25 MG per tablet; Take 1 tablet by mouth Daily.  -     nabumetone (RELAFEN) 750 MG tablet; Take 1 tablet by mouth 2 (Two) Times a Day As Needed for Mild Pain .  -     amLODIPine (NORVASC) 5 MG tablet; Take 1 tablet by mouth Daily.        #1 hypertension- blood pressure is mildly elevated today, but patient was rushing to our office  and was stressed about being late.  She will monitor blood pressure outside of the office.  We are not changing medications at this time.  Follow-up in 6 months.    2.  Osteoarthritis-continue current treatment.  Side effects of GI bleeding, kidney failure and cardiovascular risks reviewed.  Patient is instructed to watch for blood in stool and black stools.  Follow-up in 6 months.    3.  Asthma-controlled.  Continue same.  Follow-up in 6 months.

## 2019-12-06 LAB
25(OH)D3+25(OH)D2 SERPL-MCNC: 27.8 NG/ML (ref 30–100)
ALBUMIN SERPL-MCNC: 4.6 G/DL (ref 3.5–5.2)
ALBUMIN/GLOB SERPL: 1.8 G/DL
ALP SERPL-CCNC: 116 U/L (ref 39–117)
ALT SERPL-CCNC: 14 U/L (ref 1–33)
AST SERPL-CCNC: 15 U/L (ref 1–32)
BILIRUB SERPL-MCNC: 0.4 MG/DL (ref 0.2–1.2)
BUN SERPL-MCNC: 17 MG/DL (ref 8–23)
BUN/CREAT SERPL: 20.2 (ref 7–25)
C PEPTIDE SERPL-MCNC: 1 NG/ML (ref 1.1–4.4)
CALCIUM SERPL-MCNC: 9.8 MG/DL (ref 8.6–10.5)
CHLORIDE SERPL-SCNC: 100 MMOL/L (ref 98–107)
CHOLEST SERPL-MCNC: 178 MG/DL (ref 0–200)
CO2 SERPL-SCNC: 28.4 MMOL/L (ref 22–29)
CREAT SERPL-MCNC: 0.84 MG/DL (ref 0.57–1)
GLOBULIN SER CALC-MCNC: 2.6 GM/DL
GLUCOSE SERPL-MCNC: 161 MG/DL (ref 65–99)
HBA1C MFR BLD: 8.9 % (ref 4.8–5.6)
HDLC SERPL-MCNC: 59 MG/DL (ref 40–60)
INTERPRETATION: NORMAL
LDLC SERPL CALC-MCNC: 78 MG/DL (ref 0–100)
Lab: NORMAL
MICROALBUMIN UR-MCNC: 12.8 UG/ML
POTASSIUM SERPL-SCNC: 3.8 MMOL/L (ref 3.5–5.2)
PROT SERPL-MCNC: 7.2 G/DL (ref 6–8.5)
SODIUM SERPL-SCNC: 141 MMOL/L (ref 136–145)
T4 SERPL-MCNC: 9.65 MCG/DL (ref 4.5–11.7)
TRIGL SERPL-MCNC: 207 MG/DL (ref 0–150)
TSH SERPL DL<=0.005 MIU/L-ACNC: 2.75 UIU/ML (ref 0.27–4.2)
URATE SERPL-MCNC: 4.6 MG/DL (ref 2.4–5.7)
VLDLC SERPL CALC-MCNC: 41.4 MG/DL

## 2020-02-24 ENCOUNTER — OFFICE VISIT (OUTPATIENT)
Dept: ENDOCRINOLOGY | Age: 80
End: 2020-02-24

## 2020-02-24 VITALS
HEIGHT: 59 IN | WEIGHT: 147 LBS | DIASTOLIC BLOOD PRESSURE: 80 MMHG | BODY MASS INDEX: 29.64 KG/M2 | HEART RATE: 103 BPM | OXYGEN SATURATION: 99 % | SYSTOLIC BLOOD PRESSURE: 140 MMHG

## 2020-02-24 DIAGNOSIS — E78.2 MIXED HYPERLIPIDEMIA: ICD-10-CM

## 2020-02-24 DIAGNOSIS — Z79.4 LONG-TERM INSULIN USE (HCC): ICD-10-CM

## 2020-02-24 DIAGNOSIS — IMO0002 UNCONTROLLED TYPE 2 DIABETES MELLITUS WITH COMPLICATION, WITHOUT LONG-TERM CURRENT USE OF INSULIN: Primary | ICD-10-CM

## 2020-02-24 DIAGNOSIS — E55.9 VITAMIN D DEFICIENCY: ICD-10-CM

## 2020-02-24 DIAGNOSIS — I10 ESSENTIAL HYPERTENSION: ICD-10-CM

## 2020-02-24 PROCEDURE — 99214 OFFICE O/P EST MOD 30 MIN: CPT | Performed by: INTERNAL MEDICINE

## 2020-02-24 RX ORDER — LINAGLIPTIN AND METFORMIN HYDROCHLORIDE 2.5; 1 MG/1; MG/1
2 TABLET, FILM COATED, EXTENDED RELEASE ORAL DAILY
Qty: 60 TABLET | Refills: 11 | Status: SHIPPED | OUTPATIENT
Start: 2020-02-24 | End: 2020-04-08 | Stop reason: SDUPTHER

## 2020-02-24 RX ORDER — BROMOCRIPTINE MESYLATE 0.8 MG/1
6 TABLET ORAL
Qty: 180 TABLET | Refills: 5 | Status: SHIPPED | OUTPATIENT
Start: 2020-02-24 | End: 2020-04-08 | Stop reason: SDUPTHER

## 2020-02-24 RX ORDER — LOSARTAN POTASSIUM AND HYDROCHLOROTHIAZIDE 25; 100 MG/1; MG/1
1 TABLET ORAL DAILY
Qty: 30 TABLET | Refills: 5 | Status: SHIPPED | OUTPATIENT
Start: 2020-02-24 | End: 2020-04-08 | Stop reason: SDUPTHER

## 2020-02-24 RX ORDER — INSULIN GLARGINE 300 U/ML
50 INJECTION, SOLUTION SUBCUTANEOUS
Qty: 2 PEN | Refills: 11 | Status: SHIPPED | OUTPATIENT
Start: 2020-02-24 | End: 2020-04-08 | Stop reason: SDUPTHER

## 2020-02-24 NOTE — PROGRESS NOTES
Subjective   Antonietta Gramajo is a 79 y.o. female  Follow up type 2 diabetes mellitus hyperlipidemia HTN  here to discuss recent labs check B/S once daily did not bring meter like to discuss medication not working  History of Present Illness this is a 79-year-old female known patient with type 2 diabetes hypertension and dyslipidemia as well as vitamin D deficiency.  Over the course of last 6 months she has had no significant health problems for which to go to the ER or hospital.  She is however complaining of occasional shortness of air and palpitation.  She mentioned that her leg is swelling and she knows that it is because of the fact that she is taking amlodipine.    The following portions of the patient's history were reviewed and updated as appropriate: allergies, current medications, past family history, past medical history, past social history, past surgical history and problem list.    Review of Systems   Constitutional: Negative for appetite change and fatigue.   Eyes: Negative for visual disturbance.   Respiratory: Positive for shortness of breath. Negative for cough.    Cardiovascular: Positive for palpitations and leg swelling.   Gastrointestinal: Negative for constipation and diarrhea.   Endocrine: Negative for cold intolerance, heat intolerance, polydipsia, polyphagia and polyuria.   Genitourinary: Negative for frequency.   Neurological: Positive for numbness.   The above reviewed were reviewed, corroborated and accepted.    Objective   Physical Exam   Constitutional: She is oriented to person, place, and time. She appears well-developed and well-nourished. No distress.   HENT:   Head: Normocephalic and atraumatic.   Right Ear: External ear normal.   Left Ear: External ear normal.   Nose: Nose normal.   Mouth/Throat: Oropharynx is clear and moist. No oropharyngeal exudate.   Eyes: Pupils are equal, round, and reactive to light. Conjunctivae and EOM are normal. Right eye exhibits no discharge. Left eye  exhibits no discharge. No scleral icterus.   Neck: Normal range of motion. Neck supple. No JVD present. No tracheal deviation present. No thyromegaly present.   Cardiovascular: Normal rate, regular rhythm, normal heart sounds and intact distal pulses. Exam reveals no gallop and no friction rub.   No murmur heard.  Pulmonary/Chest: Effort normal and breath sounds normal. No stridor. No respiratory distress. She has no wheezes. She has no rales. She exhibits no tenderness.   Abdominal: Soft. Bowel sounds are normal. She exhibits no distension and no mass. There is no tenderness. There is no rebound and no guarding. No hernia.   Musculoskeletal: Normal range of motion. She exhibits no edema, tenderness or deformity.   Lymphadenopathy:     She has no cervical adenopathy.   Neurological: She is alert and oriented to person, place, and time. She has normal reflexes. She displays normal reflexes. No cranial nerve deficit or sensory deficit. She exhibits normal muscle tone. Coordination normal.   Skin: Skin is warm and dry. No rash noted. She is not diaphoretic. No erythema. No pallor.   Psychiatric: She has a normal mood and affect. Her behavior is normal. Judgment and thought content normal.   Vitals reviewed.  No significant change since 1/7/2019 office visit.  Results for orders placed or performed in visit on 05/10/19   Hemoglobin A1c   Result Value Ref Range    Hemoglobin A1C 8.10 (H) 4.80 - 5.60 %   Vitamin D 25 Hydroxy   Result Value Ref Range    25 Hydroxy, Vitamin D 31.0 30.0 - 100.0 ng/ml   Lipid Panel   Result Value Ref Range    Total Cholesterol 196 0 - 200 mg/dL    Triglycerides 269 (H) 0 - 150 mg/dL    HDL Cholesterol 66 (H) 40 - 60 mg/dL    VLDL Cholesterol 53.8 mg/dL    LDL Cholesterol  76 0 - 100 mg/dL   Comprehensive Metabolic Panel   Result Value Ref Range    Glucose 159 (H) 65 - 99 mg/dL    BUN 15 8 - 23 mg/dL    Creatinine 0.78 0.57 - 1.00 mg/dL    eGFR Non African Am 71 >60 mL/min/1.73    eGFR   Am 87 >60 mL/min/1.73    BUN/Creatinine Ratio 19.2 7.0 - 25.0    Sodium 142 136 - 145 mmol/L    Potassium 3.6 3.5 - 5.2 mmol/L    Chloride 99 98 - 107 mmol/L    Total CO2 29.2 (H) 22.0 - 29.0 mmol/L    Calcium 10.3 8.6 - 10.5 mg/dL    Total Protein 7.1 6.0 - 8.5 g/dL    Albumin 4.90 3.50 - 5.20 g/dL    Globulin 2.2 gm/dL    A/G Ratio 2.2 g/dL    Total Bilirubin 0.4 0.2 - 1.2 mg/dL    Alkaline Phosphatase 106 39 - 117 U/L    AST (SGOT) 16 1 - 32 U/L    ALT (SGPT) 20 1 - 33 U/L   C-Peptide   Result Value Ref Range    C-Peptide 1.8 1.1 - 4.4 ng/mL   Cardiovascular Risk Assessment   Result Value Ref Range    Interpretation Note    Diabetes Patient Education   Result Value Ref Range    PDF Image Not applicable            Assessment/Plan   Antonietta was seen today for diabetes.    Diagnoses and all orders for this visit:    Uncontrolled type 2 diabetes mellitus with complication, without long-term current use of insulin (CMS/[facility])  -     T4 & TSH ([facility]); Future  -     Uric Acid; Future  -     Vitamin D 25 Hydroxy; Future  -     Comprehensive Metabolic Panel; Future  -     C-Peptide; Future  -     Hemoglobin A1c; Future  -     Lipid Panel; Future  -     MicroAlbumin, Urine, Random - Urine, Clean Catch; Future    Essential hypertension  -     T4 & TSH ([facility]); Future  -     Uric Acid; Future  -     Vitamin D 25 Hydroxy; Future  -     Comprehensive Metabolic Panel; Future  -     C-Peptide; Future  -     Hemoglobin A1c; Future  -     Lipid Panel; Future  -     MicroAlbumin, Urine, Random - Urine, Clean Catch; Future    Mixed hyperlipidemia  -     T4 & TSH ([facility]); Future  -     Uric Acid; Future  -     Vitamin D 25 Hydroxy; Future  -     Comprehensive Metabolic Panel; Future  -     C-Peptide; Future  -     Hemoglobin A1c; Future  -     Lipid Panel; Future  -     MicroAlbumin, Urine, Random - Urine, Clean Catch; Future    Vitamin D deficiency  -     T4 & TSH ([facility]); Future  -     Uric Acid; Future  -     Vitamin D 25  "Hydroxy; Future  -     Comprehensive Metabolic Panel; Future  -     C-Peptide; Future  -     Hemoglobin A1c; Future  -     Lipid Panel; Future  -     MicroAlbumin, Urine, Random - Urine, Clean Catch; Future    Long-term insulin use (CMS/HCC)  -     T4 & TSH ([facility]); Future  -     Uric Acid; Future  -     Vitamin D 25 Hydroxy; Future  -     Comprehensive Metabolic Panel; Future  -     C-Peptide; Future  -     Hemoglobin A1c; Future  -     Lipid Panel; Future  -     MicroAlbumin, Urine, Random - Urine, Clean Catch; Future    Other orders  -     TOUJEO MAX SOLOSTAR 300 UNIT/ML solution pen-injector injection; Inject 50 Units under the skin into the appropriate area as directed Daily With Breakfast.  -     CYCLOSET 0.8 MG tablet; Take 6 tablets by mouth Daily With Breakfast.  -     JENTADUETO XR 2.5-1000 MG tablet sustained-release 24 hour; Take 2 tablets by mouth Daily.  -     losartan-hydrochlorothiazide (HYZAAR) 100-25 MG per tablet; Take 1 tablet by mouth Daily.      /80   Pulse 103   Ht 149.9 cm (59\")   Wt 66.7 kg (147 lb)   SpO2 99%   BMI 29.69 kg/m²   Allergies   Allergen Reactions   • Atorvastatin Other (See Comments)   • Invokana [Canagliflozin]      rash   • Oxycodone-Acetaminophen    • Propoxyphene-Acetaminophen    • Sulfa Antibiotics    • Victoza [Liraglutide] Rash     Current Outpatient Medications:   •  albuterol (PROAIR HFA) 108 (90 Base) MCG/ACT inhaler, Inhale 1 puff Every 4 (Four) Hours As Needed for Wheezing., Disp: 1 inhaler, Rfl: 1  •  amLODIPine (NORVASC) 5 MG tablet, Take 1 tablet by mouth Daily., Disp: 30 tablet, Rfl: 5  •  aspirin 81 MG EC tablet, Take 81 mg by mouth Daily., Disp: , Rfl:   •  B-D UF III MINI PEN NEEDLES 31G X 5 MM misc, USE ONE PER DAY, Disp: 100 each, Rfl: 3  •  cyclobenzaprine (FLEXERIL) 10 MG tablet, Take 10 mg by mouth 3 (Three) Times a Day As Needed for Muscle Spasms., Disp: , Rfl:   •  CYCLOSET 0.8 MG tablet, Take 6 tablets by mouth Daily With Breakfast., " Disp: 180 tablet, Rfl: 5  •  glucose blood test strip, Check blood glucose 3-4 times dialy, Disp: 100 each, Rfl: 4  •  Insulin Glargine 300 UNIT/ML solution pen-injector, Inject 15 Units under the skin into the appropriate area as directed Daily., Disp: 3 pen, Rfl: 5  •  JENTADUETO XR 2.5-1000 MG tablet sustained-release 24 hour, TAKE TWO TABLETS BY MOUTH DAILY, Disp: 60 tablet, Rfl: 4  •  latanoprost (XALATAN) 0.005 % ophthalmic solution, , Disp: , Rfl:   •  losartan-hydrochlorothiazide (HYZAAR) 100-25 MG per tablet, Take 1 tablet by mouth Daily., Disp: 30 tablet, Rfl: 5  •  nabumetone (RELAFEN) 750 MG tablet, Take 1 tablet by mouth 2 (Two) Times a Day As Needed for Mild Pain ., Disp: 60 tablet, Rfl: 5  •  Omega-3 Fatty Acids (FISH OIL) 1000 MG capsule capsule, Take 2 capsules by mouth 2 (Two) Times a Day With Meals., Disp: 240 capsule, Rfl: 4  •  Omeprazole ([facility] OMEPRAZOLE) 20 MG tablet delayed-release, Take  by mouth., Disp: , Rfl:   •  ONETOUCH DELICA LANCETS 33G misc, Check blood glucose 3 times daily DX CODE: e11.65, Disp: 100 each, Rfl: 5  •  Triamcinolone Acetonide (NASACORT) 55 MCG/ACT nasal inhaler, 2 sprays into each nostril daily., Disp: , Rfl:   In summary I saw and examined this 79-year-old female for above-mentioned problems.  I reviewed her laboratory evaluations of 12/5/2019 and provided her with a hard copy of it.  Overall her blood chemistry is in an excellent range as well as her lipid panel however her hemoglobin A1c is 8.90 and her vitamin D is 27.8.  I gave her a sliding scale on her Toujeo and asked her to increase the dose by 2 units from the current dose of 20 units, every 3 days if her fasting blood glucose is greater than 120 up to 50 units.  Because she cannot take prescription strength vitamin D I asked her to start herself on 5000 units OTC.  She will see Ms. Nolvia Marin in 4 months or sooner if needed with laboratory evaluation prior to each office visit.

## 2020-03-17 DIAGNOSIS — Z11.59 NEED FOR HEPATITIS C SCREENING TEST: Primary | ICD-10-CM

## 2020-03-19 LAB — HCV AB S/CO SERPL IA: <0.1 S/CO RATIO (ref 0–0.9)

## 2020-04-08 RX ORDER — INSULIN GLARGINE 300 U/ML
50 INJECTION, SOLUTION SUBCUTANEOUS
Qty: 6 PEN | Refills: 3 | Status: SHIPPED | OUTPATIENT
Start: 2020-04-08 | End: 2020-07-14

## 2020-04-08 RX ORDER — LINAGLIPTIN AND METFORMIN HYDROCHLORIDE 2.5; 1 MG/1; MG/1
2 TABLET, FILM COATED, EXTENDED RELEASE ORAL DAILY
Qty: 180 TABLET | Refills: 3 | Status: SHIPPED | OUTPATIENT
Start: 2020-04-08 | End: 2020-07-14

## 2020-04-08 RX ORDER — AMLODIPINE BESYLATE 5 MG/1
5 TABLET ORAL DAILY
Qty: 90 TABLET | Refills: 3 | Status: SHIPPED | OUTPATIENT
Start: 2020-04-08 | End: 2020-07-23 | Stop reason: HOSPADM

## 2020-04-08 RX ORDER — BROMOCRIPTINE MESYLATE 0.8 MG/1
6 TABLET ORAL
Qty: 540 TABLET | Refills: 3 | Status: SHIPPED | OUTPATIENT
Start: 2020-04-08 | End: 2020-07-14

## 2020-04-08 RX ORDER — LOSARTAN POTASSIUM AND HYDROCHLOROTHIAZIDE 25; 100 MG/1; MG/1
1 TABLET ORAL DAILY
Qty: 90 TABLET | Refills: 3 | Status: SHIPPED | OUTPATIENT
Start: 2020-04-08 | End: 2020-07-23 | Stop reason: HOSPADM

## 2020-06-17 DIAGNOSIS — IMO0002 UNCONTROLLED TYPE 2 DIABETES MELLITUS WITH COMPLICATION, WITHOUT LONG-TERM CURRENT USE OF INSULIN: ICD-10-CM

## 2020-06-24 ENCOUNTER — RESULTS ENCOUNTER (OUTPATIENT)
Dept: ENDOCRINOLOGY | Age: 80
End: 2020-06-24

## 2020-06-24 DIAGNOSIS — I10 ESSENTIAL HYPERTENSION: ICD-10-CM

## 2020-06-24 DIAGNOSIS — E55.9 VITAMIN D DEFICIENCY: ICD-10-CM

## 2020-06-24 DIAGNOSIS — E78.2 MIXED HYPERLIPIDEMIA: ICD-10-CM

## 2020-06-24 DIAGNOSIS — IMO0002 UNCONTROLLED TYPE 2 DIABETES MELLITUS WITH COMPLICATION, WITHOUT LONG-TERM CURRENT USE OF INSULIN: ICD-10-CM

## 2020-06-24 DIAGNOSIS — Z79.4 LONG-TERM INSULIN USE (HCC): ICD-10-CM

## 2020-07-08 LAB
25(OH)D3+25(OH)D2 SERPL-MCNC: 22.8 NG/ML (ref 30–100)
ALBUMIN SERPL-MCNC: 4.3 G/DL (ref 3.5–5.2)
ALBUMIN/GLOB SERPL: 1.8 G/DL
ALP SERPL-CCNC: 97 U/L (ref 39–117)
ALT SERPL-CCNC: 15 U/L (ref 1–33)
AST SERPL-CCNC: 10 U/L (ref 1–32)
BILIRUB SERPL-MCNC: 0.3 MG/DL (ref 0–1.2)
BUN SERPL-MCNC: 15 MG/DL (ref 8–23)
BUN/CREAT SERPL: 18.3 (ref 7–25)
C PEPTIDE SERPL-MCNC: 1.8 NG/ML (ref 1.1–4.4)
CALCIUM SERPL-MCNC: 9.5 MG/DL (ref 8.6–10.5)
CHLORIDE SERPL-SCNC: 101 MMOL/L (ref 98–107)
CHOLEST SERPL-MCNC: 182 MG/DL (ref 0–200)
CO2 SERPL-SCNC: 27.7 MMOL/L (ref 22–29)
CREAT SERPL-MCNC: 0.82 MG/DL (ref 0.57–1)
GLOBULIN SER CALC-MCNC: 2.4 GM/DL
GLUCOSE SERPL-MCNC: 191 MG/DL (ref 65–99)
HBA1C MFR BLD: 8.8 % (ref 4.8–5.6)
HDLC SERPL-MCNC: 56 MG/DL (ref 40–60)
INTERPRETATION: NORMAL
LDLC SERPL CALC-MCNC: 83 MG/DL (ref 0–100)
Lab: NORMAL
MICROALBUMIN UR-MCNC: 27.7 UG/ML
POTASSIUM SERPL-SCNC: 3.5 MMOL/L (ref 3.5–5.2)
PROT SERPL-MCNC: 6.7 G/DL (ref 6–8.5)
SODIUM SERPL-SCNC: 140 MMOL/L (ref 136–145)
T4 SERPL-MCNC: 7.89 MCG/DL (ref 4.5–11.7)
TRIGL SERPL-MCNC: 216 MG/DL (ref 0–150)
TSH SERPL DL<=0.005 MIU/L-ACNC: 1.2 UIU/ML (ref 0.27–4.2)
URATE SERPL-MCNC: 4.8 MG/DL (ref 2.4–5.7)
VLDLC SERPL CALC-MCNC: 43.2 MG/DL

## 2020-07-14 ENCOUNTER — OFFICE VISIT (OUTPATIENT)
Dept: ENDOCRINOLOGY | Age: 80
End: 2020-07-14

## 2020-07-14 VITALS
SYSTOLIC BLOOD PRESSURE: 138 MMHG | DIASTOLIC BLOOD PRESSURE: 82 MMHG | BODY MASS INDEX: 29.56 KG/M2 | RESPIRATION RATE: 16 BRPM | WEIGHT: 146.6 LBS | HEIGHT: 59 IN

## 2020-07-14 DIAGNOSIS — E55.9 VITAMIN D DEFICIENCY: ICD-10-CM

## 2020-07-14 DIAGNOSIS — E11.43 DIABETIC AUTONOMIC NEUROPATHY ASSOCIATED WITH TYPE 2 DIABETES MELLITUS (HCC): ICD-10-CM

## 2020-07-14 DIAGNOSIS — I10 ESSENTIAL HYPERTENSION: ICD-10-CM

## 2020-07-14 DIAGNOSIS — E78.2 MIXED HYPERLIPIDEMIA: ICD-10-CM

## 2020-07-14 DIAGNOSIS — IMO0002 UNCONTROLLED TYPE 2 DIABETES MELLITUS WITH COMPLICATION, WITHOUT LONG-TERM CURRENT USE OF INSULIN: Primary | ICD-10-CM

## 2020-07-14 PROCEDURE — 99214 OFFICE O/P EST MOD 30 MIN: CPT | Performed by: NURSE PRACTITIONER

## 2020-07-14 RX ORDER — SITAGLIPTIN 100 MG/1
100 TABLET, FILM COATED ORAL DAILY
Qty: 30 TABLET | Refills: 5 | Status: SHIPPED | OUTPATIENT
Start: 2020-07-14 | End: 2021-01-14 | Stop reason: SDUPTHER

## 2020-07-14 RX ORDER — INSULIN DEGLUDEC 200 U/ML
24 INJECTION, SOLUTION SUBCUTANEOUS DAILY
Qty: 15 ML | Refills: 5 | Status: SHIPPED | OUTPATIENT
Start: 2020-07-14 | End: 2021-01-14 | Stop reason: SDUPTHER

## 2020-07-14 NOTE — PATIENT INSTRUCTIONS
"Stop toujeo due to preference of patient  Start tresiba 24 units once daily and increase by 2 units every 3 days if morning blood sugars are greater than 120 mg/dl and continue to increase every 3 day until you are consistently greater than 120 mg/dl    Bring meter each visit for review  Stop cycloset due to complaints of \"sores in mouth\"  Stop jentaduento due to cost and start januvia 100 mg once daily  Start metformin 1000 mg 1 pill twice daily with food  If blood sugars are staying greater than 150 mg/dl please contact office with results    Follow up with Pcp for fibromyalgia symptoms and cardiologist for heart eval.  Take daily otc vitamin d due to cost of prescription   Check bs's twice daily and in morning fasting     Edema    Edema is an abnormal buildup of fluids in the body tissues and under the skin. Swelling of the legs, feet, and ankles is a common symptom that becomes more likely as you get older. Swelling is also common in looser tissues, like around the eyes. When the affected area is squeezed, the fluid may move out of that spot and leave a dent for a few moments. This dent is called pitting edema.  There are many possible causes of edema. Eating too much salt (sodium) and being on your feet or sitting for a long time can cause edema in your legs, feet, and ankles. Hot weather may make edema worse. Common causes of edema include:  · Heart failure.  · Liver or kidney disease.  · Weak leg blood vessels.  · Cancer.  · An injury.  · Pregnancy.  · Medicines.  · Being obese.  · Low protein levels in the blood.  Edema is usually painless. Your skin may look swollen or shiny.  Follow these instructions at home:  · Keep the affected body part raised (elevated) above the level of your heart when you are sitting or lying down.  · Do not sit still or stand for long periods of time.  · Do not wear tight clothing. Do not wear garters on your upper legs.  · Exercise your legs to get your circulation going. This " helps to move the fluid back into your blood vessels, and it may help the swelling go down.  · Wear elastic bandages or support stockings to reduce swelling as told by your health care provider.  · Eat a low-salt (low-sodium) diet to reduce fluid as told by your health care provider.  · Depending on the cause of your swelling, you may need to limit how much fluid you drink (fluid restriction).  · Take over-the-counter and prescription medicines only as told by your health care provider.  Contact a health care provider if:  · Your edema does not get better with treatment.  · You have heart, liver, or kidney disease and have symptoms of edema.  · You have sudden and unexplained weight gain.  Get help right away if:  · You develop shortness of breath or chest pain.  · You cannot breathe when you lie down.  · You develop pain, redness, or warmth in the swollen areas.  · You have heart, liver, or kidney disease and suddenly get edema.  · You have a fever and your symptoms suddenly get worse.  Summary  · Edema is an abnormal buildup of fluids in the body tissues and under the skin.  · Eating too much salt (sodium) and being on your feet or sitting for a long time can cause edema in your legs, feet, and ankles.  · Keep the affected body part raised (elevated) above the level of your heart when you are sitting or lying down.  This information is not intended to replace advice given to you by your health care provider. Make sure you discuss any questions you have with your health care provider.  Document Released: 12/18/2006 Document Revised: 12/21/2018 Document Reviewed: 01/20/2018  [employer]vier Patient Education © 2020 [employer]

## 2020-07-14 NOTE — PROGRESS NOTES
"Subjective   Antonietta Gramajo is a 80 y.o. female is here today for follow-up.  Chief Complaint   Patient presents with   • Diabetes     lab review; denies any problems or concerns; last eye exam 03/2020; checking BG once a day; no BG readings    • Hypertension     /82   Resp 16   Ht 149.9 cm (59\")   Wt 66.5 kg (146 lb 9.6 oz)   BMI 29.61 kg/m²   Current Outpatient Medications on File Prior to Visit   Medication Sig   • albuterol (PROAIR HFA) 108 (90 Base) MCG/ACT inhaler Inhale 1 puff Every 4 (Four) Hours As Needed for Wheezing.   • amLODIPine (NORVASC) 5 MG tablet Take 1 tablet by mouth Daily.   • aspirin 81 MG EC tablet Take 81 mg by mouth Daily.   • cyclobenzaprine (FLEXERIL) 10 MG tablet Take 10 mg by mouth 3 (Three) Times a Day As Needed for Muscle Spasms.   • CYCLOSET 0.8 MG tablet Take 6 tablets by mouth Daily With Breakfast.   • glucose blood test strip Check blood glucose 3-4 times dialy   • Insulin Pen Needle (B-D UF III MINI PEN NEEDLES) 31G X 5 MM misc Use once daily   • JENTADUETO XR 2.5-1000 MG tablet sustained-release 24 hour Take 2 tablets by mouth Daily.   • latanoprost (XALATAN) 0.005 % ophthalmic solution    • losartan-hydrochlorothiazide (HYZAAR) 100-25 MG per tablet Take 1 tablet by mouth Daily.   • nabumetone (RELAFEN) 750 MG tablet Take 1 tablet by mouth 2 (Two) Times a Day As Needed for Mild Pain .   • Omega-3 Fatty Acids (FISH OIL) 1000 MG capsule capsule Take 2 capsules by mouth 2 (Two) Times a Day With Meals.   • Omeprazole ([employer] OMEPRAZOLE) 20 MG tablet delayed-release Take  by mouth.   • ONETOUCH DELICA LANCETS 33G misc Check blood glucose 3 times daily DX CODE: e11.65   • TOUJEO MAX SOLOSTAR 300 UNIT/ML solution pen-injector injection Inject 50 Units under the skin into the appropriate area as directed Daily With Breakfast.   • Triamcinolone Acetonide (NASACORT) 55 MCG/ACT nasal inhaler 2 sprays into each nostril daily.     No current facility-administered medications on file " prior to visit.      Family History   Problem Relation Age of Onset   • Stroke Mother    • Diabetes Mother    • Heart attack Father    • Breast cancer Sister    • Hypertension Sister    • Lung cancer Brother    • Hypertension Brother    • Heart attack Brother      Social History     Tobacco Use   • Smoking status: Never Smoker   • Smokeless tobacco: Never Used   Substance Use Topics   • Alcohol use: No   • Drug use: No     Allergies   Allergen Reactions   • Atorvastatin Other (See Comments)   • Invokana [Canagliflozin]      rash   • Oxycodone-Acetaminophen    • Propoxyphene-Acetaminophen    • Sulfa Antibiotics    • Victoza [Liraglutide] Rash         History of Present Illness   Encounter Diagnoses   Name Primary?   • Essential hypertension    • Mixed hyperlipidemia    • Vitamin D deficiency    • Uncontrolled type 2 diabetes mellitus with complication, without long-term current use of insulin (CMS/[facility]) Yes   • Diabetic autonomic neuropathy associated with type 2 diabetes mellitus (CMS/[facility])      This is an 80-year-old female patient here today for a follow-up visit.  She has been seen for the above diagnoses.  She was last seen by Dr. Holley and she was advised to start insulin and to titrate based on morning blood sugars being greater than 120.  She was started at 20 units and has only gone up to 22 units.  Her A1c is 8.8.  She denies any hypoglycemic events.  She did not bring her blood glucose meter with her today's visit.  She is up-to-date on eye exam.  She complains of aches and pains all over that is possibly due to arthritis and/or fibromyalgia.  She has been advised to follow-up with her primary care provider.  She also will report that she has 5 pounds of weight gain sometimes in a days.  She does have edema in her lower extremities.  She does see a cardiologist but denies history of congestive heart failure.  She is on amlodipine which can be contributing to the swelling.  She wants to quit taking her  Cycloset.  She states it was causing her too much fatigue and it was causing her to have sores in her mouth.  She also wants to get off Jentadueto due to cost and instead wants to get on Januvia and metformin separately.  She states her  has the same insurance and his is more affordable with the 2 different prescriptions.  She is taking over-the-counter vitamin D because she cannot afford prescription strength.    The following portions of the patient's history were reviewed and updated as appropriate: allergies, current medications, past family history, past medical history, past social history, past surgical history and problem list.    Review of Systems   Constitutional: Negative for fatigue and fever.   Respiratory: Negative for cough and shortness of breath.    Cardiovascular: Negative for chest pain.   Endocrine: Negative for cold intolerance and heat intolerance.   Psychiatric/Behavioral: Negative for sleep disturbance.       Objective   Physical Exam   Constitutional: She is oriented to person, place, and time. She appears well-developed and well-nourished. No distress.   HENT:   Head: Normocephalic and atraumatic.   Right Ear: External ear normal.   Left Ear: External ear normal.   Nose: Nose normal.   Mouth/Throat: Oropharynx is clear and moist. No oropharyngeal exudate.   Eyes: Pupils are equal, round, and reactive to light. EOM are normal. Right eye exhibits no discharge. Left eye exhibits no discharge.   Neck: Trachea normal, normal range of motion and full passive range of motion without pain. Neck supple. No tracheal tenderness present. Carotid bruit is not present. No tracheal deviation, no edema and no erythema present. No thyroid mass and no thyromegaly present.   Cardiovascular: Normal rate, regular rhythm, normal heart sounds and intact distal pulses. Exam reveals no gallop and no friction rub.   No murmur heard.  Pulmonary/Chest: Effort normal and breath sounds normal. No stridor. No  respiratory distress. She has no wheezes. She has no rales.   Abdominal: Soft. Bowel sounds are normal. She exhibits no distension.   Musculoskeletal: Normal range of motion. She exhibits edema. She exhibits no deformity.   1+ to bilateral legs and ankles   Lymphadenopathy:     She has no cervical adenopathy.   Neurological: She is alert and oriented to person, place, and time.   Skin: Skin is warm and dry. No rash noted. She is not diaphoretic. No erythema. No pallor.   Psychiatric: She has a normal mood and affect. Her behavior is normal. Judgment and thought content normal.   Nursing note and vitals reviewed.    Results for orders placed or performed in visit on 07/07/20   Comprehensive Metabolic Panel   Result Value Ref Range    Glucose 191 (H) 65 - 99 mg/dL    BUN 15 8 - 23 mg/dL    Creatinine 0.82 0.57 - 1.00 mg/dL    eGFR Non African Am 67 >60 mL/min/1.73    eGFR African Am 81 >60 mL/min/1.73    BUN/Creatinine Ratio 18.3 7.0 - 25.0    Sodium 140 136 - 145 mmol/L    Potassium 3.5 3.5 - 5.2 mmol/L    Chloride 101 98 - 107 mmol/L    Total CO2 27.7 22.0 - 29.0 mmol/L    Calcium 9.5 8.6 - 10.5 mg/dL    Total Protein 6.7 6.0 - 8.5 g/dL    Albumin 4.30 3.50 - 5.20 g/dL    Globulin 2.4 gm/dL    A/G Ratio 1.8 g/dL    Total Bilirubin 0.3 0.0 - 1.2 mg/dL    Alkaline Phosphatase 97 39 - 117 U/L    AST (SGOT) 10 1 - 32 U/L    ALT (SGPT) 15 1 - 33 U/L   Lipid Panel   Result Value Ref Range    Total Cholesterol 182 0 - 200 mg/dL    Triglycerides 216 (H) 0 - 150 mg/dL    HDL Cholesterol 56 40 - 60 mg/dL    VLDL Cholesterol 43.2 mg/dL    LDL Cholesterol  83 0 - 100 mg/dL   T4 & TSH ([facility])   Result Value Ref Range    TSH 1.200 0.270 - 4.200 uIU/mL    T4, Total 7.89 4.50 - 11.70 mcg/dL   Hemoglobin A1c   Result Value Ref Range    Hemoglobin A1C 8.80 (H) 4.80 - 5.60 %   C-Peptide   Result Value Ref Range    C-Peptide 1.8 1.1 - 4.4 ng/mL   Vitamin D 25 Hydroxy   Result Value Ref Range    25 Hydroxy, Vitamin D 22.8 (L) 30.0 -  100.0 ng/ml   MicroAlbumin, Urine, Random -   Result Value Ref Range    Microalbumin, Urine 27.7 Not Estab. ug/mL   Uric Acid   Result Value Ref Range    Uric Acid 4.8 2.4 - 5.7 mg/dL   Cardiovascular Risk Assessment   Result Value Ref Range    Interpretation Note    Diabetes Patient Education   Result Value Ref Range    PDF Image Not applicable          Assessment/Plan   Problems Addressed this Visit        Cardiovascular and Mediastinum    Essential hypertension    Hyperlipidemia       Digestive    Vitamin D deficiency       Endocrine    Uncontrolled type 2 diabetes mellitus with complication, without long-term current use of insulin (CMS/[facility]) - Primary    Diabetic autonomic neuropathy associated with type 2 diabetes mellitus (CMS/[facility])          Patient was seen and examined.  Her medication list was reviewed and updated.  She is not tolerating Cycloset due to complaints of sores in her mouth.  She has been advised to stop this medication.  She wants to stop Toujeo because she states Tresiba works better with her blood sugars.  She has been advised to increase to 24 units and continue to increase by 2 units every 3 days if her morning blood sugars are greater than 120 mg/dL.  If she has problem with adjusting her insulin she has been advised to contact the office for advice and direction.  She has been advised to bring her meter with her to each visit.  She has been put on Januvia 100 mg and metformin 1000 mg twice daily in place of Jentadueto due to cost.  She is on over-the-counter vitamin D supplement because she cannot afford prescription strength vitamin D.  Metabolically and clinically she presents stable.  Her cholesterol is in satisfactory range and blood pressure is controlled.  She will follow-up in 6 months with Dr. Holley or myself with labs prior.  She has been advised to bring her meter each visit for download and review.  She has been advised to avoid sodium in her diet.  A copy of this note will be  "forwarded to Dr. Holley for review because of medication changes         Stop toujeo due to preference of patient  Start tresiba 24 units once daily and increase by 2 units every 3 days if morning blood sugars are greater than 120 mg/dl and continue to increase every 3 day until you are consistently greater than 120 mg/dl    Bring meter each visit for review  Stop cycloset due to complaints of \"sores in mouth\"  Stop jentaduento due to cost and start januvia 100 mg once daily  Start metformin 1000 mg 1 pill twice daily with food  If blood sugars are staying greater than 150 mg/dl please contact office with results    Follow up with Pcp for fibromyalgia symptoms and cardiologist for heart eval.  Take daily otc vitamin d due to cost of prescription   Check bs's twice daily and in morning fasting     Edema    Edema is an abnormal buildup of fluids in the body tissues and under the skin. Swelling of the legs, feet, and ankles is a common symptom that becomes more likely as you get older. Swelling is also common in looser tissues, like around the eyes. When the affected area is squeezed, the fluid may move out of that spot and leave a dent for a few moments. This dent is called pitting edema.  There are many possible causes of edema. Eating too much salt (sodium) and being on your feet or sitting for a long time can cause edema in your legs, feet, and ankles. Hot weather may make edema worse. Common causes of edema include:  · Heart failure.  · Liver or kidney disease.  · Weak leg blood vessels.  · Cancer.  · An injury.  · Pregnancy.  · Medicines.  · Being obese.  · Low protein levels in the blood.  Edema is usually painless. Your skin may look swollen or shiny.  Follow these instructions at home:  · Keep the affected body part raised (elevated) above the level of your heart when you are sitting or lying down.  · Do not sit still or stand for long periods of time.  · Do not wear tight clothing. Do not wear garters on your " upper legs.  · Exercise your legs to get your circulation going. This helps to move the fluid back into your blood vessels, and it may help the swelling go down.  · Wear elastic bandages or support stockings to reduce swelling as told by your health care provider.  · Eat a low-salt (low-sodium) diet to reduce fluid as told by your health care provider.  · Depending on the cause of your swelling, you may need to limit how much fluid you drink (fluid restriction).  · Take over-the-counter and prescription medicines only as told by your health care provider.  Contact a health care provider if:  · Your edema does not get better with treatment.  · You have heart, liver, or kidney disease and have symptoms of edema.  · You have sudden and unexplained weight gain.  Get help right away if:  · You develop shortness of breath or chest pain.  · You cannot breathe when you lie down.  · You develop pain, redness, or warmth in the swollen areas.  · You have heart, liver, or kidney disease and suddenly get edema.  · You have a fever and your symptoms suddenly get worse.  Summary  · Edema is an abnormal buildup of fluids in the body tissues and under the skin.  · Eating too much salt (sodium) and being on your feet or sitting for a long time can cause edema in your legs, feet, and ankles.  · Keep the affected body part raised (elevated) above the level of your heart when you are sitting or lying down.  This information is not intended to replace advice given to you by your health care provider. Make sure you discuss any questions you have with your health care provider.  Document Released: 12/18/2006 Document Revised: 12/21/2018 Document Reviewed: 01/20/2018  Elsevier Patient Education © 2020 [employer]

## 2020-07-18 ENCOUNTER — HOSPITAL ENCOUNTER (INPATIENT)
Facility: HOSPITAL | Age: 80
LOS: 4 days | Discharge: HOME | End: 2020-07-23
Attending: EMERGENCY MEDICINE | Admitting: HOSPITALIST

## 2020-07-18 ENCOUNTER — APPOINTMENT (OUTPATIENT)
Dept: GENERAL RADIOLOGY | Facility: HOSPITAL | Age: 80
End: 2020-07-18

## 2020-07-18 DIAGNOSIS — R73.9 HYPERGLYCEMIA: ICD-10-CM

## 2020-07-18 DIAGNOSIS — N39.0 ACUTE UTI: Primary | ICD-10-CM

## 2020-07-18 DIAGNOSIS — D61.818 PANCYTOPENIA (HCC): ICD-10-CM

## 2020-07-18 LAB
ALBUMIN SERPL-MCNC: 3.5 G/DL (ref 3.5–5.2)
ALBUMIN/GLOB SERPL: 1.2 G/DL
ALP SERPL-CCNC: 203 U/L (ref 39–117)
ALT SERPL W P-5'-P-CCNC: 76 U/L (ref 1–33)
ANION GAP SERPL CALCULATED.3IONS-SCNC: 11.9 MMOL/L (ref 5–15)
ARTERIAL PATENCY WRIST A: POSITIVE
AST SERPL-CCNC: 130 U/L (ref 1–32)
ATMOSPHERIC PRESS: 752.8 MMHG
B PARAPERT DNA SPEC QL NAA+PROBE: NOT DETECTED
B PERT DNA SPEC QL NAA+PROBE: NOT DETECTED
BACTERIA UR QL AUTO: ABNORMAL /HPF
BASE EXCESS BLDA CALC-SCNC: 0.4 MMOL/L (ref 0–2)
BDY SITE: ABNORMAL
BILIRUB SERPL-MCNC: 0.8 MG/DL (ref 0–1.2)
BILIRUB UR QL STRIP: NEGATIVE
BUN SERPL-MCNC: 21 MG/DL (ref 8–23)
BUN/CREAT SERPL: 15.4 (ref 7–25)
C PNEUM DNA NPH QL NAA+NON-PROBE: NOT DETECTED
CALCIUM SPEC-SCNC: 8.7 MG/DL (ref 8.6–10.5)
CHLORIDE SERPL-SCNC: 91 MMOL/L (ref 98–107)
CLARITY UR: ABNORMAL
CO2 SERPL-SCNC: 24.1 MMOL/L (ref 22–29)
COARSE GRAN CASTS URNS QL MICRO: ABNORMAL /LPF
COLOR UR: YELLOW
CREAT SERPL-MCNC: 1.36 MG/DL (ref 0.57–1)
D-LACTATE SERPL-SCNC: 1.2 MMOL/L (ref 0.5–2)
DEPRECATED RDW RBC AUTO: 42.1 FL (ref 37–54)
ERYTHROCYTE [DISTWIDTH] IN BLOOD BY AUTOMATED COUNT: 13.1 % (ref 12.3–15.4)
FLUAV H1 2009 PAND RNA NPH QL NAA+PROBE: NOT DETECTED
FLUAV H1 HA GENE NPH QL NAA+PROBE: NOT DETECTED
FLUAV H3 RNA NPH QL NAA+PROBE: NOT DETECTED
FLUAV SUBTYP SPEC NAA+PROBE: NOT DETECTED
FLUBV RNA ISLT QL NAA+PROBE: NOT DETECTED
GFR SERPL CREATININE-BSD FRML MDRD: 37 ML/MIN/1.73
GLOBULIN UR ELPH-MCNC: 3 GM/DL
GLUCOSE SERPL-MCNC: 446 MG/DL (ref 65–99)
GLUCOSE UR STRIP-MCNC: ABNORMAL MG/DL
HADV DNA SPEC NAA+PROBE: NOT DETECTED
HCO3 BLDA-SCNC: 22.8 MMOL/L (ref 22–28)
HCOV 229E RNA SPEC QL NAA+PROBE: NOT DETECTED
HCOV HKU1 RNA SPEC QL NAA+PROBE: NOT DETECTED
HCOV NL63 RNA SPEC QL NAA+PROBE: NOT DETECTED
HCOV OC43 RNA SPEC QL NAA+PROBE: NOT DETECTED
HCT VFR BLD AUTO: 35.4 % (ref 34–46.6)
HGB BLD-MCNC: 12.2 G/DL (ref 12–15.9)
HGB UR QL STRIP.AUTO: ABNORMAL
HMPV RNA NPH QL NAA+NON-PROBE: NOT DETECTED
HPIV1 RNA SPEC QL NAA+PROBE: NOT DETECTED
HPIV2 RNA SPEC QL NAA+PROBE: NOT DETECTED
HPIV3 RNA NPH QL NAA+PROBE: NOT DETECTED
HPIV4 P GENE NPH QL NAA+PROBE: NOT DETECTED
HYALINE CASTS UR QL AUTO: ABNORMAL /LPF
INHALED O2 CONCENTRATION: 21 %
KETONES UR QL STRIP: NEGATIVE
LEUKOCYTE ESTERASE UR QL STRIP.AUTO: ABNORMAL
LYMPHOCYTES # BLD MANUAL: 0.11 10*3/MM3 (ref 0.7–3.1)
LYMPHOCYTES NFR BLD MANUAL: 6 % (ref 19.6–45.3)
LYMPHOCYTES NFR BLD MANUAL: 6 % (ref 5–12)
M PNEUMO IGG SER IA-ACNC: NOT DETECTED
MCH RBC QN AUTO: 30.2 PG (ref 26.6–33)
MCHC RBC AUTO-ENTMCNC: 34.5 G/DL (ref 31.5–35.7)
MCV RBC AUTO: 87.6 FL (ref 79–97)
MODALITY: ABNORMAL
MONOCYTES # BLD AUTO: 0.11 10*3/MM3 (ref 0.1–0.9)
NEUTROPHILS # BLD AUTO: 1.58 10*3/MM3 (ref 1.7–7)
NEUTROPHILS NFR BLD MANUAL: 88 % (ref 42.7–76)
NITRITE UR QL STRIP: NEGATIVE
NT-PROBNP SERPL-MCNC: 391.9 PG/ML (ref 0–1800)
O2 A-A PPRESDIFF RESPIRATORY: 0.6 MMHG
PCO2 BLDA: 30 MM HG (ref 35–45)
PH BLDA: 7.49 PH UNITS (ref 7.35–7.45)
PH UR STRIP.AUTO: <=5 [PH] (ref 5–8)
PLAT MORPH BLD: NORMAL
PLATELET # BLD AUTO: 48 10*3/MM3 (ref 140–450)
PMV BLD AUTO: 11.3 FL (ref 6–12)
PO2 BLDA: 73.1 MM HG (ref 80–100)
POTASSIUM SERPL-SCNC: 3.5 MMOL/L (ref 3.5–5.2)
PROCALCITONIN SERPL-MCNC: 0.48 NG/ML (ref 0–0.25)
PROT SERPL-MCNC: 6.5 G/DL (ref 6–8.5)
PROT UR QL STRIP: ABNORMAL
RBC # BLD AUTO: 4.04 10*6/MM3 (ref 3.77–5.28)
RBC # UR: ABNORMAL /HPF
RBC MORPH BLD: NORMAL
REF LAB TEST METHOD: ABNORMAL
RHINOVIRUS RNA SPEC NAA+PROBE: NOT DETECTED
RSV RNA NPH QL NAA+NON-PROBE: NOT DETECTED
SAO2 % BLDCOA: 95.9 % (ref 92–99)
SARS-COV-2 RNA NPH QL NAA+NON-PROBE: NOT DETECTED
SODIUM SERPL-SCNC: 127 MMOL/L (ref 136–145)
SP GR UR STRIP: 1.02 (ref 1–1.03)
SQUAMOUS #/AREA URNS HPF: ABNORMAL /HPF
TOTAL RATE: 18 BREATHS/MINUTE
TRANS CELLS #/AREA URNS HPF: ABNORMAL /HPF
TROPONIN T SERPL-MCNC: <0.01 NG/ML (ref 0–0.03)
UROBILINOGEN UR QL STRIP: ABNORMAL
WBC # BLD AUTO: 1.79 10*3/MM3 (ref 3.4–10.8)
WBC MORPH BLD: NORMAL
WBC UR QL AUTO: ABNORMAL /HPF

## 2020-07-18 PROCEDURE — 87040 BLOOD CULTURE FOR BACTERIA: CPT | Performed by: EMERGENCY MEDICINE

## 2020-07-18 PROCEDURE — 85007 BL SMEAR W/DIFF WBC COUNT: CPT | Performed by: EMERGENCY MEDICINE

## 2020-07-18 PROCEDURE — 87186 SC STD MICRODIL/AGAR DIL: CPT | Performed by: EMERGENCY MEDICINE

## 2020-07-18 PROCEDURE — 85025 COMPLETE CBC W/AUTO DIFF WBC: CPT | Performed by: EMERGENCY MEDICINE

## 2020-07-18 PROCEDURE — 84484 ASSAY OF TROPONIN QUANT: CPT | Performed by: EMERGENCY MEDICINE

## 2020-07-18 PROCEDURE — 82803 BLOOD GASES ANY COMBINATION: CPT

## 2020-07-18 PROCEDURE — 99284 EMERGENCY DEPT VISIT MOD MDM: CPT

## 2020-07-18 PROCEDURE — 93005 ELECTROCARDIOGRAM TRACING: CPT | Performed by: EMERGENCY MEDICINE

## 2020-07-18 PROCEDURE — 87086 URINE CULTURE/COLONY COUNT: CPT | Performed by: EMERGENCY MEDICINE

## 2020-07-18 PROCEDURE — 0202U NFCT DS 22 TRGT SARS-COV-2: CPT | Performed by: EMERGENCY MEDICINE

## 2020-07-18 PROCEDURE — 36600 WITHDRAWAL OF ARTERIAL BLOOD: CPT

## 2020-07-18 PROCEDURE — P9612 CATHETERIZE FOR URINE SPEC: HCPCS

## 2020-07-18 PROCEDURE — 83605 ASSAY OF LACTIC ACID: CPT | Performed by: EMERGENCY MEDICINE

## 2020-07-18 PROCEDURE — 81001 URINALYSIS AUTO W/SCOPE: CPT | Performed by: EMERGENCY MEDICINE

## 2020-07-18 PROCEDURE — 83880 ASSAY OF NATRIURETIC PEPTIDE: CPT | Performed by: EMERGENCY MEDICINE

## 2020-07-18 PROCEDURE — 36415 COLL VENOUS BLD VENIPUNCTURE: CPT

## 2020-07-18 PROCEDURE — 25010000002 CEFEPIME PER 500 MG: Performed by: EMERGENCY MEDICINE

## 2020-07-18 PROCEDURE — 71045 X-RAY EXAM CHEST 1 VIEW: CPT

## 2020-07-18 PROCEDURE — 84145 PROCALCITONIN (PCT): CPT | Performed by: EMERGENCY MEDICINE

## 2020-07-18 PROCEDURE — 80053 COMPREHEN METABOLIC PANEL: CPT | Performed by: EMERGENCY MEDICINE

## 2020-07-18 PROCEDURE — 87077 CULTURE AEROBIC IDENTIFY: CPT | Performed by: EMERGENCY MEDICINE

## 2020-07-18 PROCEDURE — 93010 ELECTROCARDIOGRAM REPORT: CPT | Performed by: INTERNAL MEDICINE

## 2020-07-18 RX ORDER — SODIUM CHLORIDE 9 MG/ML
125 INJECTION, SOLUTION INTRAVENOUS CONTINUOUS
Status: DISCONTINUED | OUTPATIENT
Start: 2020-07-18 | End: 2020-07-19

## 2020-07-18 RX ORDER — ACETAMINOPHEN 500 MG
1000 TABLET ORAL ONCE
Status: COMPLETED | OUTPATIENT
Start: 2020-07-18 | End: 2020-07-18

## 2020-07-18 RX ORDER — SODIUM CHLORIDE 0.9 % (FLUSH) 0.9 %
10 SYRINGE (ML) INJECTION AS NEEDED
Status: DISCONTINUED | OUTPATIENT
Start: 2020-07-18 | End: 2020-07-23 | Stop reason: HOSPADM

## 2020-07-18 RX ADMIN — SODIUM CHLORIDE 500 ML: 9 INJECTION, SOLUTION INTRAVENOUS at 22:37

## 2020-07-18 RX ADMIN — SODIUM CHLORIDE, PRESERVATIVE FREE 10 ML: 5 INJECTION INTRAVENOUS at 22:36

## 2020-07-18 RX ADMIN — CEFEPIME HYDROCHLORIDE 2 G: 2 INJECTION, POWDER, FOR SOLUTION INTRAVENOUS at 23:37

## 2020-07-18 RX ADMIN — ACETAMINOPHEN 1000 MG: 500 TABLET, FILM COATED ORAL at 22:35

## 2020-07-19 PROBLEM — N17.9 AKI (ACUTE KIDNEY INJURY): Status: ACTIVE | Noted: 2020-07-19

## 2020-07-19 PROBLEM — N39.0 ACUTE UTI: Status: ACTIVE | Noted: 2020-07-19

## 2020-07-19 PROBLEM — J45.909 ASTHMA: Status: ACTIVE | Noted: 2020-07-19

## 2020-07-19 PROBLEM — E87.1 HYPONATREMIA: Status: ACTIVE | Noted: 2020-07-19

## 2020-07-19 PROBLEM — A41.9 SEPSIS, UNSPECIFIED ORGANISM (HCC): Status: ACTIVE | Noted: 2020-07-19

## 2020-07-19 PROBLEM — D70.9 NEUTROPENIA (HCC): Status: ACTIVE | Noted: 2020-07-19

## 2020-07-19 LAB
ANION GAP SERPL CALCULATED.3IONS-SCNC: 9.7 MMOL/L (ref 5–15)
BUN SERPL-MCNC: 20 MG/DL (ref 8–23)
BUN/CREAT SERPL: 16.7 (ref 7–25)
CALCIUM SPEC-SCNC: 8.2 MG/DL (ref 8.6–10.5)
CHLORIDE SERPL-SCNC: 100 MMOL/L (ref 98–107)
CO2 SERPL-SCNC: 22.3 MMOL/L (ref 22–29)
CREAT SERPL-MCNC: 1.2 MG/DL (ref 0.57–1)
DEPRECATED RDW RBC AUTO: 40.6 FL (ref 37–54)
ERYTHROCYTE [DISTWIDTH] IN BLOOD BY AUTOMATED COUNT: 13.1 % (ref 12.3–15.4)
FERRITIN SERPL-MCNC: ABNORMAL NG/ML (ref 13–150)
GFR SERPL CREATININE-BSD FRML MDRD: 43 ML/MIN/1.73
GLUCOSE BLDC GLUCOMTR-MCNC: 106 MG/DL (ref 70–130)
GLUCOSE BLDC GLUCOMTR-MCNC: 186 MG/DL (ref 70–130)
GLUCOSE BLDC GLUCOMTR-MCNC: 197 MG/DL (ref 70–130)
GLUCOSE BLDC GLUCOMTR-MCNC: 302 MG/DL (ref 70–130)
GLUCOSE SERPL-MCNC: 303 MG/DL (ref 65–99)
HBA1C MFR BLD: 9.1 % (ref 4.8–5.6)
HCT VFR BLD AUTO: 31.9 % (ref 34–46.6)
HGB BLD-MCNC: 11 G/DL (ref 12–15.9)
IRON 24H UR-MRATE: 29 MCG/DL (ref 37–145)
IRON SATN MFR SERPL: 11 % (ref 20–50)
MCH RBC QN AUTO: 29.8 PG (ref 26.6–33)
MCHC RBC AUTO-ENTMCNC: 34.5 G/DL (ref 31.5–35.7)
MCV RBC AUTO: 86.4 FL (ref 79–97)
PLATELET # BLD AUTO: 43 10*3/MM3 (ref 140–450)
PMV BLD AUTO: 11.2 FL (ref 6–12)
POTASSIUM SERPL-SCNC: 3.8 MMOL/L (ref 3.5–5.2)
RBC # BLD AUTO: 3.69 10*6/MM3 (ref 3.77–5.28)
SODIUM SERPL-SCNC: 132 MMOL/L (ref 136–145)
TIBC SERPL-MCNC: 259 MCG/DL (ref 298–536)
TRANSFERRIN SERPL-MCNC: 174 MG/DL (ref 200–360)
WBC # BLD AUTO: 2.05 10*3/MM3 (ref 3.4–10.8)

## 2020-07-19 PROCEDURE — 99222 1ST HOSP IP/OBS MODERATE 55: CPT | Performed by: INTERNAL MEDICINE

## 2020-07-19 PROCEDURE — 25810000003 SODIUM CHLORIDE 0.9 % WITH KCL 20 MEQ 20-0.9 MEQ/L-% SOLUTION: Performed by: HOSPITALIST

## 2020-07-19 PROCEDURE — 83540 ASSAY OF IRON: CPT | Performed by: INTERNAL MEDICINE

## 2020-07-19 PROCEDURE — 36415 COLL VENOUS BLD VENIPUNCTURE: CPT | Performed by: NURSE PRACTITIONER

## 2020-07-19 PROCEDURE — 83036 HEMOGLOBIN GLYCOSYLATED A1C: CPT | Performed by: NURSE PRACTITIONER

## 2020-07-19 PROCEDURE — 63710000001 INSULIN LISPRO (HUMAN) PER 5 UNITS: Performed by: NURSE PRACTITIONER

## 2020-07-19 PROCEDURE — 25010000002 CEFTRIAXONE PER 250 MG: Performed by: HOSPITALIST

## 2020-07-19 PROCEDURE — 63710000001 INSULIN GLARGINE PER 5 UNITS: Performed by: HOSPITALIST

## 2020-07-19 PROCEDURE — 82962 GLUCOSE BLOOD TEST: CPT

## 2020-07-19 PROCEDURE — 94640 AIRWAY INHALATION TREATMENT: CPT

## 2020-07-19 PROCEDURE — 94799 UNLISTED PULMONARY SVC/PX: CPT

## 2020-07-19 PROCEDURE — 84466 ASSAY OF TRANSFERRIN: CPT | Performed by: INTERNAL MEDICINE

## 2020-07-19 PROCEDURE — 80048 BASIC METABOLIC PNL TOTAL CA: CPT | Performed by: NURSE PRACTITIONER

## 2020-07-19 PROCEDURE — 82728 ASSAY OF FERRITIN: CPT | Performed by: INTERNAL MEDICINE

## 2020-07-19 PROCEDURE — 85027 COMPLETE CBC AUTOMATED: CPT | Performed by: NURSE PRACTITIONER

## 2020-07-19 RX ORDER — DEXTROSE MONOHYDRATE 25 G/50ML
25 INJECTION, SOLUTION INTRAVENOUS
Status: DISCONTINUED | OUTPATIENT
Start: 2020-07-19 | End: 2020-07-23 | Stop reason: HOSPADM

## 2020-07-19 RX ORDER — CYCLOBENZAPRINE HCL 10 MG
10 TABLET ORAL 3 TIMES DAILY PRN
Status: DISCONTINUED | OUTPATIENT
Start: 2020-07-19 | End: 2020-07-23 | Stop reason: HOSPADM

## 2020-07-19 RX ORDER — CEFTRIAXONE SODIUM 1 G/50ML
1 INJECTION, SOLUTION INTRAVENOUS EVERY 24 HOURS
Status: COMPLETED | OUTPATIENT
Start: 2020-07-19 | End: 2020-07-21

## 2020-07-19 RX ORDER — SODIUM CHLORIDE AND POTASSIUM CHLORIDE 150; 900 MG/100ML; MG/100ML
100 INJECTION, SOLUTION INTRAVENOUS CONTINUOUS
Status: DISCONTINUED | OUTPATIENT
Start: 2020-07-19 | End: 2020-07-20

## 2020-07-19 RX ORDER — ACETAMINOPHEN 325 MG/1
650 TABLET ORAL EVERY 4 HOURS PRN
Status: DISCONTINUED | OUTPATIENT
Start: 2020-07-19 | End: 2020-07-23 | Stop reason: HOSPADM

## 2020-07-19 RX ORDER — ONDANSETRON 2 MG/ML
4 INJECTION INTRAMUSCULAR; INTRAVENOUS EVERY 6 HOURS PRN
Status: DISCONTINUED | OUTPATIENT
Start: 2020-07-19 | End: 2020-07-23 | Stop reason: HOSPADM

## 2020-07-19 RX ORDER — FLUTICASONE PROPIONATE 50 MCG
1 SPRAY, SUSPENSION (ML) NASAL DAILY
Status: DISCONTINUED | OUTPATIENT
Start: 2020-07-19 | End: 2020-07-23 | Stop reason: HOSPADM

## 2020-07-19 RX ORDER — ACETAMINOPHEN 650 MG/1
650 SUPPOSITORY RECTAL EVERY 4 HOURS PRN
Status: DISCONTINUED | OUTPATIENT
Start: 2020-07-19 | End: 2020-07-23 | Stop reason: HOSPADM

## 2020-07-19 RX ORDER — ASPIRIN 81 MG/1
81 TABLET ORAL DAILY
Status: DISCONTINUED | OUTPATIENT
Start: 2020-07-19 | End: 2020-07-23

## 2020-07-19 RX ORDER — ACETAMINOPHEN 160 MG/5ML
650 SOLUTION ORAL EVERY 4 HOURS PRN
Status: DISCONTINUED | OUTPATIENT
Start: 2020-07-19 | End: 2020-07-23 | Stop reason: HOSPADM

## 2020-07-19 RX ORDER — SODIUM CHLORIDE 0.9 % (FLUSH) 0.9 %
10 SYRINGE (ML) INJECTION AS NEEDED
Status: DISCONTINUED | OUTPATIENT
Start: 2020-07-19 | End: 2020-07-23 | Stop reason: HOSPADM

## 2020-07-19 RX ORDER — SODIUM CHLORIDE 0.9 % (FLUSH) 0.9 %
10 SYRINGE (ML) INJECTION EVERY 12 HOURS SCHEDULED
Status: DISCONTINUED | OUTPATIENT
Start: 2020-07-19 | End: 2020-07-23 | Stop reason: HOSPADM

## 2020-07-19 RX ORDER — LATANOPROST 50 UG/ML
1 SOLUTION/ DROPS OPHTHALMIC DAILY
Status: DISCONTINUED | OUTPATIENT
Start: 2020-07-19 | End: 2020-07-23 | Stop reason: HOSPADM

## 2020-07-19 RX ORDER — INSULIN GLARGINE 100 [IU]/ML
10 INJECTION, SOLUTION SUBCUTANEOUS ONCE
Status: COMPLETED | OUTPATIENT
Start: 2020-07-19 | End: 2020-07-19

## 2020-07-19 RX ORDER — ALBUTEROL SULFATE 2.5 MG/3ML
2.5 SOLUTION RESPIRATORY (INHALATION) EVERY 6 HOURS PRN
Status: DISCONTINUED | OUTPATIENT
Start: 2020-07-19 | End: 2020-07-23 | Stop reason: HOSPADM

## 2020-07-19 RX ORDER — PANTOPRAZOLE SODIUM 40 MG/1
40 TABLET, DELAYED RELEASE ORAL EVERY MORNING
Status: DISCONTINUED | OUTPATIENT
Start: 2020-07-19 | End: 2020-07-23 | Stop reason: HOSPADM

## 2020-07-19 RX ORDER — NICOTINE POLACRILEX 4 MG
15 LOZENGE BUCCAL
Status: DISCONTINUED | OUTPATIENT
Start: 2020-07-19 | End: 2020-07-23 | Stop reason: HOSPADM

## 2020-07-19 RX ORDER — AMLODIPINE BESYLATE 5 MG/1
5 TABLET ORAL DAILY
Status: DISCONTINUED | OUTPATIENT
Start: 2020-07-19 | End: 2020-07-23 | Stop reason: HOSPADM

## 2020-07-19 RX ADMIN — INSULIN GLARGINE 10 UNITS: 100 INJECTION, SOLUTION SUBCUTANEOUS at 12:46

## 2020-07-19 RX ADMIN — SODIUM CHLORIDE, PRESERVATIVE FREE 10 ML: 5 INJECTION INTRAVENOUS at 21:45

## 2020-07-19 RX ADMIN — POTASSIUM CHLORIDE AND SODIUM CHLORIDE 100 ML/HR: 900; 150 INJECTION, SOLUTION INTRAVENOUS at 12:44

## 2020-07-19 RX ADMIN — PANTOPRAZOLE SODIUM 40 MG: 40 TABLET, DELAYED RELEASE ORAL at 06:10

## 2020-07-19 RX ADMIN — INSULIN LISPRO 2 UNITS: 100 INJECTION, SOLUTION INTRAVENOUS; SUBCUTANEOUS at 12:45

## 2020-07-19 RX ADMIN — INSULIN LISPRO 7 UNITS: 100 INJECTION, SOLUTION INTRAVENOUS; SUBCUTANEOUS at 08:17

## 2020-07-19 RX ADMIN — INSULIN LISPRO 2 UNITS: 100 INJECTION, SOLUTION INTRAVENOUS; SUBCUTANEOUS at 16:53

## 2020-07-19 RX ADMIN — CEFTRIAXONE SODIUM 1 G: 1 INJECTION, SOLUTION INTRAVENOUS at 12:44

## 2020-07-19 RX ADMIN — SODIUM CHLORIDE 125 ML/HR: 9 INJECTION, SOLUTION INTRAVENOUS at 02:35

## 2020-07-19 RX ADMIN — SODIUM CHLORIDE, PRESERVATIVE FREE 10 ML: 5 INJECTION INTRAVENOUS at 02:35

## 2020-07-19 RX ADMIN — ASPIRIN 81 MG: 81 TABLET, COATED ORAL at 08:17

## 2020-07-19 RX ADMIN — AMLODIPINE BESYLATE 5 MG: 5 TABLET ORAL at 08:17

## 2020-07-19 RX ADMIN — LATANOPROST 1 DROP: 50 SOLUTION OPHTHALMIC at 11:06

## 2020-07-19 RX ADMIN — FLUTICASONE PROPIONATE 1 SPRAY: 50 SPRAY, METERED NASAL at 11:06

## 2020-07-20 LAB
ALBUMIN SERPL-MCNC: 2.6 G/DL (ref 3.5–5.2)
ANION GAP SERPL CALCULATED.3IONS-SCNC: 10 MMOL/L (ref 5–15)
BACTERIA SPEC AEROBE CULT: ABNORMAL
BUN SERPL-MCNC: 16 MG/DL (ref 8–23)
BUN/CREAT SERPL: 17.2 (ref 7–25)
CALCIUM SPEC-SCNC: 7.8 MG/DL (ref 8.6–10.5)
CHLORIDE SERPL-SCNC: 103 MMOL/L (ref 98–107)
CO2 SERPL-SCNC: 18 MMOL/L (ref 22–29)
CREAT SERPL-MCNC: 0.93 MG/DL (ref 0.57–1)
DEPRECATED RDW RBC AUTO: 41.5 FL (ref 37–54)
ERYTHROCYTE [DISTWIDTH] IN BLOOD BY AUTOMATED COUNT: 13.1 % (ref 12.3–15.4)
FOLATE SERPL-MCNC: >20 NG/ML (ref 4.78–24.2)
GFR SERPL CREATININE-BSD FRML MDRD: 58 ML/MIN/1.73
GLUCOSE BLDC GLUCOMTR-MCNC: 125 MG/DL (ref 70–130)
GLUCOSE BLDC GLUCOMTR-MCNC: 221 MG/DL (ref 70–130)
GLUCOSE BLDC GLUCOMTR-MCNC: 225 MG/DL (ref 70–130)
GLUCOSE BLDC GLUCOMTR-MCNC: 257 MG/DL (ref 70–130)
GLUCOSE SERPL-MCNC: 138 MG/DL (ref 65–99)
HCT VFR BLD AUTO: 32.5 % (ref 34–46.6)
HGB BLD-MCNC: 11.2 G/DL (ref 12–15.9)
MAGNESIUM SERPL-MCNC: 1.9 MG/DL (ref 1.6–2.4)
MCH RBC QN AUTO: 29.6 PG (ref 26.6–33)
MCHC RBC AUTO-ENTMCNC: 34.5 G/DL (ref 31.5–35.7)
MCV RBC AUTO: 86 FL (ref 79–97)
PHOSPHATE SERPL-MCNC: 2.5 MG/DL (ref 2.5–4.5)
PLATELET # BLD AUTO: 43 10*3/MM3 (ref 140–450)
PMV BLD AUTO: 11.4 FL (ref 6–12)
POTASSIUM SERPL-SCNC: 3.4 MMOL/L (ref 3.5–5.2)
RBC # BLD AUTO: 3.78 10*6/MM3 (ref 3.77–5.28)
SODIUM SERPL-SCNC: 131 MMOL/L (ref 136–145)
VIT B12 BLD-MCNC: >2000 PG/ML (ref 211–946)
WBC # BLD AUTO: 2.34 10*3/MM3 (ref 3.4–10.8)

## 2020-07-20 PROCEDURE — 85025 COMPLETE CBC W/AUTO DIFF WBC: CPT | Performed by: HOSPITALIST

## 2020-07-20 PROCEDURE — 83921 ORGANIC ACID SINGLE QUANT: CPT | Performed by: INTERNAL MEDICINE

## 2020-07-20 PROCEDURE — 63710000001 INSULIN GLARGINE PER 5 UNITS: Performed by: HOSPITALIST

## 2020-07-20 PROCEDURE — 97162 PT EVAL MOD COMPLEX 30 MIN: CPT

## 2020-07-20 PROCEDURE — 25010000002 CEFTRIAXONE PER 250 MG: Performed by: HOSPITALIST

## 2020-07-20 PROCEDURE — 25810000003 SODIUM CHLORIDE 0.9 % WITH KCL 20 MEQ 20-0.9 MEQ/L-% SOLUTION: Performed by: HOSPITALIST

## 2020-07-20 PROCEDURE — 63710000001 INSULIN LISPRO (HUMAN) PER 5 UNITS: Performed by: NURSE PRACTITIONER

## 2020-07-20 PROCEDURE — 82746 ASSAY OF FOLIC ACID SERUM: CPT | Performed by: INTERNAL MEDICINE

## 2020-07-20 PROCEDURE — 80069 RENAL FUNCTION PANEL: CPT | Performed by: HOSPITALIST

## 2020-07-20 PROCEDURE — 25010000002 ONDANSETRON PER 1 MG: Performed by: NURSE PRACTITIONER

## 2020-07-20 PROCEDURE — 82607 VITAMIN B-12: CPT | Performed by: INTERNAL MEDICINE

## 2020-07-20 PROCEDURE — 83735 ASSAY OF MAGNESIUM: CPT | Performed by: HOSPITALIST

## 2020-07-20 PROCEDURE — 82962 GLUCOSE BLOOD TEST: CPT

## 2020-07-20 RX ORDER — INSULIN GLARGINE 100 [IU]/ML
8 INJECTION, SOLUTION SUBCUTANEOUS NIGHTLY
Status: DISCONTINUED | OUTPATIENT
Start: 2020-07-20 | End: 2020-07-23 | Stop reason: HOSPADM

## 2020-07-20 RX ORDER — POTASSIUM CHLORIDE 750 MG/1
40 CAPSULE, EXTENDED RELEASE ORAL ONCE
Status: COMPLETED | OUTPATIENT
Start: 2020-07-20 | End: 2020-07-20

## 2020-07-20 RX ADMIN — POTASSIUM CHLORIDE AND SODIUM CHLORIDE 100 ML/HR: 900; 150 INJECTION, SOLUTION INTRAVENOUS at 00:45

## 2020-07-20 RX ADMIN — POTASSIUM CHLORIDE AND SODIUM CHLORIDE 100 ML/HR: 900; 150 INJECTION, SOLUTION INTRAVENOUS at 12:04

## 2020-07-20 RX ADMIN — POTASSIUM CHLORIDE 40 MEQ: 10 CAPSULE, COATED, EXTENDED RELEASE ORAL at 12:26

## 2020-07-20 RX ADMIN — FLUTICASONE PROPIONATE 1 SPRAY: 50 SPRAY, METERED NASAL at 10:01

## 2020-07-20 RX ADMIN — ONDANSETRON 4 MG: 2 INJECTION INTRAMUSCULAR; INTRAVENOUS at 01:13

## 2020-07-20 RX ADMIN — INSULIN LISPRO 4 UNITS: 100 INJECTION, SOLUTION INTRAVENOUS; SUBCUTANEOUS at 17:54

## 2020-07-20 RX ADMIN — INSULIN GLARGINE 8 UNITS: 100 INJECTION, SOLUTION SUBCUTANEOUS at 22:00

## 2020-07-20 RX ADMIN — SODIUM CHLORIDE, PRESERVATIVE FREE 10 ML: 5 INJECTION INTRAVENOUS at 10:02

## 2020-07-20 RX ADMIN — CYCLOBENZAPRINE 10 MG: 10 TABLET, FILM COATED ORAL at 22:10

## 2020-07-20 RX ADMIN — INSULIN LISPRO 4 UNITS: 100 INJECTION, SOLUTION INTRAVENOUS; SUBCUTANEOUS at 12:15

## 2020-07-20 RX ADMIN — ASPIRIN 81 MG: 81 TABLET, COATED ORAL at 10:01

## 2020-07-20 RX ADMIN — AMLODIPINE BESYLATE 5 MG: 5 TABLET ORAL at 10:01

## 2020-07-20 RX ADMIN — LATANOPROST 1 DROP: 50 SOLUTION OPHTHALMIC at 11:53

## 2020-07-20 RX ADMIN — SODIUM CHLORIDE, PRESERVATIVE FREE 10 ML: 5 INJECTION INTRAVENOUS at 22:00

## 2020-07-20 RX ADMIN — PANTOPRAZOLE SODIUM 40 MG: 40 TABLET, DELAYED RELEASE ORAL at 06:47

## 2020-07-20 RX ADMIN — CEFTRIAXONE SODIUM 1 G: 1 INJECTION, SOLUTION INTRAVENOUS at 12:26

## 2020-07-21 LAB
DEPRECATED RDW RBC AUTO: 41.5 FL (ref 37–54)
ELLIPTOCYTES BLD QL SMEAR: ABNORMAL
ERYTHROCYTE [DISTWIDTH] IN BLOOD BY AUTOMATED COUNT: 13.2 % (ref 12.3–15.4)
GLUCOSE BLDC GLUCOMTR-MCNC: 115 MG/DL (ref 70–130)
GLUCOSE BLDC GLUCOMTR-MCNC: 178 MG/DL (ref 70–130)
GLUCOSE BLDC GLUCOMTR-MCNC: 197 MG/DL (ref 70–130)
GLUCOSE BLDC GLUCOMTR-MCNC: 232 MG/DL (ref 70–130)
HCT VFR BLD AUTO: 29.4 % (ref 34–46.6)
HGB BLD-MCNC: 10.3 G/DL (ref 12–15.9)
LYMPHOCYTES # BLD MANUAL: 0.7 10*3/MM3 (ref 0.7–3.1)
LYMPHOCYTES NFR BLD MANUAL: 15.2 % (ref 5–12)
LYMPHOCYTES NFR BLD MANUAL: 19.2 % (ref 19.6–45.3)
MCH RBC QN AUTO: 29.9 PG (ref 26.6–33)
MCHC RBC AUTO-ENTMCNC: 35 G/DL (ref 31.5–35.7)
MCV RBC AUTO: 85.5 FL (ref 79–97)
MONOCYTES # BLD AUTO: 0.55 10*3/MM3 (ref 0.1–0.9)
NEUTROPHILS # BLD AUTO: 2.38 10*3/MM3 (ref 1.7–7)
NEUTROPHILS NFR BLD MANUAL: 65.7 % (ref 42.7–76)
PLAT MORPH BLD: NORMAL
PLATELET # BLD AUTO: 60 10*3/MM3 (ref 140–450)
PMV BLD AUTO: 12.1 FL (ref 6–12)
RBC # BLD AUTO: 3.44 10*6/MM3 (ref 3.77–5.28)
WBC # BLD AUTO: 3.62 10*3/MM3 (ref 3.4–10.8)
WBC MORPH BLD: NORMAL

## 2020-07-21 PROCEDURE — 25010000002 CEFTRIAXONE PER 250 MG: Performed by: HOSPITALIST

## 2020-07-21 PROCEDURE — 82962 GLUCOSE BLOOD TEST: CPT

## 2020-07-21 PROCEDURE — 63710000001 INSULIN GLARGINE PER 5 UNITS: Performed by: HOSPITALIST

## 2020-07-21 PROCEDURE — 63710000001 INSULIN LISPRO (HUMAN) PER 5 UNITS: Performed by: NURSE PRACTITIONER

## 2020-07-21 PROCEDURE — 93005 ELECTROCARDIOGRAM TRACING: CPT | Performed by: HOSPITALIST

## 2020-07-21 PROCEDURE — 85007 BL SMEAR W/DIFF WBC COUNT: CPT | Performed by: HOSPITALIST

## 2020-07-21 PROCEDURE — 97110 THERAPEUTIC EXERCISES: CPT

## 2020-07-21 PROCEDURE — 93010 ELECTROCARDIOGRAM REPORT: CPT | Performed by: INTERNAL MEDICINE

## 2020-07-21 PROCEDURE — 85025 COMPLETE CBC W/AUTO DIFF WBC: CPT | Performed by: HOSPITALIST

## 2020-07-21 PROCEDURE — 99231 SBSQ HOSP IP/OBS SF/LOW 25: CPT | Performed by: INTERNAL MEDICINE

## 2020-07-21 RX ADMIN — CEFTRIAXONE SODIUM 1 G: 1 INJECTION, SOLUTION INTRAVENOUS at 13:05

## 2020-07-21 RX ADMIN — FLUTICASONE PROPIONATE 1 SPRAY: 50 SPRAY, METERED NASAL at 10:05

## 2020-07-21 RX ADMIN — PANTOPRAZOLE SODIUM 40 MG: 40 TABLET, DELAYED RELEASE ORAL at 06:28

## 2020-07-21 RX ADMIN — ASPIRIN 81 MG: 81 TABLET, COATED ORAL at 10:05

## 2020-07-21 RX ADMIN — SODIUM CHLORIDE, PRESERVATIVE FREE 10 ML: 5 INJECTION INTRAVENOUS at 10:05

## 2020-07-21 RX ADMIN — INSULIN GLARGINE 8 UNITS: 100 INJECTION, SOLUTION SUBCUTANEOUS at 21:13

## 2020-07-21 RX ADMIN — SODIUM CHLORIDE, PRESERVATIVE FREE 10 ML: 5 INJECTION INTRAVENOUS at 21:15

## 2020-07-21 RX ADMIN — LATANOPROST 1 DROP: 50 SOLUTION OPHTHALMIC at 13:05

## 2020-07-21 RX ADMIN — INSULIN LISPRO 4 UNITS: 100 INJECTION, SOLUTION INTRAVENOUS; SUBCUTANEOUS at 13:05

## 2020-07-21 RX ADMIN — AMLODIPINE BESYLATE 5 MG: 5 TABLET ORAL at 10:05

## 2020-07-21 RX ADMIN — INSULIN LISPRO 2 UNITS: 100 INJECTION, SOLUTION INTRAVENOUS; SUBCUTANEOUS at 18:47

## 2020-07-22 ENCOUNTER — APPOINTMENT (OUTPATIENT)
Dept: CARDIOLOGY | Facility: HOSPITAL | Age: 80
End: 2020-07-22

## 2020-07-22 PROBLEM — I48.0 PAROXYSMAL ATRIAL FIBRILLATION (HCC): Status: ACTIVE | Noted: 2020-07-22

## 2020-07-22 PROBLEM — N17.9 AKI (ACUTE KIDNEY INJURY) (HCC): Status: RESOLVED | Noted: 2020-07-19 | Resolved: 2020-07-22

## 2020-07-22 PROBLEM — E87.1 HYPONATREMIA: Status: RESOLVED | Noted: 2020-07-19 | Resolved: 2020-07-22

## 2020-07-22 PROBLEM — D61.818 PANCYTOPENIA: Status: ACTIVE | Noted: 2020-07-22

## 2020-07-22 LAB
ALBUMIN SERPL-MCNC: 3 G/DL (ref 3.5–5.2)
ALBUMIN/GLOB SERPL: 1 G/DL
ALP SERPL-CCNC: 146 U/L (ref 39–117)
ALT SERPL W P-5'-P-CCNC: 55 U/L (ref 1–33)
ANION GAP SERPL CALCULATED.3IONS-SCNC: 11.7 MMOL/L (ref 5–15)
AORTIC DIMENSIONLESS INDEX: 0.6 (DI)
AST SERPL-CCNC: 80 U/L (ref 1–32)
BASOPHILS # BLD MANUAL: 0.05 10*3/MM3 (ref 0–0.2)
BASOPHILS NFR BLD AUTO: 1 % (ref 0–1.5)
BH CV ECHO MEAS - ACS: 1.9 CM
BH CV ECHO MEAS - AO MAX PG (FULL): 12.5 MMHG
BH CV ECHO MEAS - AO MAX PG: 18.8 MMHG
BH CV ECHO MEAS - AO MEAN PG (FULL): 5 MMHG
BH CV ECHO MEAS - AO MEAN PG: 8 MMHG
BH CV ECHO MEAS - AO ROOT AREA (BSA CORRECTED): 1.6
BH CV ECHO MEAS - AO ROOT AREA: 5.7 CM^2
BH CV ECHO MEAS - AO ROOT DIAM: 2.7 CM
BH CV ECHO MEAS - AO V2 MAX: 217 CM/SEC
BH CV ECHO MEAS - AO V2 MEAN: 133 CM/SEC
BH CV ECHO MEAS - AO V2 VTI: 45.8 CM
BH CV ECHO MEAS - ASC AORTA: 3.3 CM
BH CV ECHO MEAS - AVA(I,A): 1.4 CM^2
BH CV ECHO MEAS - AVA(I,D): 1.4 CM^2
BH CV ECHO MEAS - AVA(V,A): 1.5 CM^2
BH CV ECHO MEAS - AVA(V,D): 1.5 CM^2
BH CV ECHO MEAS - BSA(HAYCOCK): 1.7 M^2
BH CV ECHO MEAS - BSA: 1.7 M^2
BH CV ECHO MEAS - BZI_BMI: 25.6 KILOGRAMS/M^2
BH CV ECHO MEAS - BZI_METRIC_HEIGHT: 158 CM
BH CV ECHO MEAS - BZI_METRIC_WEIGHT: 64 KG
BH CV ECHO MEAS - EDV(CUBED): 64 ML
BH CV ECHO MEAS - EDV(MOD-SP2): 99 ML
BH CV ECHO MEAS - EDV(MOD-SP4): 100 ML
BH CV ECHO MEAS - EDV(TEICH): 70 ML
BH CV ECHO MEAS - EF(CUBED): 65.7 %
BH CV ECHO MEAS - EF(MOD-BP): 69 %
BH CV ECHO MEAS - EF(MOD-SP2): 71.7 %
BH CV ECHO MEAS - EF(MOD-SP4): 66 %
BH CV ECHO MEAS - EF(TEICH): 57.8 %
BH CV ECHO MEAS - ESV(CUBED): 22 ML
BH CV ECHO MEAS - ESV(MOD-SP2): 28 ML
BH CV ECHO MEAS - ESV(MOD-SP4): 34 ML
BH CV ECHO MEAS - ESV(TEICH): 29.6 ML
BH CV ECHO MEAS - FS: 30 %
BH CV ECHO MEAS - IVS/LVPW: 1
BH CV ECHO MEAS - IVSD: 0.8 CM
BH CV ECHO MEAS - LAT PEAK E' VEL: 10.6 CM/SEC
BH CV ECHO MEAS - LV DIASTOLIC VOL/BSA (35-75): 60.5 ML/M^2
BH CV ECHO MEAS - LV MASS(C)D: 93.5 GRAMS
BH CV ECHO MEAS - LV MASS(C)DI: 56.6 GRAMS/M^2
BH CV ECHO MEAS - LV MAX PG: 6.4 MMHG
BH CV ECHO MEAS - LV MEAN PG: 3 MMHG
BH CV ECHO MEAS - LV SYSTOLIC VOL/BSA (12-30): 20.6 ML/M^2
BH CV ECHO MEAS - LV V1 MAX: 126 CM/SEC
BH CV ECHO MEAS - LV V1 MEAN: 83.6 CM/SEC
BH CV ECHO MEAS - LV V1 VTI: 25.7 CM
BH CV ECHO MEAS - LVIDD: 4 CM
BH CV ECHO MEAS - LVIDS: 2.8 CM
BH CV ECHO MEAS - LVLD AP2: 8.3 CM
BH CV ECHO MEAS - LVLD AP4: 8.6 CM
BH CV ECHO MEAS - LVLS AP2: 7.5 CM
BH CV ECHO MEAS - LVLS AP4: 7 CM
BH CV ECHO MEAS - LVOT AREA (M): 2.5 CM^2
BH CV ECHO MEAS - LVOT AREA: 2.5 CM^2
BH CV ECHO MEAS - LVOT DIAM: 1.8 CM
BH CV ECHO MEAS - LVPWD: 0.8 CM
BH CV ECHO MEAS - MED PEAK E' VEL: 8.4 CM/SEC
BH CV ECHO MEAS - MV A DUR: 158 SEC
BH CV ECHO MEAS - MV A MAX VEL: 133 CM/SEC
BH CV ECHO MEAS - MV DEC SLOPE: 682 CM/SEC^2
BH CV ECHO MEAS - MV DEC TIME: 180 SEC
BH CV ECHO MEAS - MV E MAX VEL: 98.1 CM/SEC
BH CV ECHO MEAS - MV E/A: 0.74
BH CV ECHO MEAS - MV MAX PG: 8.3 MMHG
BH CV ECHO MEAS - MV MEAN PG: 3 MMHG
BH CV ECHO MEAS - MV P1/2T MAX VEL: 121 CM/SEC
BH CV ECHO MEAS - MV P1/2T: 52 MSEC
BH CV ECHO MEAS - MV V2 MAX: 144 CM/SEC
BH CV ECHO MEAS - MV V2 MEAN: 78.1 CM/SEC
BH CV ECHO MEAS - MV V2 VTI: 29.2 CM
BH CV ECHO MEAS - MVA P1/2T LCG: 1.8 CM^2
BH CV ECHO MEAS - MVA(P1/2T): 4.2 CM^2
BH CV ECHO MEAS - MVA(VTI): 2.2 CM^2
BH CV ECHO MEAS - PA ACC TIME: 0.11 SEC
BH CV ECHO MEAS - PA MAX PG (FULL): 2 MMHG
BH CV ECHO MEAS - PA MAX PG: 4.3 MMHG
BH CV ECHO MEAS - PA PR(ACCEL): 31.3 MMHG
BH CV ECHO MEAS - PA V2 MAX: 104 CM/SEC
BH CV ECHO MEAS - PULM A REVS DUR: 0.12 SEC
BH CV ECHO MEAS - PULM A REVS VEL: 36.2 CM/SEC
BH CV ECHO MEAS - PULM DIAS VEL: 55.6 CM/SEC
BH CV ECHO MEAS - PULM S/D: 1.3
BH CV ECHO MEAS - PULM SYS VEL: 70.3 CM/SEC
BH CV ECHO MEAS - PVA(V,A): 2.8 CM^2
BH CV ECHO MEAS - PVA(V,D): 2.8 CM^2
BH CV ECHO MEAS - QP/QS: 0.87
BH CV ECHO MEAS - RAP SYSTOLE: 8 MMHG
BH CV ECHO MEAS - RV MAX PG: 2.3 MMHG
BH CV ECHO MEAS - RV MEAN PG: 1 MMHG
BH CV ECHO MEAS - RV V1 MAX: 75.7 CM/SEC
BH CV ECHO MEAS - RV V1 MEAN: 53.3 CM/SEC
BH CV ECHO MEAS - RV V1 VTI: 14.9 CM
BH CV ECHO MEAS - RVOT AREA: 3.8 CM^2
BH CV ECHO MEAS - RVOT DIAM: 2.2 CM
BH CV ECHO MEAS - RVSP: 37 MMHG
BH CV ECHO MEAS - SI(AO): 158.7 ML/M^2
BH CV ECHO MEAS - SI(CUBED): 25.5 ML/M^2
BH CV ECHO MEAS - SI(LVOT): 39.6 ML/M^2
BH CV ECHO MEAS - SI(MOD-SP2): 43 ML/M^2
BH CV ECHO MEAS - SI(MOD-SP4): 40 ML/M^2
BH CV ECHO MEAS - SI(TEICH): 24.5 ML/M^2
BH CV ECHO MEAS - SV(AO): 262.2 ML
BH CV ECHO MEAS - SV(CUBED): 42 ML
BH CV ECHO MEAS - SV(LVOT): 65.4 ML
BH CV ECHO MEAS - SV(MOD-SP2): 71 ML
BH CV ECHO MEAS - SV(MOD-SP4): 66 ML
BH CV ECHO MEAS - SV(RVOT): 56.6 ML
BH CV ECHO MEAS - SV(TEICH): 40.4 ML
BH CV ECHO MEAS - TAPSE (>1.6): 3.5 CM2
BH CV ECHO MEAS - TR MAX PG: 29 MMHG
BH CV ECHO MEAS - TR MAX VEL: 271 CM/SEC
BH CV ECHO MEASUREMENTS AVERAGE E/E' RATIO: 10.33
BH CV XLRA - RV BASE: 3.3 CM
BH CV XLRA - RV LENGTH: 6.1 CM
BH CV XLRA - RV MID: 2.7 CM
BH CV XLRA - TDI S': 19.4 CM/SEC
BILIRUB SERPL-MCNC: 0.3 MG/DL (ref 0–1.2)
BUN SERPL-MCNC: 18 MG/DL (ref 8–23)
BUN/CREAT SERPL: 17.8 (ref 7–25)
CALCIUM SPEC-SCNC: 8.7 MG/DL (ref 8.6–10.5)
CHLORIDE SERPL-SCNC: 106 MMOL/L (ref 98–107)
CO2 SERPL-SCNC: 20.3 MMOL/L (ref 22–29)
CREAT SERPL-MCNC: 1.01 MG/DL (ref 0.57–1)
DEPRECATED RDW RBC AUTO: 41.7 FL (ref 37–54)
ERYTHROCYTE [DISTWIDTH] IN BLOOD BY AUTOMATED COUNT: 13.2 % (ref 12.3–15.4)
FERRITIN SERPL-MCNC: 6661 NG/ML (ref 13–150)
GFR SERPL CREATININE-BSD FRML MDRD: 53 ML/MIN/1.73
GLOBULIN UR ELPH-MCNC: 2.9 GM/DL
GLUCOSE BLDC GLUCOMTR-MCNC: 169 MG/DL (ref 70–130)
GLUCOSE BLDC GLUCOMTR-MCNC: 182 MG/DL (ref 70–130)
GLUCOSE BLDC GLUCOMTR-MCNC: 196 MG/DL (ref 70–130)
GLUCOSE BLDC GLUCOMTR-MCNC: 229 MG/DL (ref 70–130)
GLUCOSE SERPL-MCNC: 183 MG/DL (ref 65–99)
HCT VFR BLD AUTO: 34.2 % (ref 34–46.6)
HGB BLD-MCNC: 11.6 G/DL (ref 12–15.9)
LEFT ATRIUM VOLUME INDEX: 22.1 ML/M2
LYMPHOCYTES # BLD MANUAL: 1.32 10*3/MM3 (ref 0.7–3.1)
LYMPHOCYTES NFR BLD MANUAL: 24.2 % (ref 19.6–45.3)
LYMPHOCYTES NFR BLD MANUAL: 6.1 % (ref 5–12)
Lab: NORMAL
MCH RBC QN AUTO: 29.6 PG (ref 26.6–33)
MCHC RBC AUTO-ENTMCNC: 33.9 G/DL (ref 31.5–35.7)
MCV RBC AUTO: 87.2 FL (ref 79–97)
METHYLMALONATE SERPL-SCNC: 226 NMOL/L (ref 0–378)
MONOCYTES # BLD AUTO: 0.33 10*3/MM3 (ref 0.1–0.9)
NEUTROPHILS # BLD AUTO: 3.76 10*3/MM3 (ref 1.7–7)
NEUTROPHILS NFR BLD MANUAL: 68.7 % (ref 42.7–76)
PLAT MORPH BLD: NORMAL
PLATELET # BLD AUTO: 114 10*3/MM3 (ref 140–450)
PMV BLD AUTO: 11 FL (ref 6–12)
POTASSIUM SERPL-SCNC: 4.2 MMOL/L (ref 3.5–5.2)
PROT SERPL-MCNC: 5.9 G/DL (ref 6–8.5)
RBC # BLD AUTO: 3.92 10*6/MM3 (ref 3.77–5.28)
RBC MORPH BLD: NORMAL
SMUDGE CELLS BLD QL SMEAR: NORMAL
SODIUM SERPL-SCNC: 138 MMOL/L (ref 136–145)
WBC # BLD AUTO: 5.47 10*3/MM3 (ref 3.4–10.8)

## 2020-07-22 PROCEDURE — 99221 1ST HOSP IP/OBS SF/LOW 40: CPT | Performed by: NURSE PRACTITIONER

## 2020-07-22 PROCEDURE — 63710000001 INSULIN GLARGINE PER 5 UNITS: Performed by: HOSPITALIST

## 2020-07-22 PROCEDURE — 93306 TTE W/DOPPLER COMPLETE: CPT | Performed by: INTERNAL MEDICINE

## 2020-07-22 PROCEDURE — 85025 COMPLETE CBC W/AUTO DIFF WBC: CPT | Performed by: HOSPITALIST

## 2020-07-22 PROCEDURE — 93010 ELECTROCARDIOGRAM REPORT: CPT | Performed by: INTERNAL MEDICINE

## 2020-07-22 PROCEDURE — 80053 COMPREHEN METABOLIC PANEL: CPT | Performed by: INTERNAL MEDICINE

## 2020-07-22 PROCEDURE — 93306 TTE W/DOPPLER COMPLETE: CPT

## 2020-07-22 PROCEDURE — 25010000002 PERFLUTREN (DEFINITY) 8.476 MG IN SODIUM CHLORIDE 0.9 % 10 ML INJECTION: Performed by: NURSE PRACTITIONER

## 2020-07-22 PROCEDURE — 82962 GLUCOSE BLOOD TEST: CPT

## 2020-07-22 PROCEDURE — 99231 SBSQ HOSP IP/OBS SF/LOW 25: CPT | Performed by: INTERNAL MEDICINE

## 2020-07-22 PROCEDURE — 85007 BL SMEAR W/DIFF WBC COUNT: CPT | Performed by: HOSPITALIST

## 2020-07-22 PROCEDURE — 63710000001 INSULIN LISPRO (HUMAN) PER 5 UNITS: Performed by: NURSE PRACTITIONER

## 2020-07-22 PROCEDURE — 93005 ELECTROCARDIOGRAM TRACING: CPT | Performed by: HOSPITALIST

## 2020-07-22 PROCEDURE — 82728 ASSAY OF FERRITIN: CPT | Performed by: INTERNAL MEDICINE

## 2020-07-22 RX ORDER — DILTIAZEM HYDROCHLORIDE 5 MG/ML
10 INJECTION INTRAVENOUS ONCE
Status: COMPLETED | OUTPATIENT
Start: 2020-07-22 | End: 2020-07-22

## 2020-07-22 RX ORDER — ATENOLOL 25 MG/1
25 TABLET ORAL
Status: DISCONTINUED | OUTPATIENT
Start: 2020-07-22 | End: 2020-07-23 | Stop reason: HOSPADM

## 2020-07-22 RX ORDER — CEPHALEXIN 500 MG/1
500 CAPSULE ORAL EVERY 8 HOURS SCHEDULED
Status: DISCONTINUED | OUTPATIENT
Start: 2020-07-22 | End: 2020-07-23 | Stop reason: HOSPADM

## 2020-07-22 RX ADMIN — FLUTICASONE PROPIONATE 1 SPRAY: 50 SPRAY, METERED NASAL at 08:01

## 2020-07-22 RX ADMIN — INSULIN LISPRO 2 UNITS: 100 INJECTION, SOLUTION INTRAVENOUS; SUBCUTANEOUS at 12:35

## 2020-07-22 RX ADMIN — SODIUM CHLORIDE, PRESERVATIVE FREE 10 ML: 5 INJECTION INTRAVENOUS at 08:13

## 2020-07-22 RX ADMIN — PANTOPRAZOLE SODIUM 40 MG: 40 TABLET, DELAYED RELEASE ORAL at 06:26

## 2020-07-22 RX ADMIN — AMLODIPINE BESYLATE 5 MG: 5 TABLET ORAL at 08:00

## 2020-07-22 RX ADMIN — LATANOPROST 1 DROP: 50 SOLUTION OPHTHALMIC at 08:01

## 2020-07-22 RX ADMIN — INSULIN LISPRO 2 UNITS: 100 INJECTION, SOLUTION INTRAVENOUS; SUBCUTANEOUS at 08:00

## 2020-07-22 RX ADMIN — DILTIAZEM HYDROCHLORIDE 10 MG: 5 INJECTION INTRAVENOUS at 08:12

## 2020-07-22 RX ADMIN — SODIUM CHLORIDE, PRESERVATIVE FREE 10 ML: 5 INJECTION INTRAVENOUS at 20:49

## 2020-07-22 RX ADMIN — INSULIN LISPRO 4 UNITS: 100 INJECTION, SOLUTION INTRAVENOUS; SUBCUTANEOUS at 17:26

## 2020-07-22 RX ADMIN — ASPIRIN 81 MG: 81 TABLET, COATED ORAL at 08:00

## 2020-07-22 RX ADMIN — ATENOLOL 25 MG: 25 TABLET ORAL at 17:27

## 2020-07-22 RX ADMIN — CEPHALEXIN 500 MG: 500 CAPSULE ORAL at 22:32

## 2020-07-22 RX ADMIN — PERFLUTREN 2 ML: 6.52 INJECTION, SUSPENSION INTRAVENOUS at 13:58

## 2020-07-22 RX ADMIN — INSULIN GLARGINE 8 UNITS: 100 INJECTION, SOLUTION SUBCUTANEOUS at 20:49

## 2020-07-23 ENCOUNTER — READMISSION MANAGEMENT (OUTPATIENT)
Dept: CALL CENTER | Facility: HOSPITAL | Age: 80
End: 2020-07-23

## 2020-07-23 VITALS
HEART RATE: 65 BPM | OXYGEN SATURATION: 98 % | HEIGHT: 62 IN | SYSTOLIC BLOOD PRESSURE: 128 MMHG | BODY MASS INDEX: 25.96 KG/M2 | TEMPERATURE: 98 F | DIASTOLIC BLOOD PRESSURE: 70 MMHG | RESPIRATION RATE: 16 BRPM | WEIGHT: 141.09 LBS

## 2020-07-23 LAB
ANION GAP SERPL CALCULATED.3IONS-SCNC: 9.1 MMOL/L (ref 5–15)
BACTERIA SPEC AEROBE CULT: NORMAL
BACTERIA SPEC AEROBE CULT: NORMAL
BUN SERPL-MCNC: 16 MG/DL (ref 8–23)
BUN/CREAT SERPL: 20.8 (ref 7–25)
CALCIUM SPEC-SCNC: 8.4 MG/DL (ref 8.6–10.5)
CHLORIDE SERPL-SCNC: 107 MMOL/L (ref 98–107)
CO2 SERPL-SCNC: 21.9 MMOL/L (ref 22–29)
CREAT SERPL-MCNC: 0.77 MG/DL (ref 0.57–1)
DEPRECATED RDW RBC AUTO: 42.5 FL (ref 37–54)
ERYTHROCYTE [DISTWIDTH] IN BLOOD BY AUTOMATED COUNT: 13.3 % (ref 12.3–15.4)
GFR SERPL CREATININE-BSD FRML MDRD: 72 ML/MIN/1.73
GLUCOSE BLDC GLUCOMTR-MCNC: 120 MG/DL (ref 70–130)
GLUCOSE SERPL-MCNC: 124 MG/DL (ref 65–99)
HCT VFR BLD AUTO: 32.9 % (ref 34–46.6)
HGB BLD-MCNC: 10.8 G/DL (ref 12–15.9)
MCH RBC QN AUTO: 28.7 PG (ref 26.6–33)
MCHC RBC AUTO-ENTMCNC: 32.8 G/DL (ref 31.5–35.7)
MCV RBC AUTO: 87.5 FL (ref 79–97)
PLATELET # BLD AUTO: 151 10*3/MM3 (ref 140–450)
PMV BLD AUTO: 11.3 FL (ref 6–12)
POTASSIUM SERPL-SCNC: 3.6 MMOL/L (ref 3.5–5.2)
RBC # BLD AUTO: 3.76 10*6/MM3 (ref 3.77–5.28)
SODIUM SERPL-SCNC: 138 MMOL/L (ref 136–145)
WBC # BLD AUTO: 7.11 10*3/MM3 (ref 3.4–10.8)

## 2020-07-23 PROCEDURE — 82962 GLUCOSE BLOOD TEST: CPT

## 2020-07-23 PROCEDURE — 80048 BASIC METABOLIC PNL TOTAL CA: CPT | Performed by: HOSPITALIST

## 2020-07-23 PROCEDURE — 99232 SBSQ HOSP IP/OBS MODERATE 35: CPT | Performed by: INTERNAL MEDICINE

## 2020-07-23 PROCEDURE — 85027 COMPLETE CBC AUTOMATED: CPT | Performed by: HOSPITALIST

## 2020-07-23 RX ORDER — ATENOLOL 25 MG/1
25 TABLET ORAL
Qty: 30 TABLET | Refills: 1 | Status: SHIPPED | OUTPATIENT
Start: 2020-07-24 | End: 2021-01-14

## 2020-07-23 RX ORDER — CEPHALEXIN 500 MG/1
500 CAPSULE ORAL EVERY 8 HOURS SCHEDULED
Qty: 4 CAPSULE | Refills: 0 | Status: SHIPPED | OUTPATIENT
Start: 2020-07-23 | End: 2020-07-25

## 2020-07-23 RX ORDER — LOSARTAN POTASSIUM 100 MG/1
100 TABLET ORAL DAILY
Qty: 30 TABLET | Refills: 1 | Status: SHIPPED | OUTPATIENT
Start: 2020-07-23 | End: 2020-08-11 | Stop reason: ALTCHOICE

## 2020-07-23 RX ADMIN — CEPHALEXIN 500 MG: 500 CAPSULE ORAL at 06:31

## 2020-07-23 RX ADMIN — CEPHALEXIN 500 MG: 500 CAPSULE ORAL at 11:39

## 2020-07-23 RX ADMIN — APIXABAN 5 MG: 5 TABLET, FILM COATED ORAL at 11:39

## 2020-07-23 RX ADMIN — AMLODIPINE BESYLATE 5 MG: 5 TABLET ORAL at 09:21

## 2020-07-23 RX ADMIN — LATANOPROST 1 DROP: 50 SOLUTION OPHTHALMIC at 09:21

## 2020-07-23 RX ADMIN — SODIUM CHLORIDE, PRESERVATIVE FREE 10 ML: 5 INJECTION INTRAVENOUS at 09:21

## 2020-07-23 RX ADMIN — PANTOPRAZOLE SODIUM 40 MG: 40 TABLET, DELAYED RELEASE ORAL at 06:31

## 2020-07-23 RX ADMIN — FLUTICASONE PROPIONATE 1 SPRAY: 50 SPRAY, METERED NASAL at 09:21

## 2020-07-23 RX ADMIN — ATENOLOL 25 MG: 25 TABLET ORAL at 09:21

## 2020-07-23 NOTE — OUTREACH NOTE
Prep Survey      Responses   [facility] facility patient discharged from?  [place]   Is LACE score < 7 ?  No   Eligibility  [facility]   Date of Admission  07/18/20   Date of Discharge  07/23/20   Discharge Disposition  Home or Self Care   Discharge diagnosis  [facility] Acute UTI Sepsis   COVID-19 Test Status  Negative   Does the patient have one of the following disease processes/diagnoses(primary or secondary)?  Sepsis   Does the patient have Home health ordered?  No   Is there a DME ordered?  No   Prep survey completed?  Yes          Jillian Acosta RN

## 2020-07-24 ENCOUNTER — TRANSITIONAL CARE MANAGEMENT TELEPHONE ENCOUNTER (OUTPATIENT)
Dept: CALL CENTER | Facility: HOSPITAL | Age: 80
End: 2020-07-24

## 2020-07-24 NOTE — OUTREACH NOTE
Call Center TCM Note      Responses   [facility] patient discharged from?  [place]   COVID-19 Test Status  Negative   Does the patient have one of the following disease processes/diagnoses(primary or secondary)?  Sepsis   TCM attempt successful?  No   Unsuccessful attempts  Attempt 1          Ashley Russell RN    7/24/2020, 14:04

## 2020-07-26 ENCOUNTER — TRANSITIONAL CARE MANAGEMENT TELEPHONE ENCOUNTER (OUTPATIENT)
Dept: CALL CENTER | Facility: HOSPITAL | Age: 80
End: 2020-07-26

## 2020-07-26 NOTE — OUTREACH NOTE
Call Center TCM Note      Responses   [facility] patient discharged from?  [place]   COVID-19 Test Status  Negative   Does the patient have one of the following disease processes/diagnoses(primary or secondary)?  Sepsis   TCM attempt successful?  No   Unsuccessful attempts  Attempt 3          Tricia Gusman RN    7/26/2020, 12:29

## 2020-08-03 ENCOUNTER — READMISSION MANAGEMENT (OUTPATIENT)
Dept: CALL CENTER | Facility: HOSPITAL | Age: 80
End: 2020-08-03

## 2020-08-03 NOTE — OUTREACH NOTE
Sepsis Week 2 Survey      Responses   [facility] patient discharged from?  [place]   COVID-19 Test Status  Negative   Does the patient have one of the following disease processes/diagnoses(primary or secondary)?  Sepsis   Week 2 attempt successful?  No   Unsuccessful attempts  Attempt 1   Rescheduled  Rescheduled-pt requested [Patient was asleep.  Family member states to call back]          Georgia Serna RN

## 2020-08-06 ENCOUNTER — READMISSION MANAGEMENT (OUTPATIENT)
Dept: CALL CENTER | Facility: HOSPITAL | Age: 80
End: 2020-08-06

## 2020-08-06 ENCOUNTER — OFFICE VISIT (OUTPATIENT)
Dept: INTERNAL MEDICINE | Facility: CLINIC | Age: 80
End: 2020-08-06

## 2020-08-06 VITALS
TEMPERATURE: 98.4 F | HEIGHT: 62 IN | DIASTOLIC BLOOD PRESSURE: 82 MMHG | BODY MASS INDEX: 24.29 KG/M2 | HEART RATE: 67 BPM | OXYGEN SATURATION: 97 % | WEIGHT: 132 LBS | SYSTOLIC BLOOD PRESSURE: 162 MMHG

## 2020-08-06 DIAGNOSIS — R31.9 HEMATURIA, UNSPECIFIED TYPE: Primary | ICD-10-CM

## 2020-08-06 DIAGNOSIS — R42 DIZZINESS: ICD-10-CM

## 2020-08-06 DIAGNOSIS — I10 ESSENTIAL HYPERTENSION: ICD-10-CM

## 2020-08-06 DIAGNOSIS — R74.8 ELEVATED LIVER ENZYMES: ICD-10-CM

## 2020-08-06 LAB
BILIRUB BLD-MCNC: NEGATIVE MG/DL
CLARITY, POC: ABNORMAL
COLOR UR: ABNORMAL
GLUCOSE UR STRIP-MCNC: NEGATIVE MG/DL
KETONES UR QL: NEGATIVE
LEUKOCYTE EST, POC: NEGATIVE
NITRITE UR-MCNC: NEGATIVE MG/ML
PH UR: 5 [PH] (ref 5–8)
PROT UR STRIP-MCNC: ABNORMAL MG/DL
RBC # UR STRIP: ABNORMAL /UL
SP GR UR: 1 (ref 1–1.03)
UROBILINOGEN UR QL: NORMAL

## 2020-08-06 PROCEDURE — 81003 URINALYSIS AUTO W/O SCOPE: CPT | Performed by: NURSE PRACTITIONER

## 2020-08-06 PROCEDURE — 99214 OFFICE O/P EST MOD 30 MIN: CPT | Performed by: NURSE PRACTITIONER

## 2020-08-06 RX ORDER — AMLODIPINE BESYLATE 5 MG/1
5 TABLET ORAL DAILY
Qty: 30 TABLET | Refills: 1 | Status: SHIPPED | OUTPATIENT
Start: 2020-08-06 | End: 2021-11-30 | Stop reason: SDUPTHER

## 2020-08-06 NOTE — OUTREACH NOTE
Sepsis Week 2 Survey      Responses   [facility] patient discharged from?  [place]   COVID-19 Test Status  Negative   Does the patient have one of the following disease processes/diagnoses(primary or secondary)?  Sepsis   Week 2 attempt successful?  No   Unsuccessful attempts  Attempt 2          Pauline Martin LPN

## 2020-08-06 NOTE — OUTREACH NOTE
Sepsis Week 2 Survey      Responses   [facility] patient discharged from?  [place]   COVID-19 Test Status  Negative   Does the patient have one of the following disease processes/diagnoses(primary or secondary)?  Sepsis   Week 2 attempt successful?  No   Unsuccessful attempts  Attempt 2          Pauline Martin LPN

## 2020-08-06 NOTE — PROGRESS NOTES
Subjective   Antonietta Gramajo is a 80 y.o. female. Patient is here today for   Chief Complaint   Patient presents with   • Urinary Tract Infection     Pt here to follow up on ER visit for a UTI.    .    History of Present Illness   New problem to this provider: Patient is here to follow-up on a hospital admission.  She was at [facility] from 7/18-23/2020.  She had a UTI and sepsis.  Her last dose of antibiotic was on the 25th (cephalexin).  Denies any symptoms at this time.  She really did not have any urinary symptoms, but was fatigued and confused.  Her BP is elevated - seeing cardiology next week. Stopped amlodipine in the hospital because atenolol was added and physician was concerned patient's BP may decrease with the addition of medication.  Patient is concerned that she may have had a possible TIA due to trouble with her checkbook, calculations.  States that when she is sticking her tongue out, it goes to the left. She did not mention any of this when she was in the hospital.  Denies any other symptoms, weakness.  She is currently on Eliquis due to having a run on her atrial fib when she was in the hospital    States that her blood sugar has been as low as 60-70 in the morning. She does see endocrinology for her diabetes.     The following portions of the patient's history were reviewed and updated as appropriate: allergies, current medications, past family history, past medical history, past social history, past surgical history and problem list.    Review of Systems   Constitutional: Negative.    Respiratory: Negative.    Cardiovascular: Negative.    Genitourinary: Negative.    Psychiatric/Behavioral: Negative.        Objective   Vitals:    08/06/20 1404   BP: 162/82   Pulse: 67   Temp: 98.4 °F (36.9 °C)   SpO2: 97%     Body mass index is 23.98 kg/m².  Physical Exam   Constitutional: She is oriented to person, place, and time. Vital signs are normal. She appears well-developed and well-nourished.    HENT:   Right Ear: Tympanic membrane and ear canal normal.   Left Ear: Tympanic membrane and ear canal normal.   Neck: Carotid bruit is not present.   Cardiovascular: Normal rate, regular rhythm and normal heart sounds.   Pulses:       Carotid pulses are 2+ on the right side, and 2+ on the left side.  Pulmonary/Chest: Effort normal and breath sounds normal.   Neurological: She is alert and oriented to person, place, and time. She has normal strength. No cranial nerve deficit or sensory deficit.   Skin: Skin is warm, dry and intact.   Psychiatric: She has a normal mood and affect. Her speech is normal and behavior is normal. Thought content normal.   Nursing note and vitals reviewed.    Results for orders placed or performed in visit on 08/06/20   Comprehensive Metabolic Panel   Result Value Ref Range    Glucose 194 (H) 65 - 99 mg/dL    BUN 17 8 - 23 mg/dL    Creatinine 1.34 (H) 0.57 - 1.00 mg/dL    eGFR Non African Am 38 (L) >60 mL/min/1.73    eGFR  Am 46 (L) >60 mL/min/1.73    BUN/Creatinine Ratio 12.7 7.0 - 25.0    Sodium 139 136 - 145 mmol/L    Potassium 3.9 3.5 - 5.2 mmol/L    Chloride 102 98 - 107 mmol/L    Total CO2 24.9 22.0 - 29.0 mmol/L    Calcium 9.3 8.6 - 10.5 mg/dL    Total Protein 6.9 6.0 - 8.5 g/dL    Albumin 4.30 3.50 - 5.20 g/dL    Globulin 2.6 gm/dL    A/G Ratio 1.7 g/dL    Total Bilirubin 0.4 0.0 - 1.2 mg/dL    Alkaline Phosphatase 94 39 - 117 U/L    AST (SGOT) 18 1 - 32 U/L    ALT (SGPT) 18 1 - 33 U/L   POCT urinalysis dipstick, automated   Result Value Ref Range    Color Dark Yellow Yellow, Straw, Dark Yellow, Leila    Clarity, UA Cloudy (A) Clear    Specific Gravity  1.005 1.005 - 1.030    pH, Urine 5.0 5.0 - 8.0    Leukocytes Negative Negative    Nitrite, UA Negative Negative    Protein, POC 1+ (A) Negative mg/dL    Glucose, UA Negative Negative, 1000 mg/dL (3+) mg/dL    Ketones, UA Negative Negative    Urobilinogen, UA Normal Normal    Bilirubin Negative Negative    Blood, UA Trace  (A) Negative       Reviewed discharge summary and labs from her hospital admission.     Assessment/Plan   Antonietta was seen today for urinary tract infection.    Diagnoses and all orders for this visit:    Hematuria, unspecified type  -     Urine Culture - Urine, Urine, Clean Catch  -     POCT urinalysis dipstick, automated    Elevated liver enzymes  -     Comprehensive Metabolic Panel    Essential hypertension  -     amLODIPine (NORVASC) 5 MG tablet; Take 1 tablet by mouth Daily.  -     Duplex Carotid Ultrasound CAR; Future    Dizziness  -     Duplex Carotid Ultrasound CAR; Future    HTN - will resume amlodipine at 5 mg daily. She has an appt with cardiology next week.     Hematuria -continues with hematuria, so will check urine culture    Possible TIA - Patient does not want an MRI at this time. She had an echocardiogram in the hospital and was started on Eliquis. The next test would be to check her carotid arteries.  Instructed patient that if she felt like she was having TIA or stroke symptoms, that she would need to go to the emergency room.  Verbalizes understanding.    Hypoglycemia - she needs to f/u with endocrinology.

## 2020-08-07 DIAGNOSIS — N28.9 FUNCTION KIDNEY DECREASED: Primary | ICD-10-CM

## 2020-08-07 LAB
ALBUMIN SERPL-MCNC: 4.3 G/DL (ref 3.5–5.2)
ALBUMIN/GLOB SERPL: 1.7 G/DL
ALP SERPL-CCNC: 94 U/L (ref 39–117)
ALT SERPL-CCNC: 18 U/L (ref 1–33)
AST SERPL-CCNC: 18 U/L (ref 1–32)
BILIRUB SERPL-MCNC: 0.4 MG/DL (ref 0–1.2)
BUN SERPL-MCNC: 17 MG/DL (ref 8–23)
BUN/CREAT SERPL: 12.7 (ref 7–25)
CALCIUM SERPL-MCNC: 9.3 MG/DL (ref 8.6–10.5)
CHLORIDE SERPL-SCNC: 102 MMOL/L (ref 98–107)
CO2 SERPL-SCNC: 24.9 MMOL/L (ref 22–29)
CREAT SERPL-MCNC: 1.34 MG/DL (ref 0.57–1)
GLOBULIN SER CALC-MCNC: 2.6 GM/DL
GLUCOSE SERPL-MCNC: 194 MG/DL (ref 65–99)
POTASSIUM SERPL-SCNC: 3.9 MMOL/L (ref 3.5–5.2)
PROT SERPL-MCNC: 6.9 G/DL (ref 6–8.5)
SODIUM SERPL-SCNC: 139 MMOL/L (ref 136–145)

## 2020-08-08 LAB
BACTERIA UR CULT: NORMAL
BACTERIA UR CULT: NORMAL

## 2020-08-10 RX ORDER — CEPHALEXIN 500 MG/1
500 CAPSULE ORAL 2 TIMES DAILY
Qty: 14 CAPSULE | Refills: 0 | Status: SHIPPED | OUTPATIENT
Start: 2020-08-10 | End: 2020-08-11

## 2020-08-10 NOTE — PROGRESS NOTES
Date of Office Visit: 2020  Encounter Provider: WALTER Ferreira  Place of Service: [facility] CARDIOLOGY  Patient Name: Antonietta Gramajo  :1940    Chief Complaint   Patient presents with   • Hypertension   • Follow-up   :     HPI: Antonietta Gramajo is a 80 y.o. female, new to me, who presents today for follow-up.  Old records have been obtained and reviewed by me.  She is a patient with a past medical history significant for diabetes and hypertension.  In 2019, she was evaluated in our office for chest pain.  It was recommended she undergo a stress echocardiogram; however, it appears this was never completed.  She has not been seen in our office since.     On 2020, she presented to the ED with a high fever, chills, and myalgias.  The etiology of her fever appeared to be urinary.  She was also leukopenic.  The COVID test was negative.  During the hospitalization, she went into atrial fibrillation for which we were consulted.  She converted spontaneously to sinus rhythm after a one-time dose of IV diltiazem.  She was started atenolol.  Echocardiogram revealed normal LV function with an EF of 69%, mild aortic stenosis, mild mitral regurgitation, and mild tricuspid regurgitation with an elevated RVSP of 37 mmHg.  Due to CHADS2-VASc score of 4, it was felt the patient would benefit from anticoagulation even if the atrial fibrillation was in the setting of acute illness and she was started on Eliquis.  On 2020, she was stable for discharge.  She is here today for follow-up.   Overall she has been doing well.  She denies any chest pain, palpitations, dizziness, or syncope.  She denies any bleeding difficulties or melena.  She will occasionally feel her heart beating hard.  She is reporting headaches about once or twice a week.  She is wondering if there is anything other than Tylenol that she can take.  She has mild shortness of breath on exertion, especially  when walking up stairs.  This is unchanged.  She also has mild and chronic bilateral lower extremity swelling.  She attributes this to a history of foot and ankle surgery following an accident.  There is confusion regarding some of her medications.  When she was discharged from the hospital, she was changed from losartan_HCTZ to losartan due to hyponatremia.  Her amlodipine was also discontinued.  Evidently she saw her primary care doctor a few days ago and was told to resume the amlodipine.  However, she has not yet done this because she first wanted our opinion.      Past Medical History:   Diagnosis Date   • Cataract    • Chest pain    • Diverticulitis    • Essential hypertension    • Fibromyalgia    • Hyperlipidemia    • Mild intermittent asthma without complication    • Osteoarthritis of multiple joints    • Skin cancer    • Type 2 diabetes mellitus (CMS/[facility])        Past Surgical History:   Procedure Laterality Date   • ANKLE SURGERY     • CATARACT EXTRACTION     • CHOLECYSTECTOMY     • CLEFT PALATE REPAIR     • FOOT SURGERY     • HYSTERECTOMY     • TONSILLECTOMY         Social History     Socioeconomic History   • Marital status:      Spouse name: Not on file   • Number of children: Not on file   • Years of education: Not on file   • Highest education level: Not on file   Tobacco Use   • Smoking status: Never Smoker   • Smokeless tobacco: Never Used   Substance and Sexual Activity   • Alcohol use: No   • Drug use: No   Lifestyle   • Physical activity:     Days per week: Patient refused     Minutes per session: Patient refused   • Stress: Not on file       Family History   Problem Relation Age of Onset   • Stroke Mother    • Diabetes Mother    • Heart attack Father    • Breast cancer Sister    • Hypertension Sister    • Lung cancer Brother    • Hypertension Brother    • Heart attack Brother        Review of Systems   Constitution: Negative for chills, fever and malaise/fatigue.   Cardiovascular: Positive  for dyspnea on exertion and leg swelling. Negative for chest pain, near-syncope, orthopnea, palpitations, paroxysmal nocturnal dyspnea and syncope.   Respiratory: Negative for cough and shortness of breath.    Musculoskeletal: Negative for joint pain, joint swelling and myalgias.   Gastrointestinal: Negative for abdominal pain, diarrhea, melena, nausea and vomiting.   Genitourinary: Negative for frequency and hematuria.   Neurological: Positive for headaches. Negative for light-headedness, numbness, paresthesias and seizures.   Allergic/Immunologic: Negative.    All other systems reviewed and are negative.      Allergies   Allergen Reactions   • Atorvastatin Other (See Comments)   • Cycloset [Bromocriptine] Other (See Comments)     Sores in mouth and fatigue   • Invokana [Canagliflozin]      rash   • Oxycodone-Acetaminophen    • Propoxyphene-Acetaminophen    • Sulfa Antibiotics    • Victoza [Liraglutide] Rash         Current Outpatient Medications:   •  albuterol (PROAIR HFA) 108 (90 Base) MCG/ACT inhaler, Inhale 1 puff Every 4 (Four) Hours As Needed for Wheezing., Disp: 1 inhaler, Rfl: 1  •  amLODIPine (NORVASC) 5 MG tablet, Take 1 tablet by mouth Daily., Disp: 30 tablet, Rfl: 1  •  apixaban (ELIQUIS) 5 MG tablet tablet, Take 1 tablet by mouth Every 12 (Twelve) Hours. Indications: Atrial Fibrillation, Disp: 60 tablet, Rfl: 1  •  atenolol (TENORMIN) 25 MG tablet, Take 1 tablet by mouth Daily., Disp: 30 tablet, Rfl: 1  •  cyclobenzaprine (FLEXERIL) 10 MG tablet, Take 10 mg by mouth 3 (Three) Times a Day As Needed for Muscle Spasms., Disp: , Rfl:   •  glucose blood test strip, Check blood glucose 3-4 times dialy, Disp: 100 each, Rfl: 4  •  Insulin Pen Needle (B-D UF III MINI PEN NEEDLES) 31G X 5 MM misc, Use once daily, Disp: 100 each, Rfl: 3  •  JANUVIA 100 MG tablet, Take 1 tablet by mouth Daily., Disp: 30 tablet, Rfl: 5  •  latanoprost (XALATAN) 0.005 % ophthalmic solution, , Disp: , Rfl:   •   "losartan-hydrochlorothiazide (HYZAAR) 100-25 MG per tablet, Take 1 tablet by mouth Daily., Disp: , Rfl:   •  metFORMIN (GLUCOPHAGE) 1000 MG tablet, Take 1 tablet by mouth 2 (Two) Times a Day With Meals., Disp: 60 tablet, Rfl: 5  •  Nabumetone 1000 MG tablet, Take  by mouth 2 (two) times a day., Disp: , Rfl:   •  Omega-3 Fatty Acids (FISH OIL) 1000 MG capsule capsule, Take 2 capsules by mouth 2 (Two) Times a Day With Meals., Disp: 240 capsule, Rfl: 4  •  Omeprazole ([employer] OMEPRAZOLE) 20 MG tablet delayed-release, Take  by mouth., Disp: , Rfl:   •  ONETOUCH DELICA LANCETS 33G misc, Check blood glucose 3 times daily DX CODE: e11.65, Disp: 100 each, Rfl: 5  •  TRESIBA FLEXTOUCH 200 UNIT/ML solution pen-injector pen injection, Inject 24 Units under the skin into the appropriate area as directed Daily., Disp: 15 mL, Rfl: 5  •  Triamcinolone Acetonide (NASACORT) 55 MCG/ACT nasal inhaler, 2 sprays into each nostril daily., Disp: , Rfl:       Objective:     Vitals:    08/11/20 1102 08/11/20 1128   BP: 152/96 158/100   BP Location: Right arm Left arm   Patient Position: Sitting Sitting   Pulse: 88    Weight: 59.9 kg (132 lb)    Height: 158 cm (62.2\")      Body mass index is 23.99 kg/m².    PHYSICAL EXAM:    Physical Exam   Constitutional: She is oriented to person, place, and time. She appears well-developed and well-nourished. No distress.   HENT:   Head: Normocephalic and atraumatic.   Eyes: Pupils are equal, round, and reactive to light.   Neck: No JVD present. No thyromegaly present.   Cardiovascular: Normal rate, regular rhythm, normal heart sounds and intact distal pulses.   No murmur heard.  Pulmonary/Chest: Effort normal and breath sounds normal. No respiratory distress.   Abdominal: Soft. Bowel sounds are normal. She exhibits no distension. There is no splenomegaly or hepatomegaly. There is no tenderness.   Musculoskeletal: Normal range of motion. She exhibits edema (BLE).   Neurological: She is alert and oriented to " person, place, and time.   Skin: Skin is warm and dry. She is not diaphoretic. No erythema.   Psychiatric: She has a normal mood and affect. Her behavior is normal. Judgment normal.         ECG 12 Lead  Date/Time: 8/11/2020 11:31 AM  Performed by: Masha Quintana APRN  Authorized by: Masha Quintana APRN   Comparison: compared with previous ECG from 7/22/2020  Similar to previous ECG  Rhythm: sinus rhythm  Rate: normal  BPM: 88    Clinical impression: normal ECG  Comments: Indication: Atrial fibrillation              Assessment:       Diagnosis Plan   1. Paroxysmal atrial fibrillation (CMS/[facility])  ECG 12 Lead   2. Essential hypertension       Orders Placed This Encounter   Procedures   • ECG 12 Lead     This order was created via procedure documentation          Plan:       1.  Paroxysmal atrial fibrillation.  She is in sinus rhythm today.  She has an elevated CHADS2-VASc score of 5 and is anticoagulated on Eliquis.  She has normal LV function.      2.  Hypertension.  Her blood pressure is elevated today.  The patient is not exactly sure if she is taking the losartan-HCTZ or just the losartan.  I have asked her and her son to call later with clarification on this.  If she is still taking the losartan-HCTZ, I would recommend switching to the losartan only due to recent hyponatremia.  Ultimately, if this is going to be managed by her PCP and they wish for her to stay on the losartan-HCTZ, I will leave that up to them.  I also encouraged her to resume the amlodipine as previously instructed by her PCP.      Overall I think she is stable and doing well.  She denies any symptoms of angina or heart failure.  Unfortunately, I would not recommend taking anything besides Tylenol for her headaches.  At least not anything with blood thinning properties.  I advised her to follow-up with her PCP for her headaches.  She will follow-up with Dr. Page in 1 month or sooner if needed.      As always, it has been a pleasure to  participate in your patient's care.      Sincerely,         WALTER Seth

## 2020-08-11 ENCOUNTER — OFFICE VISIT (OUTPATIENT)
Dept: CARDIOLOGY | Facility: CLINIC | Age: 80
End: 2020-08-11

## 2020-08-11 ENCOUNTER — TELEPHONE (OUTPATIENT)
Dept: CARDIOLOGY | Facility: CLINIC | Age: 80
End: 2020-08-11

## 2020-08-11 VITALS
DIASTOLIC BLOOD PRESSURE: 100 MMHG | WEIGHT: 132 LBS | SYSTOLIC BLOOD PRESSURE: 158 MMHG | HEART RATE: 88 BPM | BODY MASS INDEX: 24.29 KG/M2 | HEIGHT: 62 IN

## 2020-08-11 DIAGNOSIS — I10 ESSENTIAL HYPERTENSION: ICD-10-CM

## 2020-08-11 DIAGNOSIS — I48.0 PAROXYSMAL ATRIAL FIBRILLATION (HCC): Primary | ICD-10-CM

## 2020-08-11 PROCEDURE — 99214 OFFICE O/P EST MOD 30 MIN: CPT | Performed by: NURSE PRACTITIONER

## 2020-08-11 PROCEDURE — 93000 ELECTROCARDIOGRAM COMPLETE: CPT | Performed by: NURSE PRACTITIONER

## 2020-08-11 RX ORDER — CEPHALEXIN 500 MG/1
500 CAPSULE ORAL 2 TIMES DAILY
Qty: 14 CAPSULE | Refills: 0 | Status: SHIPPED | OUTPATIENT
Start: 2020-08-11 | End: 2020-09-30

## 2020-08-11 RX ORDER — LOSARTAN POTASSIUM AND HYDROCHLOROTHIAZIDE 25; 100 MG/1; MG/1
1 TABLET ORAL DAILY
COMMUNITY
End: 2020-09-30

## 2020-08-11 NOTE — TELEPHONE ENCOUNTER
Pt's son called back and left message stating she is taking losartan 100 mg daily not the losartan with hctz.

## 2020-08-21 ENCOUNTER — RESULTS ENCOUNTER (OUTPATIENT)
Dept: INTERNAL MEDICINE | Facility: CLINIC | Age: 80
End: 2020-08-21

## 2020-08-21 DIAGNOSIS — N28.9 FUNCTION KIDNEY DECREASED: ICD-10-CM

## 2020-09-30 ENCOUNTER — OFFICE VISIT (OUTPATIENT)
Dept: CARDIOLOGY | Facility: CLINIC | Age: 80
End: 2020-09-30

## 2020-09-30 VITALS
TEMPERATURE: 97.8 F | BODY MASS INDEX: 24.66 KG/M2 | WEIGHT: 134 LBS | HEIGHT: 62 IN | OXYGEN SATURATION: 99 % | SYSTOLIC BLOOD PRESSURE: 122 MMHG | DIASTOLIC BLOOD PRESSURE: 78 MMHG | HEART RATE: 71 BPM

## 2020-09-30 DIAGNOSIS — Z79.4 TYPE 2 DIABETES MELLITUS WITHOUT COMPLICATION, WITH LONG-TERM CURRENT USE OF INSULIN (HCC): ICD-10-CM

## 2020-09-30 DIAGNOSIS — I48.0 AF (PAROXYSMAL ATRIAL FIBRILLATION) (HCC): ICD-10-CM

## 2020-09-30 DIAGNOSIS — I48.0 PAROXYSMAL ATRIAL FIBRILLATION (HCC): Primary | ICD-10-CM

## 2020-09-30 DIAGNOSIS — E11.9 TYPE 2 DIABETES MELLITUS WITHOUT COMPLICATION, WITH LONG-TERM CURRENT USE OF INSULIN (HCC): ICD-10-CM

## 2020-09-30 DIAGNOSIS — I10 ESSENTIAL HYPERTENSION: ICD-10-CM

## 2020-09-30 PROCEDURE — 99214 OFFICE O/P EST MOD 30 MIN: CPT | Performed by: INTERNAL MEDICINE

## 2020-09-30 PROCEDURE — 93000 ELECTROCARDIOGRAM COMPLETE: CPT | Performed by: INTERNAL MEDICINE

## 2020-09-30 RX ORDER — LOSARTAN POTASSIUM 100 MG/1
100 TABLET ORAL DAILY
COMMUNITY
End: 2021-10-29 | Stop reason: SDUPTHER

## 2020-10-06 PROBLEM — E11.3293 NONPROLIFERATIVE DIABETIC RETINOPATHY OF BOTH EYES (HCC): Status: ACTIVE | Noted: 2017-04-10

## 2020-10-06 RX ORDER — NABUMETONE 750 MG/1
TABLET, FILM COATED ORAL
Qty: 60 TABLET | Refills: 4 | OUTPATIENT
Start: 2020-10-06

## 2020-11-20 RX ORDER — NABUMETONE 750 MG/1
TABLET, FILM COATED ORAL
Qty: 60 TABLET | Refills: 4 | OUTPATIENT
Start: 2020-11-20

## 2021-01-06 DIAGNOSIS — Z79.4 TYPE 2 DIABETES MELLITUS WITH OTHER SPECIFIED COMPLICATION, WITH LONG-TERM CURRENT USE OF INSULIN (HCC): ICD-10-CM

## 2021-01-06 DIAGNOSIS — E55.9 VITAMIN D DEFICIENCY: ICD-10-CM

## 2021-01-06 DIAGNOSIS — E11.69 TYPE 2 DIABETES MELLITUS WITH OTHER SPECIFIED COMPLICATION, WITH LONG-TERM CURRENT USE OF INSULIN (HCC): ICD-10-CM

## 2021-01-06 DIAGNOSIS — I10 ESSENTIAL HYPERTENSION: Primary | ICD-10-CM

## 2021-01-06 DIAGNOSIS — E78.2 MIXED HYPERLIPIDEMIA: ICD-10-CM

## 2021-01-08 LAB
25(OH)D3+25(OH)D2 SERPL-MCNC: 28.6 NG/ML (ref 30–100)
ALBUMIN SERPL-MCNC: 4.4 G/DL (ref 3.5–5.2)
ALBUMIN/CREAT UR: <16 MG/G CREAT (ref 0–29)
ALBUMIN/GLOB SERPL: 1.5 G/DL
ALP SERPL-CCNC: 105 U/L (ref 39–117)
ALT SERPL-CCNC: 17 U/L (ref 1–33)
AST SERPL-CCNC: 19 U/L (ref 1–32)
BILIRUB SERPL-MCNC: 0.5 MG/DL (ref 0–1.2)
BUN SERPL-MCNC: 21 MG/DL (ref 8–23)
BUN/CREAT SERPL: 22.3 (ref 7–25)
C PEPTIDE SERPL-MCNC: 0.8 NG/ML (ref 1.1–4.4)
CALCIUM SERPL-MCNC: 10 MG/DL (ref 8.6–10.5)
CHLORIDE SERPL-SCNC: 102 MMOL/L (ref 98–107)
CHOLEST SERPL-MCNC: 203 MG/DL (ref 0–200)
CO2 SERPL-SCNC: 27.1 MMOL/L (ref 22–29)
CREAT SERPL-MCNC: 0.94 MG/DL (ref 0.57–1)
CREAT UR-MCNC: 18.5 MG/DL
GLOBULIN SER CALC-MCNC: 2.9 GM/DL
GLUCOSE SERPL-MCNC: 139 MG/DL (ref 65–99)
HBA1C MFR BLD: 7.7 % (ref 4.8–5.6)
HDLC SERPL-MCNC: 79 MG/DL (ref 40–60)
INTERPRETATION: NORMAL
LDLC SERPL CALC-MCNC: 99 MG/DL (ref 0–100)
Lab: NORMAL
MICROALBUMIN UR-MCNC: <3 UG/ML
POTASSIUM SERPL-SCNC: 3.7 MMOL/L (ref 3.5–5.2)
PROT SERPL-MCNC: 7.3 G/DL (ref 6–8.5)
SODIUM SERPL-SCNC: 139 MMOL/L (ref 136–145)
TRIGL SERPL-MCNC: 144 MG/DL (ref 0–150)
VLDLC SERPL CALC-MCNC: 25 MG/DL (ref 5–40)

## 2021-01-14 ENCOUNTER — OFFICE VISIT (OUTPATIENT)
Dept: ENDOCRINOLOGY | Age: 81
End: 2021-01-14

## 2021-01-14 VITALS
WEIGHT: 141.2 LBS | BODY MASS INDEX: 28.47 KG/M2 | HEIGHT: 59 IN | SYSTOLIC BLOOD PRESSURE: 140 MMHG | DIASTOLIC BLOOD PRESSURE: 62 MMHG

## 2021-01-14 DIAGNOSIS — E78.2 MIXED HYPERLIPIDEMIA: ICD-10-CM

## 2021-01-14 DIAGNOSIS — E55.9 VITAMIN D DEFICIENCY: ICD-10-CM

## 2021-01-14 DIAGNOSIS — Z79.4 TYPE 2 DIABETES MELLITUS WITH OTHER SPECIFIED COMPLICATION, WITH LONG-TERM CURRENT USE OF INSULIN (HCC): Primary | ICD-10-CM

## 2021-01-14 DIAGNOSIS — E11.69 TYPE 2 DIABETES MELLITUS WITH OTHER SPECIFIED COMPLICATION, WITH LONG-TERM CURRENT USE OF INSULIN (HCC): Primary | ICD-10-CM

## 2021-01-14 DIAGNOSIS — I10 ESSENTIAL HYPERTENSION: ICD-10-CM

## 2021-01-14 PROCEDURE — 99214 OFFICE O/P EST MOD 30 MIN: CPT | Performed by: NURSE PRACTITIONER

## 2021-01-14 RX ORDER — SITAGLIPTIN 100 MG/1
100 TABLET, FILM COATED ORAL DAILY
Qty: 90 TABLET | Refills: 1 | Status: SHIPPED | OUTPATIENT
Start: 2021-01-14 | End: 2021-11-30 | Stop reason: SDUPTHER

## 2021-01-14 RX ORDER — INSULIN DEGLUDEC 200 U/ML
24 INJECTION, SOLUTION SUBCUTANEOUS DAILY
Qty: 15 ML | Refills: 5 | Status: SHIPPED | OUTPATIENT
Start: 2021-01-14 | End: 2021-11-30 | Stop reason: SDUPTHER

## 2021-01-14 NOTE — PROGRESS NOTES
"  Subjective    Antonietta Gramajo is a 80 y.o. female. she is here today for follow-up.  Chief Complaint   Patient presents with   • Diabetes     type 2 diabetes, recent labs, checks BS once a day, no readings, eye [id number]     Blood pressure 140/62, height 149.9 cm (59\"), weight 64 kg (141 lb 3.2 oz).      History of Present Illness   Encounter Diagnoses   Name Primary?   • Type 2 diabetes mellitus with other specified complication, with long-term current use of insulin (CMS/formerly [facility]) Yes   • Mixed hyperlipidemia    • Vitamin D deficiency    • Essential hypertension    This is an 80-year-old female here today for 6-month follow-up for the above diagnoses.  She has no acute complaints at this visit.  She reports checking her blood glucose once per day, but did not bring her monitor with her today.  She reports blood sugars ranging between 80 and 200 most of the time, with very seldom readings less than 70.  Reports she feels the best when her blood sugar is around 140.  She denies any peripheral neuropathy.  She does note some lower extremity edema from time to time.  She sees an eye specialist 2 times per year for macular degeneration.  She denies seeing a podiatrist, denies any feet changes/issues. She needs her medications refilled at this visit.  We will have her follow-up in 6 months with labs prior.    The following portions of the patient's history were reviewed and updated as appropriate:   Past Medical History:   Diagnosis Date   • Cataract    • Chest pain    • Diverticulitis    • Essential hypertension    • Fibromyalgia    • Hyperlipidemia    • Mild intermittent asthma without complication    • Osteoarthritis of multiple joints    • Skin cancer    • Type 2 diabetes mellitus (CMS/formerly [facility])      Past Surgical History:   Procedure Laterality Date   • ANKLE SURGERY     • CATARACT EXTRACTION     • CHOLECYSTECTOMY     • CLEFT PALATE REPAIR     • FOOT SURGERY     • HYSTERECTOMY     • TONSILLECTOMY       Family History "   Problem Relation Age of Onset   • Stroke Mother    • Diabetes Mother    • Heart attack Father    • Breast cancer Sister    • Hypertension Sister    • Lung cancer Brother    • Hypertension Brother    • Heart attack Brother      OB History    No obstetric history on file.       Current Outpatient Medications   Medication Sig Dispense Refill   • albuterol (PROAIR HFA) 108 (90 Base) MCG/ACT inhaler Inhale 1 puff Every 4 (Four) Hours As Needed for Wheezing. 1 inhaler 1   • amLODIPine (NORVASC) 5 MG tablet Take 1 tablet by mouth Daily. 30 tablet 1   • apixaban (ELIQUIS) 5 MG tablet tablet Take 1 tablet by mouth Every 12 (Twelve) Hours. Indications: Atrial Fibrillation 60 tablet 1   • atenolol (TENORMIN) 25 MG tablet Take 1 tablet by mouth Daily. 30 tablet 1   • cyclobenzaprine (FLEXERIL) 10 MG tablet Take 10 mg by mouth 3 (Three) Times a Day As Needed for Muscle Spasms.     • glucose blood test strip Check blood glucose 3-4 times dialy 100 each 4   • Insulin Pen Needle (B-D UF III MINI PEN NEEDLES) 31G X 5 MM misc Use once daily 100 each 3   • JANUVIA 100 MG tablet Take 1 tablet by mouth Daily. 30 tablet 5   • latanoprost (XALATAN) 0.005 % ophthalmic solution      • losartan (COZAAR) 100 MG tablet Take 100 mg by mouth Daily.     • metFORMIN (GLUCOPHAGE) 1000 MG tablet Take 1 tablet by mouth 2 (Two) Times a Day With Meals. 60 tablet 5   • Nabumetone 1000 MG tablet Take  by mouth 2 (two) times a day.     • Omega-3 Fatty Acids (FISH OIL) 1000 MG capsule capsule Take 2 capsules by mouth 2 (Two) Times a Day With Meals. 240 capsule 4   • Omeprazole ([employer]S OMEPRAZOLE) 20 MG tablet delayed-release Take  by mouth.     • ONETOUCH DELICA LANCETS 33G misc Check blood glucose 3 times daily DX CODE: e11.65 100 each 5   • TRESIBA FLEXTOUCH 200 UNIT/ML solution pen-injector pen injection Inject 24 Units under the skin into the appropriate area as directed Daily. 15 mL 5   • Triamcinolone Acetonide (NASACORT) 55 MCG/ACT nasal inhaler 2  "sprays into each nostril daily.       No current facility-administered medications for this visit.      Allergies   Allergen Reactions   • Atorvastatin Other (See Comments)   • Cycloset [Bromocriptine] Other (See Comments)     Sores in mouth and fatigue   • Invokana [Canagliflozin]      rash   • Oxycodone-Acetaminophen    • Propoxyphene-Acetaminophen    • Sulfa Antibiotics    • Victoza [Liraglutide] Rash     Social History     Socioeconomic History   • Marital status:      Spouse name: Not on file   • Number of children: Not on file   • Years of education: Not on file   • Highest education level: Not on file   Tobacco Use   • Smoking status: Never Smoker   • Smokeless tobacco: Never Used   Substance and Sexual Activity   • Alcohol use: No   • Drug use: No   • Sexual activity: Defer   Lifestyle   • Physical activity     Days per week: Patient refused     Minutes per session: Patient refused   • Stress: Not on file       Review of Systems  Review of Systems   Constitutional: Negative for appetite change and fatigue.   Eyes: Positive for visual disturbance (pain in left eye).   Respiratory: Negative for cough.    Cardiovascular: Negative for leg swelling.   Gastrointestinal: Negative for constipation and diarrhea.   Endocrine: Negative for cold intolerance, heat intolerance, polydipsia, polyphagia and polyuria.   Genitourinary: Negative for frequency.   Neurological: Negative for numbness.        Objective    /62   Ht 149.9 cm (59\")   Wt 64 kg (141 lb 3.2 oz)   BMI 28.52 kg/m²     Physical Exam  Vitals signs and nursing note reviewed.   Constitutional:       General: She is not in acute distress.     Appearance: Normal appearance. She is well-developed and normal weight. She is not diaphoretic.   HENT:      Head: Normocephalic and atraumatic.      Right Ear: External ear normal.      Left Ear: External ear normal.      Nose: Nose normal.      Mouth/Throat:      Pharynx: No oropharyngeal exudate.   Eyes: "      General:         Right eye: No discharge.         Left eye: No discharge.      Pupils: Pupils are equal, round, and reactive to light.   Neck:      Musculoskeletal: Full passive range of motion without pain, normal range of motion and neck supple. No edema or erythema.      Thyroid: No thyroid mass or thyromegaly.      Vascular: No carotid bruit.      Trachea: Trachea normal. No tracheal tenderness or tracheal deviation.   Cardiovascular:      Rate and Rhythm: Normal rate and regular rhythm.      Heart sounds: Normal heart sounds. No murmur. No friction rub. No gallop.    Pulmonary:      Effort: Pulmonary effort is normal. No respiratory distress.      Breath sounds: Normal breath sounds. No stridor. No wheezing or rales.   Abdominal:      General: Bowel sounds are normal. There is no distension.      Palpations: Abdomen is soft.   Musculoskeletal: Normal range of motion.         General: No deformity.      Right lower leg: Edema present.      Left lower leg: Edema present.   Feet:      Right foot:      Skin integrity: Dry skin present. No ulcer, skin breakdown, callus or fissure.      Left foot:      Skin integrity: Dry skin present. No ulcer, skin breakdown, callus or fissure.      Comments: No peripheral neuropathy noted  Lymphadenopathy:      Cervical: No cervical adenopathy.   Skin:     General: Skin is warm and dry.      Coloration: Skin is not pale.      Findings: No erythema or rash.   Neurological:      Mental Status: She is alert and oriented to person, place, and time.   Psychiatric:         Behavior: Behavior normal.         Thought Content: Thought content normal.         Judgment: Judgment normal.         Lab Review  Glucose (mg/dL)   Date Value   07/23/2020 124 (H)   07/22/2020 183 (H)   07/20/2020 138 (H)     Sodium (mmol/L)   Date Value   01/07/2021 139   08/06/2020 139   07/23/2020 138   07/22/2020 138   07/20/2020 131 (L)     Potassium (mmol/L)   Date Value   01/07/2021 3.7   08/06/2020 3.9    07/23/2020 3.6   07/22/2020 4.2   07/20/2020 3.4 (L)     Chloride (mmol/L)   Date Value   01/07/2021 102   08/06/2020 102   07/23/2020 107   07/22/2020 106   07/20/2020 103     CO2 (mmol/L)   Date Value   07/23/2020 21.9 (L)   07/22/2020 20.3 (L)   07/20/2020 18.0 (L)     Total CO2 (mmol/L)   Date Value   01/07/2021 27.1   08/06/2020 24.9     BUN (mg/dL)   Date Value   01/07/2021 21   08/06/2020 17   07/23/2020 16   07/22/2020 18   07/20/2020 16     Creatinine (mg/dL)   Date Value   01/07/2021 0.94   08/06/2020 1.34 (H)   07/23/2020 0.77   07/22/2020 1.01 (H)   07/20/2020 0.93     Hemoglobin A1C (%)   Date Value   01/07/2021 7.70 (H)   07/19/2020 9.10 (H)   07/07/2020 8.80 (H)   12/05/2019 8.90 (H)     Triglycerides (mg/dL)   Date Value   01/07/2021 144   07/07/2020 216 (H)   12/05/2019 207 (H)     LDL Cholesterol  (mg/dL)   Date Value   07/07/2020 83   12/05/2019 78   05/10/2019 76     LDL Chol Calc (NIH) (mg/dL)   Date Value   01/07/2021 99       Assessment/Plan      Encounter Diagnoses   Name Primary?   • Type 2 diabetes mellitus with other specified complication, with long-term current use of insulin (CMS/[facility]) Yes   • Mixed hyperlipidemia    • Vitamin D deficiency    • Essential hypertension          Medications prescribed:  Outpatient Encounter Medications as of 1/14/2021   Medication Sig Dispense Refill   • albuterol (PROAIR HFA) 108 (90 Base) MCG/ACT inhaler Inhale 1 puff Every 4 (Four) Hours As Needed for Wheezing. 1 inhaler 1   • amLODIPine (NORVASC) 5 MG tablet Take 1 tablet by mouth Daily. 30 tablet 1   • apixaban (ELIQUIS) 5 MG tablet tablet Take 1 tablet by mouth Every 12 (Twelve) Hours. Indications: Atrial Fibrillation 60 tablet 1   • atenolol (TENORMIN) 25 MG tablet Take 1 tablet by mouth Daily. 30 tablet 1   • cyclobenzaprine (FLEXERIL) 10 MG tablet Take 10 mg by mouth 3 (Three) Times a Day As Needed for Muscle Spasms.     • glucose blood test strip Check blood glucose 3-4 times dialy 100 each 4    • Insulin Pen Needle (B-D UF III MINI PEN NEEDLES) 31G X 5 MM misc Use once daily 100 each 3   • JANUVIA 100 MG tablet Take 1 tablet by mouth Daily. 30 tablet 5   • latanoprost (XALATAN) 0.005 % ophthalmic solution      • losartan (COZAAR) 100 MG tablet Take 100 mg by mouth Daily.     • metFORMIN (GLUCOPHAGE) 1000 MG tablet Take 1 tablet by mouth 2 (Two) Times a Day With Meals. 60 tablet 5   • Nabumetone 1000 MG tablet Take  by mouth 2 (two) times a day.     • Omega-3 Fatty Acids (FISH OIL) 1000 MG capsule capsule Take 2 capsules by mouth 2 (Two) Times a Day With Meals. 240 capsule 4   • Omeprazole ([employer]S OMEPRAZOLE) 20 MG tablet delayed-release Take  by mouth.     • ONETOUCH DELICA LANCETS 33G misc Check blood glucose 3 times daily DX CODE: e11.65 100 each 5   • TRESIBA FLEXTOUCH 200 UNIT/ML solution pen-injector pen injection Inject 24 Units under the skin into the appropriate area as directed Daily. 15 mL 5   • Triamcinolone Acetonide (NASACORT) 55 MCG/ACT nasal inhaler 2 sprays into each nostril daily.       No facility-administered encounter medications on file as of 1/14/2021.        Orders placed during this encounter include:  No orders of the defined types were placed in this encounter.    Is an 80-year-old  female seen today for 6-month follow-up regarding the above diagnoses.  Metabolically stable.  Hyperlipidemia, diabetes type 2, hypertension, all well controlled and will continue current treatment.  Labs reviewed and discussed with patient.  Vitamin D remains low, will double her over-the-counter dosage.  Patient educated to monitor for blood sugars less than 70 and treat with 15 g of simple sugar every 15 minutes. Patient is at risk for hypoglycemia being on insulin. Medication refills have been sent to her pharmacy per her request. We will follow-up in 6 months with labs prior. Blood pressure overall satisfactory on losartan and amlodipine. She will continue on her current diabetes  medications as prescribed

## 2021-01-27 ENCOUNTER — OFFICE VISIT (OUTPATIENT)
Dept: CARDIOLOGY | Facility: CLINIC | Age: 81
End: 2021-01-27

## 2021-01-27 VITALS
DIASTOLIC BLOOD PRESSURE: 62 MMHG | BODY MASS INDEX: 28.43 KG/M2 | WEIGHT: 141 LBS | HEIGHT: 59 IN | SYSTOLIC BLOOD PRESSURE: 140 MMHG | HEART RATE: 76 BPM

## 2021-01-27 DIAGNOSIS — I48.0 PAROXYSMAL ATRIAL FIBRILLATION (HCC): Primary | ICD-10-CM

## 2021-01-27 DIAGNOSIS — I10 ESSENTIAL HYPERTENSION: ICD-10-CM

## 2021-01-27 DIAGNOSIS — Z79.4 TYPE 2 DIABETES MELLITUS WITH OTHER SPECIFIED COMPLICATION, WITH LONG-TERM CURRENT USE OF INSULIN (HCC): ICD-10-CM

## 2021-01-27 DIAGNOSIS — E11.69 TYPE 2 DIABETES MELLITUS WITH OTHER SPECIFIED COMPLICATION, WITH LONG-TERM CURRENT USE OF INSULIN (HCC): ICD-10-CM

## 2021-01-27 PROCEDURE — 99442 PR PHYS/QHP TELEPHONE EVALUATION 11-20 MIN: CPT | Performed by: INTERNAL MEDICINE

## 2021-01-27 NOTE — PROGRESS NOTES
"PATIENTINFORMATION    Date of Office Visit: 21  Encounter Provider: Audrey Page MD  Place of Service: [facility] CARDIOLOGY  Patient Name: Antonietta Gramajo  : 1940    Subjective:         Patient ID: Antonietta Gramajo is a 80 y.o. female.      History of Present Illness    This is a nice lady that I met in 2019.  At that time, she was complaining about chest pain.  I ordered a stress echo, but she never came into have it.  She was hospitalized in 2020 with urinary tract infection/sepsis/dehydration, was found to be in atrial fibrillation with rapid ventricular rate.  She was placed on atenolol and Eliquis and converted.    We spoke today.  She says she has not been taking the Eliquis.  She is waiting until she sees her eye doctor.  She has macular degeneration and has been having problems with it and wants to speak with her eye doctor before starting the blood thinner.  I encouraged her to do so as quickly as possible.  Otherwise she denies any chest pain, shortness of breath, palpitations.  Her blood pressure has been stable.  She is more nervous about a low blood pressure.     Objective:     /62   Pulse 76   Ht 149.9 cm (59\")   Wt 64 kg (141 lb)   BMI 28.48 kg/m²  Body mass index is 28.48 kg/m².       Lab Review:     Lipid Panel    Lipid Panel 20   Total Cholesterol 182 203 (A)   Triglycerides 216 (A) 144   HDL Cholesterol 56 79 (A)   VLDL Cholesterol 43.2 25   LDL Cholesterol  83 99   (A) Abnormal value       Comments are available for some flowsheets but are not being displayed.             Lab Results   Component Value Date    GLUCOSE 124 (H) 2020    BUN 21 2021    CREATININE 0.94 2021    EGFRIFNONA 57 (L) 2021    EGFRIFAFRI 69 2021    BCR 22.3 2021    K 3.7 2021    CO2 27.1 2021    CALCIUM 10.0 2021    PROTENTOTREF 7.3 2021    ALBUMIN 4.40 2021    LABIL2 1.5 " 01/07/2021    AST 19 01/07/2021    ALT 17 01/07/2021       Assessment/Plan:       1.  Paroxysmal atrial fibrillation.   Atrial Fibrillation and Atrial Flutter  Assessment  • The patient's CHADS2-VASc score is 7  • A [id number] score of 2 or more is considered a high risk for a thromboembolic event  age, being a woman, hypertension, diabetes and possible TIA history.  Hopefully she will be able to start Eliquis 5 mg twice daily soon.  I reviewed her labs including her kidney function and that is the appropriate dose for her.  2.  Hypertension.    I am not going to adjust her medications.  I think her blood pressure is adequately controlled for her age.   3. History of diabetes.  4.  Family history of heart disease but not necessarily premature.     She will follow up with Letty in 6 months.    You have chosen to receive care through a telephone visit today. Do you consent to use a telephone visit for your medical care today? Yes.    Telemedicine visit lasted 12 minutes.    No orders of the defined types were placed in this encounter.       Discharge Medications          Accurate as of January 27, 2021 10:56 AM. If you have any questions, ask your nurse or doctor.            Changes to Medications      Instructions Start Date   apixaban 5 MG tablet tablet  Commonly known as: ELIQUIS  What changed: additional instructions   5 mg, Oral, Every 12 Hours Scheduled         Continue These Medications      Instructions Start Date   albuterol sulfate  (90 Base) MCG/ACT inhaler  Commonly known as: ProAir HFA   1 puff, Inhalation, Every 4 Hours PRN      amLODIPine 5 MG tablet  Commonly known as: NORVASC   5 mg, Oral, Daily      [facility] Omeprazole 20 MG tablet delayed-release  Generic drug: Omeprazole   Oral      glucose blood test strip   Check blood glucose 3-4 times dialy      Insulin Pen Needle 31G X 5 MM misc  Commonly known as: B-D UF III MINI PEN NEEDLES   Use once daily      Januvia 100 MG tablet  Generic drug:  SITagliptin   100 mg, Oral, Daily      latanoprost 0.005 % ophthalmic solution  Commonly known as: XALATAN   1 drop, Both Eyes, Daily      losartan 100 MG tablet  Commonly known as: COZAAR   100 mg, Oral, Daily      metFORMIN 1000 MG tablet  Commonly known as: GLUCOPHAGE   1,000 mg, Oral, Daily With Breakfast      OneTouch Delica Lancets 33G misc   Check blood glucose 3 times daily DX CODE: e11.65      Tresiba FlexTouch 200 UNIT/ML solution pen-injector pen injection  Generic drug: Insulin Degludec   24 Units, Subcutaneous, Daily      Triamcinolone Acetonide 55 MCG/ACT nasal inhaler  Commonly known as: NASACORT   2 sprays, Nasal, Daily                    Audrey Page MD  01/27/21  10:56 EST

## 2021-01-27 NOTE — PROGRESS NOTES
Currently holding Eliquis due to eye issues      Palps no  SOA no  Edema yes ankles lower legs  Lightheaded no  Chest Pain no  Fatigue no

## 2021-02-03 ENCOUNTER — IMMUNIZATION (OUTPATIENT)
Dept: VACCINE CLINIC | Facility: HOSPITAL | Age: 81
End: 2021-02-03

## 2021-02-03 PROCEDURE — 91300 HC SARSCOV02 VAC 30MCG/0.3ML IM: CPT | Performed by: INTERNAL MEDICINE

## 2021-02-03 PROCEDURE — 0001A: CPT | Performed by: INTERNAL MEDICINE

## 2021-02-24 ENCOUNTER — IMMUNIZATION (OUTPATIENT)
Dept: VACCINE CLINIC | Facility: HOSPITAL | Age: 81
End: 2021-02-24

## 2021-02-24 PROCEDURE — 91300 HC SARSCOV02 VAC 30MCG/0.3ML IM: CPT | Performed by: INTERNAL MEDICINE

## 2021-02-24 PROCEDURE — 0002A: CPT | Performed by: INTERNAL MEDICINE

## 2021-04-27 RX ORDER — LOSARTAN POTASSIUM AND HYDROCHLOROTHIAZIDE 25; 100 MG/1; MG/1
TABLET ORAL
Qty: 90 TABLET | Refills: 0 | OUTPATIENT
Start: 2021-04-27

## 2021-04-30 RX ORDER — LOSARTAN POTASSIUM AND HYDROCHLOROTHIAZIDE 25; 100 MG/1; MG/1
TABLET ORAL
Qty: 90 TABLET | Refills: 2 | OUTPATIENT
Start: 2021-04-30

## 2021-06-16 DIAGNOSIS — IMO0002 UNCONTROLLED TYPE 2 DIABETES MELLITUS WITH COMPLICATION, WITHOUT LONG-TERM CURRENT USE OF INSULIN: ICD-10-CM

## 2021-06-16 RX ORDER — FLURBIPROFEN SODIUM 0.3 MG/ML
SOLUTION/ DROPS OPHTHALMIC
Qty: 100 EACH | Refills: 2 | OUTPATIENT
Start: 2021-06-16

## 2021-09-13 DIAGNOSIS — IMO0002 UNCONTROLLED TYPE 2 DIABETES MELLITUS WITH COMPLICATION, WITHOUT LONG-TERM CURRENT USE OF INSULIN: ICD-10-CM

## 2021-09-14 RX ORDER — FLURBIPROFEN SODIUM 0.3 MG/ML
SOLUTION/ DROPS OPHTHALMIC
Qty: 30 EACH | Refills: 3 | Status: SHIPPED | OUTPATIENT
Start: 2021-09-14 | End: 2022-01-25 | Stop reason: SDUPTHER

## 2021-10-29 ENCOUNTER — TELEPHONE (OUTPATIENT)
Dept: ENDOCRINOLOGY | Age: 81
End: 2021-10-29

## 2021-10-29 DIAGNOSIS — I10 ESSENTIAL HYPERTENSION: Primary | ICD-10-CM

## 2021-10-29 RX ORDER — LOSARTAN POTASSIUM 100 MG/1
100 TABLET ORAL DAILY
Qty: 30 TABLET | Refills: 0 | Status: SHIPPED | OUTPATIENT
Start: 2021-10-29 | End: 2021-11-30 | Stop reason: SDUPTHER

## 2021-10-29 NOTE — TELEPHONE ENCOUNTER
Patient called    She said she needs her Losartan refilled. I told her we don;t usually fill that medication and she said Dr. Holley did. Can we send a one time order to the pharmacy?    She has not had it all year    Can we call her back with a status update?

## 2021-11-30 ENCOUNTER — OFFICE VISIT (OUTPATIENT)
Dept: ENDOCRINOLOGY | Age: 81
End: 2021-11-30

## 2021-11-30 VITALS
HEIGHT: 59 IN | HEART RATE: 95 BPM | OXYGEN SATURATION: 98 % | WEIGHT: 149.4 LBS | DIASTOLIC BLOOD PRESSURE: 70 MMHG | SYSTOLIC BLOOD PRESSURE: 180 MMHG | BODY MASS INDEX: 30.12 KG/M2 | RESPIRATION RATE: 20 BRPM

## 2021-11-30 DIAGNOSIS — Z79.4 TYPE 2 DIABETES MELLITUS WITH OTHER SPECIFIED COMPLICATION, WITH LONG-TERM CURRENT USE OF INSULIN (HCC): Primary | ICD-10-CM

## 2021-11-30 DIAGNOSIS — F43.21 DYSFUNCTIONAL GRIEVING: ICD-10-CM

## 2021-11-30 DIAGNOSIS — E11.69 TYPE 2 DIABETES MELLITUS WITH OTHER SPECIFIED COMPLICATION, WITH LONG-TERM CURRENT USE OF INSULIN (HCC): Primary | ICD-10-CM

## 2021-11-30 DIAGNOSIS — I10 ESSENTIAL HYPERTENSION: ICD-10-CM

## 2021-11-30 PROCEDURE — 99215 OFFICE O/P EST HI 40 MIN: CPT | Performed by: INTERNAL MEDICINE

## 2021-11-30 RX ORDER — LOSARTAN POTASSIUM 100 MG/1
100 TABLET ORAL DAILY
Qty: 90 TABLET | Refills: 3 | Status: SHIPPED | OUTPATIENT
Start: 2021-11-30 | End: 2021-12-03

## 2021-11-30 RX ORDER — AMLODIPINE BESYLATE 5 MG/1
5 TABLET ORAL DAILY
Qty: 30 TABLET | Refills: 5 | Status: SHIPPED | OUTPATIENT
Start: 2021-11-30 | End: 2021-12-17 | Stop reason: SDUPTHER

## 2021-11-30 RX ORDER — SITAGLIPTIN 100 MG/1
100 TABLET, FILM COATED ORAL DAILY
Qty: 90 TABLET | Refills: 1 | Status: SHIPPED | OUTPATIENT
Start: 2021-11-30 | End: 2022-01-25

## 2021-11-30 RX ORDER — INSULIN DEGLUDEC 200 U/ML
24 INJECTION, SOLUTION SUBCUTANEOUS DAILY
Qty: 15 ML | Refills: 5 | Status: SHIPPED | OUTPATIENT
Start: 2021-11-30 | End: 2023-04-04 | Stop reason: SDUPTHER

## 2021-12-01 ENCOUNTER — CLINICAL SUPPORT (OUTPATIENT)
Dept: INTERNAL MEDICINE | Facility: CLINIC | Age: 81
End: 2021-12-01

## 2021-12-01 VITALS — DIASTOLIC BLOOD PRESSURE: 98 MMHG | SYSTOLIC BLOOD PRESSURE: 190 MMHG

## 2021-12-01 NOTE — PROGRESS NOTES
Patient was in for Blood Pressure check only. She has been off hypertensive meds for 6-12 months. She was given Edarbi 40mg in the office and advised to take amlodipine today when she got it from the pharmacy . Tomorrow she will continue with the normal medications. (per verbal instructions from Dr. Catherine.) She will return to the office in two days to see Dr. Catherine.

## 2021-12-03 ENCOUNTER — OFFICE VISIT (OUTPATIENT)
Dept: INTERNAL MEDICINE | Facility: CLINIC | Age: 81
End: 2021-12-03

## 2021-12-03 VITALS
HEIGHT: 59 IN | WEIGHT: 149 LBS | HEART RATE: 113 BPM | TEMPERATURE: 97.1 F | DIASTOLIC BLOOD PRESSURE: 100 MMHG | SYSTOLIC BLOOD PRESSURE: 180 MMHG | OXYGEN SATURATION: 97 % | BODY MASS INDEX: 30.04 KG/M2

## 2021-12-03 DIAGNOSIS — I10 ESSENTIAL HYPERTENSION: Primary | ICD-10-CM

## 2021-12-03 DIAGNOSIS — J45.20 MILD INTERMITTENT ASTHMA WITHOUT COMPLICATION: ICD-10-CM

## 2021-12-03 PROCEDURE — 99214 OFFICE O/P EST MOD 30 MIN: CPT | Performed by: FAMILY MEDICINE

## 2021-12-03 RX ORDER — LISINOPRIL 40 MG/1
40 TABLET ORAL DAILY
Qty: 30 TABLET | Refills: 1 | Status: SHIPPED | OUTPATIENT
Start: 2021-12-03 | End: 2021-12-03 | Stop reason: SDUPTHER

## 2021-12-03 RX ORDER — LISINOPRIL 40 MG/1
40 TABLET ORAL DAILY
Qty: 30 TABLET | Refills: 1 | Status: SHIPPED | OUTPATIENT
Start: 2021-12-03 | End: 2021-12-17 | Stop reason: SDUPTHER

## 2021-12-03 NOTE — PROGRESS NOTES
Subjective   Antonietta Gramajo is a 81 y.o. female.   Chief Complaint   Patient presents with   • Hypertension   • Asthma       History of Present Illness     Pt was last seen in our office in 6/2019.    #1 HTN-she was off blood pressure medications for months.  Yesterday she started losartan at 100 mg a day and amlodipine at 5 mg a day.  Blood pressure still elevated at 180/100.  She had a very difficult year. She lost 6 family members in a year, including her  and 2 sisters.  She did not do any grieving counseling yet.  Creatinine is at 0.94, GFR 57.    #2 asthma-she uses albuterol as needed only.  Last time she used it last week.  She has cough occasionally.  It is dry.  She takes albuterol as needed.  She has no wheezing.  Occasionally she gets short of breath. Albuterol helps.  AST at 21.  No Flu vaccine this season.  She had Prevnar 13 in 9/2017.  Pneumovax in 2007.    The following portions of the patient's history were reviewed and updated as appropriate: allergies, current medications, past medical history, past social history and problem list.    Review of Systems   Constitutional: Negative.    Respiratory: Positive for cough. Negative for wheezing.    Cardiovascular: Negative for chest pain.         Objective   Wt Readings from Last 3 Encounters:   12/03/21 67.6 kg (149 lb)   11/30/21 67.8 kg (149 lb 6.4 oz)   01/27/21 64 kg (141 lb)      Vitals:    12/03/21 0906   BP: (!) 188/100   Pulse: 113   Temp: 97.1 °F (36.2 °C)   SpO2: 97%     Temp Readings from Last 3 Encounters:   12/03/21 97.1 °F (36.2 °C)   09/30/20 97.8 °F (36.6 °C) (Infrared)   08/06/20 98.4 °F (36.9 °C)     BP Readings from Last 3 Encounters:   12/03/21 (!) 188/100   12/01/21 (!) 190/98   11/30/21 180/70     Pulse Readings from Last 3 Encounters:   12/03/21 113   11/30/21 95   01/27/21 76     Body mass index is 30.08 kg/m².    Physical Exam  Constitutional:       Appearance: Normal appearance. She is well-developed.   HENT:      Head:  Normocephalic and atraumatic.   Neck:      Vascular: No carotid bruit.   Cardiovascular:      Rate and Rhythm: Normal rate and regular rhythm.      Heart sounds: Normal heart sounds.   Pulmonary:      Effort: Pulmonary effort is normal.      Breath sounds: Normal breath sounds.   Musculoskeletal:      Cervical back: Neck supple.   Skin:     General: Skin is warm and dry.   Neurological:      Mental Status: She is alert and oriented to person, place, and time.   Psychiatric:         Behavior: Behavior normal.         Assessment/Plan   Diagnoses and all orders for this visit:    1. Essential hypertension (Primary)    2. Mild intermittent asthma without complication    Other orders  -     Discontinue: lisinopril (PRINIVIL,ZESTRIL) 40 MG tablet; Take 1 tablet by mouth Daily.  Dispense: 30 tablet; Refill: 1  -     lisinopril (PRINIVIL,ZESTRIL) 40 MG tablet; Take 1 tablet by mouth Daily.  Dispense: 30 tablet; Refill: 1        #1 HTN- uncontrolled. We are stopping losartan. We are starting lisinopril 40 mg a day, will increase dose of amlodipine to 10 mg a day.  Follow up in 1 week.  #2 asthma-continue current treatment.  Follow-up in 6 months.  She declines flu vaccine.    Grieving counseling advised.  Patient says that she has it available through her [employer], but she is not ready for it yet.

## 2021-12-06 PROBLEM — F43.21 DYSFUNCTIONAL GRIEVING: Status: ACTIVE | Noted: 2021-12-06

## 2021-12-06 NOTE — ASSESSMENT & PLAN NOTE
Diabetes is worsening.   restart diabetic oral agents and continue insulin  Diabetes will be reassessed in 1 month.

## 2021-12-06 NOTE — ASSESSMENT & PLAN NOTE
Hypertension is worsening.  restart medications today  Blood pressure will be reassessed see Dr. Leyva nurse day after visit and at end of week Max Catherine.    Blood pressure very high today blood pressure was taken 3 times.  Blood pressure was 201/102 also 221/99 also 192/104.  I spoke with her primary care physician Gemini Catherine. I called Dr. Catherine. Dr. Barkley indicates patient has not seen her since 2019.  Blood pressure pills have been prescribed today.  Patient is to take both of them tonight.  Patient agrees to see Dr. Catherine's office for a nurse visit tomorrow at 8:30 AM.  I have made her an appointment with the doctor at 8:30 AM this Friday.

## 2021-12-17 ENCOUNTER — OFFICE VISIT (OUTPATIENT)
Dept: INTERNAL MEDICINE | Facility: CLINIC | Age: 81
End: 2021-12-17

## 2021-12-17 VITALS
SYSTOLIC BLOOD PRESSURE: 160 MMHG | OXYGEN SATURATION: 97 % | HEIGHT: 59 IN | DIASTOLIC BLOOD PRESSURE: 86 MMHG | WEIGHT: 152 LBS | BODY MASS INDEX: 30.64 KG/M2 | HEART RATE: 112 BPM | TEMPERATURE: 98.2 F

## 2021-12-17 DIAGNOSIS — I10 ESSENTIAL HYPERTENSION: Primary | ICD-10-CM

## 2021-12-17 DIAGNOSIS — J45.20 MILD INTERMITTENT ASTHMA WITHOUT COMPLICATION: ICD-10-CM

## 2021-12-17 PROCEDURE — 99214 OFFICE O/P EST MOD 30 MIN: CPT | Performed by: FAMILY MEDICINE

## 2021-12-17 RX ORDER — AMLODIPINE BESYLATE 10 MG/1
10 TABLET ORAL DAILY
Qty: 30 TABLET | Refills: 1 | Status: SHIPPED | OUTPATIENT
Start: 2021-12-17 | End: 2022-01-14 | Stop reason: SDUPTHER

## 2021-12-17 RX ORDER — LISINOPRIL 40 MG/1
40 TABLET ORAL DAILY
Qty: 30 TABLET | Refills: 1 | Status: SHIPPED | OUTPATIENT
Start: 2021-12-17 | End: 2023-01-03 | Stop reason: SDUPTHER

## 2021-12-17 RX ORDER — ALBUTEROL SULFATE 90 UG/1
1 AEROSOL, METERED RESPIRATORY (INHALATION) EVERY 4 HOURS PRN
Qty: 18 G | Refills: 0 | Status: SHIPPED | OUTPATIENT
Start: 2021-12-17 | End: 2023-03-22 | Stop reason: SDUPTHER

## 2021-12-17 NOTE — PROGRESS NOTES
Subjective   Antonietta Gramajo is a 81 y.o. female.   Chief Complaint   Patient presents with   • Hypertension   • Asthma       History of Present Illness     #1  Hypertension-at last office visit we stopped losartan and started patient on lisinopril 40 mg a day.  She takes it every day as prescribed.  We also advised patient to increase dose of amlodipine from 5 to 10 mg a day.  She forgot to do that.  Blood pressure is better but still elevated.  She has no lightheadedness, no chest pain, no shortness of breath.    2.  Asthma -she has intermittent asthma.  She uses albuterol as needed only.  She is requesting refill on albuterol.  She has no problems with breathing, but  albuterol is .    The following portions of the patient's history were reviewed and updated as appropriate: allergies, current medications, past family history, past medical history, past social history, past surgical history and problem list.    Review of Systems   Constitutional: Negative.    Respiratory: Negative.    Cardiovascular: Negative.          Objective   Wt Readings from Last 3 Encounters:   21 68.9 kg (152 lb)   21 67.6 kg (149 lb)   21 67.8 kg (149 lb 6.4 oz)      Vitals:    21 1116   BP: 160/86   Pulse: 112   Temp: 98.2 °F (36.8 °C)   SpO2: 97%     Temp Readings from Last 3 Encounters:   21 98.2 °F (36.8 °C)   21 97.1 °F (36.2 °C)   20 97.8 °F (36.6 °C) (Infrared)     BP Readings from Last 3 Encounters:   21 160/86   21 180/100   21 (!) 190/98     Pulse Readings from Last 3 Encounters:   21 112   21 113   21 95     Body mass index is 30.68 kg/m².    Physical Exam  Constitutional:       Appearance: She is well-developed. She is obese.   HENT:      Head: Normocephalic and atraumatic.   Neck:      Thyroid: No thyromegaly.      Vascular: No carotid bruit.   Cardiovascular:      Rate and Rhythm: Normal rate and regular rhythm.      Heart sounds: Normal  heart sounds.   Pulmonary:      Effort: Pulmonary effort is normal.      Breath sounds: Normal breath sounds.   Musculoskeletal:      Cervical back: Neck supple.   Skin:     General: Skin is warm and dry.   Neurological:      Mental Status: She is alert and oriented to person, place, and time.   Psychiatric:         Behavior: Behavior normal.         Assessment/Plan   Diagnoses and all orders for this visit:    1. Essential hypertension (Primary)    2. Mild intermittent asthma without complication        #1 hypertension-we will continue current dose of lisinopril.  We are increasing dose of amlodipine to 10 mg a day.  Follow-up in 1 month.  BMP today.    2.  Asthma-continue albuterol as needed.  Follow-up in 6 months

## 2021-12-18 LAB
BUN SERPL-MCNC: 17 MG/DL (ref 8–27)
BUN/CREAT SERPL: 19 (ref 12–28)
CALCIUM SERPL-MCNC: 10 MG/DL (ref 8.7–10.3)
CHLORIDE SERPL-SCNC: 100 MMOL/L (ref 96–106)
CO2 SERPL-SCNC: 25 MMOL/L (ref 20–29)
CREAT SERPL-MCNC: 0.91 MG/DL (ref 0.57–1)
GLUCOSE SERPL-MCNC: 166 MG/DL (ref 65–99)
POTASSIUM SERPL-SCNC: 3.7 MMOL/L (ref 3.5–5.2)
SODIUM SERPL-SCNC: 141 MMOL/L (ref 134–144)

## 2022-01-11 ENCOUNTER — LAB (OUTPATIENT)
Dept: ENDOCRINOLOGY | Age: 82
End: 2022-01-11

## 2022-01-11 DIAGNOSIS — E11.69 TYPE 2 DIABETES MELLITUS WITH OTHER SPECIFIED COMPLICATION, WITH LONG-TERM CURRENT USE OF INSULIN: Primary | ICD-10-CM

## 2022-01-11 DIAGNOSIS — I10 ACCELERATED HYPERTENSION: ICD-10-CM

## 2022-01-11 DIAGNOSIS — Z79.4 TYPE 2 DIABETES MELLITUS WITH OTHER SPECIFIED COMPLICATION, WITH LONG-TERM CURRENT USE OF INSULIN: Primary | ICD-10-CM

## 2022-01-12 LAB
ALBUMIN SERPL-MCNC: 4.1 G/DL (ref 3.6–4.6)
ALBUMIN/GLOB SERPL: 1.5 {RATIO} (ref 1.2–2.2)
ALP SERPL-CCNC: 143 IU/L (ref 44–121)
ALT SERPL-CCNC: 14 IU/L (ref 0–32)
AST SERPL-CCNC: 15 IU/L (ref 0–40)
BILIRUB SERPL-MCNC: 0.2 MG/DL (ref 0–1.2)
BUN SERPL-MCNC: 27 MG/DL (ref 8–27)
BUN/CREAT SERPL: 30 (ref 12–28)
CALCIUM SERPL-MCNC: 9.2 MG/DL (ref 8.7–10.3)
CHLORIDE SERPL-SCNC: 105 MMOL/L (ref 96–106)
CHOLEST SERPL-MCNC: 179 MG/DL (ref 100–199)
CO2 SERPL-SCNC: 23 MMOL/L (ref 20–29)
CREAT SERPL-MCNC: 0.89 MG/DL (ref 0.57–1)
GLOBULIN SER CALC-MCNC: 2.7 G/DL (ref 1.5–4.5)
GLUCOSE SERPL-MCNC: 152 MG/DL (ref 65–99)
HBA1C MFR BLD: 10.6 % (ref 4.8–5.6)
HDLC SERPL-MCNC: 70 MG/DL
IMP & REVIEW OF LAB RESULTS: NORMAL
LDLC SERPL CALC-MCNC: 93 MG/DL (ref 0–99)
POTASSIUM SERPL-SCNC: 4.4 MMOL/L (ref 3.5–5.2)
PROT SERPL-MCNC: 6.8 G/DL (ref 6–8.5)
SODIUM SERPL-SCNC: 141 MMOL/L (ref 134–144)
TRIGL SERPL-MCNC: 85 MG/DL (ref 0–149)
TSH SERPL DL<=0.005 MIU/L-ACNC: 0.85 UIU/ML (ref 0.45–4.5)
VLDLC SERPL CALC-MCNC: 16 MG/DL (ref 5–40)

## 2022-01-14 ENCOUNTER — OFFICE VISIT (OUTPATIENT)
Dept: INTERNAL MEDICINE | Facility: CLINIC | Age: 82
End: 2022-01-14

## 2022-01-14 VITALS
DIASTOLIC BLOOD PRESSURE: 70 MMHG | HEIGHT: 59 IN | WEIGHT: 151 LBS | BODY MASS INDEX: 30.44 KG/M2 | SYSTOLIC BLOOD PRESSURE: 145 MMHG | HEART RATE: 116 BPM | OXYGEN SATURATION: 98 % | TEMPERATURE: 96.6 F

## 2022-01-14 DIAGNOSIS — I10 ESSENTIAL HYPERTENSION: Primary | ICD-10-CM

## 2022-01-14 PROBLEM — N39.0 ACUTE UTI: Status: RESOLVED | Noted: 2020-07-19 | Resolved: 2022-01-14

## 2022-01-14 PROBLEM — A41.9 SEPSIS, UNSPECIFIED ORGANISM (HCC): Status: RESOLVED | Noted: 2020-07-19 | Resolved: 2022-01-14

## 2022-01-14 PROCEDURE — 99213 OFFICE O/P EST LOW 20 MIN: CPT | Performed by: FAMILY MEDICINE

## 2022-01-14 RX ORDER — AMLODIPINE BESYLATE 5 MG/1
5 TABLET ORAL 2 TIMES DAILY
Qty: 60 TABLET | Refills: 1 | Status: ON HOLD | OUTPATIENT
Start: 2022-01-14 | End: 2022-12-29 | Stop reason: SDUPTHER

## 2022-01-14 NOTE — PROGRESS NOTES
Subjective   Antonietta Gramajo is a 81 y.o. female.   Chief Complaint   Patient presents with   • Hypertension       History of Present Illness     Pt is here with her daughter who is visiting from [place].    #1 hypertension- patient is on lisinopril 40 mg a day and on amlodipine at 5 mg a day.  We increased dose at last office visit to 10 mg a day, but patient said that she was dizzy and after 5 or 6 days she decreased dose to 5 mg a day.  She did not check blood pressure at that time and she said that she was not lightheaded.  Blood pressure is elevated in the office today.  She has no chest pain, no shortness of breath, no palpitations.    The following portions of the patient's history were reviewed and updated as appropriate: allergies, current medications, past medical history, past social history and problem list.    Review of Systems   Constitutional: Negative.    Respiratory: Negative.    Cardiovascular: Negative.          Objective   Wt Readings from Last 3 Encounters:   01/14/22 68.5 kg (151 lb)   12/17/21 68.9 kg (152 lb)   12/03/21 67.6 kg (149 lb)      Vitals:    01/14/22 1116   BP: 145/70   Pulse: 116   Temp: 96.6 °F (35.9 °C)   SpO2: 98%     Temp Readings from Last 3 Encounters:   01/14/22 96.6 °F (35.9 °C)   12/17/21 98.2 °F (36.8 °C)   12/03/21 97.1 °F (36.2 °C)     BP Readings from Last 3 Encounters:   01/14/22 145/70   12/17/21 160/86   12/03/21 180/100     Pulse Readings from Last 3 Encounters:   01/14/22 116   12/17/21 112   12/03/21 113     Body mass index is 30.48 kg/m².    Physical Exam  Constitutional:       Appearance: Normal appearance. She is well-developed.   HENT:      Head: Normocephalic and atraumatic.   Neck:      Thyroid: No thyromegaly.      Vascular: No carotid bruit.   Cardiovascular:      Rate and Rhythm: Normal rate and regular rhythm.      Heart sounds: Normal heart sounds.   Pulmonary:      Effort: Pulmonary effort is normal.      Breath sounds: Normal breath sounds.    Musculoskeletal:      Cervical back: Neck supple.   Skin:     General: Skin is warm and dry.   Neurological:      Mental Status: She is alert and oriented to person, place, and time.   Psychiatric:         Behavior: Behavior normal.         Assessment/Plan   Diagnoses and all orders for this visit:    1. Essential hypertension (Primary)    Other orders  -     amLODIPine (NORVASC) 5 MG tablet; Take 1 tablet by mouth 2 (Two) Times a Day.  Dispense: 60 tablet; Refill: 1        #1 hypertension- we will continue lisinopril 40 mg a day and will split the dose of amlodipine to 5 mg twice a day to see if patient can tolerate it better.  I also requested that she will get blood pressure monitor and check blood pressure if she is lightheaded.  Follow-up in 1 month.

## 2022-01-25 ENCOUNTER — OFFICE VISIT (OUTPATIENT)
Dept: ENDOCRINOLOGY | Age: 82
End: 2022-01-25

## 2022-01-25 VITALS
HEIGHT: 59 IN | WEIGHT: 148.8 LBS | HEART RATE: 110 BPM | RESPIRATION RATE: 20 BRPM | SYSTOLIC BLOOD PRESSURE: 191 MMHG | BODY MASS INDEX: 30 KG/M2 | OXYGEN SATURATION: 97 % | DIASTOLIC BLOOD PRESSURE: 98 MMHG

## 2022-01-25 DIAGNOSIS — IMO0002 UNCONTROLLED TYPE 2 DIABETES MELLITUS WITH COMPLICATION, WITHOUT LONG-TERM CURRENT USE OF INSULIN: Primary | ICD-10-CM

## 2022-01-25 PROCEDURE — 99214 OFFICE O/P EST MOD 30 MIN: CPT | Performed by: INTERNAL MEDICINE

## 2022-01-25 RX ORDER — FLURBIPROFEN SODIUM 0.3 MG/ML
SOLUTION/ DROPS OPHTHALMIC
Qty: 150 EACH | Refills: 6 | Status: SHIPPED | OUTPATIENT
Start: 2022-01-25 | End: 2023-04-04 | Stop reason: SDUPTHER

## 2022-01-25 RX ORDER — INSULIN LISPRO 100 [IU]/ML
INJECTION, SOLUTION INTRAVENOUS; SUBCUTANEOUS
Qty: 45 ML | Refills: 2 | Status: SHIPPED | OUTPATIENT
Start: 2022-01-25

## 2022-01-25 NOTE — PROGRESS NOTES
"Chief Complaint  Diabetes (TYPE 2)    Subjective          Antonietta Gramajo presents to [facility] ENDOCRINOLOGY  History of Present Illness   81-year-old  female with T2DM over 10 years uncontrolled. Glucose remain in the 200-300 range after meals.   Glucose in am vary from 79 to over 200.   Over holidays indulged in sweets. HGA1C now over 10 %/     Comorbidities: Nonproliferative diabetic retinopathy, glaucoma, macular degeneration, OA, pancytopenia, PAF, hyperlipidemia, HTN, dysfunctional grieving, essential hypertension, vitamin D deficiency.    Multiple allergies to diabetic medication classes limits use of oral agents. Actos is not on allergy list but she indicates she was unable to take.   Also has allergy to sulfa , victoza, invokana, cycloset, atorvastatin.     Current medications  Tresiba 24 units-she takes it almost every 24 hours at about 10 AM to noon.  Metformin 1000 mg daily  Tradjenta 100 mg daily.    Glucose range from 79-3 39 on review of her glucometer.  Last eye examination spring 2021 + for macular degeneration, no retinopathy.  Denies peripheral neuropathy paresthesias, denies coronary disease, denies kidney issues.  Patient checks glucose once a day daily or every other day.  Hypertension:  Rubia Guzman changed her medications amlodipine 5 mg bid. She has run out. 1 month ago and ran out 3 days ago. So bp is elevated today . She will call his office.  Objective   Vital Signs:   BP (!) 191/98   Pulse 110   Resp 20   Ht 148.6 cm (58.5\")   Wt 67.5 kg (148 lb 12.8 oz)   SpO2 97%   BMI 30.57 kg/m²     Physical Exam  Vitals and nursing note reviewed.   HENT:      Head: Normocephalic.      Mouth/Throat:      Mouth: Mucous membranes are moist.   Cardiovascular:      Rate and Rhythm: Normal rate.      Pulses: Normal pulses.      Heart sounds: Normal heart sounds.   Pulmonary:      Effort: Pulmonary effort is normal.      Breath sounds: Normal breath sounds. No wheezing or " rhonchi.   Abdominal:      General: Bowel sounds are normal.      Palpations: Abdomen is soft.   Musculoskeletal:         General: No swelling. Normal range of motion.      Cervical back: Normal range of motion and neck supple.   Skin:     General: Skin is warm and dry.   Neurological:      Mental Status: She is alert and oriented to person, place, and time. Mental status is at baseline.   Psychiatric:         Mood and Affect: Mood normal.         Behavior: Behavior normal.         Judgment: Judgment normal.        Result Review :   The following data was reviewed by: Laurence Tsang MD on 01/25/2022:        Component      Latest Ref Rng & Units 12/17/2021 1/11/2022   Glucose      65 - 99 mg/dL 166 (H) 152 (H)   BUN      8 - 27 mg/dL 17 27   Creatinine      0.57 - 1.00 mg/dL 0.91 0.89   eGFR Non African Am      >59 mL/min/1.73 59 (L) 61   eGFR  Am      >59 mL/min/1.73 68 70   BUN/Creatinine Ratio      12 - 28 19 30 (H)   Sodium      134 - 144 mmol/L 141 141   Potassium      3.5 - 5.2 mmol/L 3.7 4.4   Chloride      96 - 106 mmol/L 100 105   CO2      20 - 29 mmol/L 25 23   Calcium      8.7 - 10.3 mg/dL 10.0 9.2   Total Protein      6.0 - 8.5 g/dL  6.8   Albumin      3.6 - 4.6 g/dL  4.1   Globulin      1.5 - 4.5 g/dL  2.7   A/G Ratio      1.2 - 2.2  1.5   Total Bilirubin      0.0 - 1.2 mg/dL  0.2   Alkaline Phosphatase      44 - 121 IU/L  143 (H)   AST (SGOT)      0 - 40 IU/L  15   ALT (SGPT)      0 - 32 IU/L  14   Total Cholesterol      100 - 199 mg/dL  179   Triglycerides      0 - 149 mg/dL  85   HDL Cholesterol      >39 mg/dL  70   VLDL Cholesterol Rogelio      5 - 40 mg/dL  16   LDL Cholesterol       0 - 99 mg/dL  93   Hemoglobin A1C      4.8 - 5.6 %  10.6 (H)   TSH Baseline      0.450 - 4.500 uIU/mL  0.851   Interpretation        Note        Assessment and Plan        1. Postprandial hyperglycemia.   Add Humalog 6 units with meals plus scale target 140 , correctin 1:30 .    Home instructions  Diabetes  medications    Measure glucose before each meal and bedtime    Take   Treseba 24 units at noon daily.  Metformin 1000 mg daily.  Mealtime    Take Humalog, lispro/NovoLog,/ Novolin R /aspart per scale, 15 minutes before meals   6 units ,Add plus scale as follows    If blood glucose is above 140 then add the following dose of Humalog, lispro/NovoLog, aspart to mealtime insulin [1: 30]    140-170 0 unit  171-200 2 units  201-230 3 units  231-260 4 units  261-290 5 units  291-320 6 units  321-350 7 units  351-380 8 units  381-or greater 9 units    Sick day rules: If glucose is over 250 mg/DL then check glucose every 2 hours and treat with correction scale above.    Example at breakfast take mealtime insulin as above and if glucose 268 then [per scale above] add 6 units. For a total of 6 plus 6 = 12 units.   Take total dose 15 minutes before meal. If at a restaurant, unsure when meal is coming then may take mealtime insulin immediately after the last bite of food.    Diagnoses and all orders for this visit:    1. Uncontrolled type 2 diabetes mellitus with complication, without long-term current use of insulin ([facility]) (Primary)  -     Insulin Lispro, 1 Unit Dial, (HumaLOG KwikPen) 100 UNIT/ML solution pen-injector; Use as directed for meal & correction coverage up to 50 units a day. 6 units plus scale based on glucose of 1 units :30 mg/dl glucose over 140 mg/dl glucose  Dispense: 45 mL; Refill: 2  -     Insulin Pen Needle (B-D UF III MINI PEN NEEDLES) 31G X 5 MM misc; USE ONCE DAILY AS DIRECTED 4 x daily. Dx. E11.65  Dispense: 150 each; Refill: 6  -     TSH; Future  -     Hemoglobin A1c; Future  -     Microalbumin / Creatinine Urine Ratio - Urine, Clean Catch; Future  -     Basic Metabolic Panel; Future      I spent 30 minutes caring for Antonietta on this date of service. This time includes time spent by me in the following activities:reviewing tests, performing a medically appropriate examination and/or evaluation ,  counseling and educating the patient/family/caregiver and ordering medications, tests, or procedures  Follow Up   Return in about 3 months (around 4/25/2022) for Recheck.  Patient was given instructions and counseling regarding her condition or for health maintenance advice. Please see specific information pulled into the AVS if appropriate.

## 2022-01-25 NOTE — PATIENT INSTRUCTIONS
Home instructions  For 1/25/21 visit.  Diabetes medications    Measure glucose before each meal and bedtime    Take   Treseba 24 units at noon daily.  Metformin 1000 mg daily.  Mealtime    Take Humalog, lispro/NovoLog,/ Novolin R /aspart per scale, 15 minutes before meals   6 units ,Add plus scale as follows    If blood glucose is above 140 then add the following dose of Humalog, lispro/NovoLog, aspart to mealtime insulin [1: 30]    140-170 0 unit  171-200 2 units  201-230 3 units  231-260 4 units  261-290 5 units  291-320 6 units  321-350 7 units  351-380 8 units  381-or greater 9 units    Sick day rules: If glucose is over 250 mg/DL then check glucose every 2 hours and treat with correction scale above.    Example at breakfast take mealtime insulin as above and if glucose 268 then [per scale above] add 6 units. For a total of 6 plus 6 = 12 units.   Take total dose 15 minutes before meal. If at a restaurant, unsure when meal is coming then may take mealtime insulin immediately after the last bite of food.

## 2022-02-01 ENCOUNTER — TELEPHONE (OUTPATIENT)
Dept: ENDOCRINOLOGY | Age: 82
End: 2022-02-01

## 2022-02-01 NOTE — TELEPHONE ENCOUNTER
"When putting in the PA this is what cover my meds brought up    \"The member recently filled this medication and will be able to return for their next refill according to their plan limits\"    So it wont let me put any information in from this point  "

## 2022-12-26 ENCOUNTER — APPOINTMENT (OUTPATIENT)
Dept: GENERAL RADIOLOGY | Facility: HOSPITAL | Age: 82
DRG: 378 | End: 2022-12-26
Payer: MEDICARE

## 2022-12-26 ENCOUNTER — HOSPITAL ENCOUNTER (INPATIENT)
Facility: HOSPITAL | Age: 82
LOS: 3 days | Discharge: HOME | DRG: 378 | End: 2022-12-29
Attending: EMERGENCY MEDICINE | Admitting: INTERNAL MEDICINE
Payer: MEDICARE

## 2022-12-26 ENCOUNTER — APPOINTMENT (OUTPATIENT)
Dept: CT IMAGING | Facility: HOSPITAL | Age: 82
DRG: 378 | End: 2022-12-26
Payer: MEDICARE

## 2022-12-26 DIAGNOSIS — K57.90 DIVERTICULOSIS: ICD-10-CM

## 2022-12-26 DIAGNOSIS — R73.9 HYPERGLYCEMIA: ICD-10-CM

## 2022-12-26 DIAGNOSIS — R93.5 ABNORMAL CT OF THE ABDOMEN: ICD-10-CM

## 2022-12-26 DIAGNOSIS — K92.2 ACUTE LOWER GI BLEEDING: Primary | ICD-10-CM

## 2022-12-26 PROBLEM — N18.31 STAGE 3A CHRONIC KIDNEY DISEASE (HCC): Status: ACTIVE | Noted: 2022-12-26

## 2022-12-26 PROBLEM — M79.604 PAIN IN BOTH LOWER EXTREMITIES: Status: ACTIVE | Noted: 2022-12-26

## 2022-12-26 PROBLEM — M25.552 LEFT HIP PAIN: Status: ACTIVE | Noted: 2022-12-26

## 2022-12-26 PROBLEM — M79.605 PAIN IN BOTH LOWER EXTREMITIES: Status: ACTIVE | Noted: 2022-12-26

## 2022-12-26 PROBLEM — D62 ACUTE BLOOD LOSS ANEMIA: Status: ACTIVE | Noted: 2022-12-26

## 2022-12-26 LAB
ABO GROUP BLD: NORMAL
ALBUMIN SERPL-MCNC: 3.3 G/DL (ref 3.5–5.2)
ALBUMIN/GLOB SERPL: 1.5 G/DL
ALP SERPL-CCNC: 107 U/L (ref 39–117)
ALT SERPL W P-5'-P-CCNC: 13 U/L (ref 1–33)
ANION GAP SERPL CALCULATED.3IONS-SCNC: 8.1 MMOL/L (ref 5–15)
ANION GAP SERPL CALCULATED.3IONS-SCNC: 9.4 MMOL/L (ref 5–15)
APTT PPP: 25.8 SECONDS (ref 22.7–35.4)
AST SERPL-CCNC: 13 U/L (ref 1–32)
BASOPHILS # BLD AUTO: 0.02 10*3/MM3 (ref 0–0.2)
BASOPHILS # BLD AUTO: 0.03 10*3/MM3 (ref 0–0.2)
BASOPHILS NFR BLD AUTO: 0.3 % (ref 0–1.5)
BASOPHILS NFR BLD AUTO: 0.4 % (ref 0–1.5)
BILIRUB SERPL-MCNC: 0.2 MG/DL (ref 0–1.2)
BLD GP AB SCN SERPL QL: NEGATIVE
BUN SERPL-MCNC: 24 MG/DL (ref 8–23)
BUN SERPL-MCNC: 27 MG/DL (ref 8–23)
BUN/CREAT SERPL: 26.4 (ref 7–25)
BUN/CREAT SERPL: 26.5 (ref 7–25)
CALCIUM SPEC-SCNC: 8.4 MG/DL (ref 8.6–10.5)
CALCIUM SPEC-SCNC: 8.8 MG/DL (ref 8.6–10.5)
CHLORIDE SERPL-SCNC: 100 MMOL/L (ref 98–107)
CHLORIDE SERPL-SCNC: 105 MMOL/L (ref 98–107)
CO2 SERPL-SCNC: 25.6 MMOL/L (ref 22–29)
CO2 SERPL-SCNC: 25.9 MMOL/L (ref 22–29)
CREAT SERPL-MCNC: 0.91 MG/DL (ref 0.57–1)
CREAT SERPL-MCNC: 1.02 MG/DL (ref 0.57–1)
D DIMER PPP FEU-MCNC: 0.45 MCGFEU/ML (ref 0–0.82)
DEPRECATED RDW RBC AUTO: 38.2 FL (ref 37–54)
DEPRECATED RDW RBC AUTO: 38.9 FL (ref 37–54)
DEPRECATED RDW RBC AUTO: 40.2 FL (ref 37–54)
EGFRCR SERPLBLD CKD-EPI 2021: 55 ML/MIN/1.73
EGFRCR SERPLBLD CKD-EPI 2021: 63.1 ML/MIN/1.73
EOSINOPHIL # BLD AUTO: 0.03 10*3/MM3 (ref 0–0.4)
EOSINOPHIL # BLD AUTO: 0.04 10*3/MM3 (ref 0–0.4)
EOSINOPHIL NFR BLD AUTO: 0.5 % (ref 0.3–6.2)
EOSINOPHIL NFR BLD AUTO: 0.6 % (ref 0.3–6.2)
ERYTHROCYTE [DISTWIDTH] IN BLOOD BY AUTOMATED COUNT: 11.8 % (ref 12.3–15.4)
ERYTHROCYTE [DISTWIDTH] IN BLOOD BY AUTOMATED COUNT: 12.4 % (ref 12.3–15.4)
ERYTHROCYTE [DISTWIDTH] IN BLOOD BY AUTOMATED COUNT: 12.5 % (ref 12.3–15.4)
GLOBULIN UR ELPH-MCNC: 2.2 GM/DL
GLUCOSE BLDC GLUCOMTR-MCNC: 101 MG/DL (ref 70–130)
GLUCOSE BLDC GLUCOMTR-MCNC: 114 MG/DL (ref 70–130)
GLUCOSE BLDC GLUCOMTR-MCNC: 195 MG/DL (ref 70–130)
GLUCOSE BLDC GLUCOMTR-MCNC: 96 MG/DL (ref 70–130)
GLUCOSE SERPL-MCNC: 201 MG/DL (ref 65–99)
GLUCOSE SERPL-MCNC: 519 MG/DL (ref 65–99)
HCT VFR BLD AUTO: 23.5 % (ref 34–46.6)
HCT VFR BLD AUTO: 24.9 % (ref 34–46.6)
HCT VFR BLD AUTO: 24.9 % (ref 34–46.6)
HCT VFR BLD AUTO: 25.2 % (ref 34–46.6)
HCT VFR BLD AUTO: 28 % (ref 34–46.6)
HCT VFR BLD AUTO: 28.5 % (ref 34–46.6)
HGB BLD-MCNC: 7.7 G/DL (ref 12–15.9)
HGB BLD-MCNC: 8.1 G/DL (ref 12–15.9)
HGB BLD-MCNC: 8.1 G/DL (ref 12–15.9)
HGB BLD-MCNC: 8.8 G/DL (ref 12–15.9)
HGB BLD-MCNC: 9.4 G/DL (ref 12–15.9)
HGB BLD-MCNC: 9.5 G/DL (ref 12–15.9)
IMM GRANULOCYTES # BLD AUTO: 0.01 10*3/MM3 (ref 0–0.05)
IMM GRANULOCYTES # BLD AUTO: 0.01 10*3/MM3 (ref 0–0.05)
IMM GRANULOCYTES NFR BLD AUTO: 0.1 % (ref 0–0.5)
IMM GRANULOCYTES NFR BLD AUTO: 0.2 % (ref 0–0.5)
INR PPP: 1.01 (ref 0.9–1.1)
LYMPHOCYTES # BLD AUTO: 1.38 10*3/MM3 (ref 0.7–3.1)
LYMPHOCYTES # BLD AUTO: 1.44 10*3/MM3 (ref 0.7–3.1)
LYMPHOCYTES NFR BLD AUTO: 19.2 % (ref 19.6–45.3)
LYMPHOCYTES NFR BLD AUTO: 21.7 % (ref 19.6–45.3)
MCH RBC QN AUTO: 28.9 PG (ref 26.6–33)
MCH RBC QN AUTO: 29.4 PG (ref 26.6–33)
MCH RBC QN AUTO: 29.5 PG (ref 26.6–33)
MCHC RBC AUTO-ENTMCNC: 32.5 G/DL (ref 31.5–35.7)
MCHC RBC AUTO-ENTMCNC: 33.3 G/DL (ref 31.5–35.7)
MCHC RBC AUTO-ENTMCNC: 34.9 G/DL (ref 31.5–35.7)
MCV RBC AUTO: 84.6 FL (ref 79–97)
MCV RBC AUTO: 88.2 FL (ref 79–97)
MCV RBC AUTO: 88.9 FL (ref 79–97)
MONOCYTES # BLD AUTO: 0.22 10*3/MM3 (ref 0.1–0.9)
MONOCYTES # BLD AUTO: 0.57 10*3/MM3 (ref 0.1–0.9)
MONOCYTES NFR BLD AUTO: 3.3 % (ref 5–12)
MONOCYTES NFR BLD AUTO: 7.9 % (ref 5–12)
NEUTROPHILS NFR BLD AUTO: 4.93 10*3/MM3 (ref 1.7–7)
NEUTROPHILS NFR BLD AUTO: 5.15 10*3/MM3 (ref 1.7–7)
NEUTROPHILS NFR BLD AUTO: 71.8 % (ref 42.7–76)
NEUTROPHILS NFR BLD AUTO: 74 % (ref 42.7–76)
NRBC BLD AUTO-RTO: 0 /100 WBC (ref 0–0.2)
NRBC BLD AUTO-RTO: 0 /100 WBC (ref 0–0.2)
PLATELET # BLD AUTO: 182 10*3/MM3 (ref 140–450)
PLATELET # BLD AUTO: 191 10*3/MM3 (ref 140–450)
PLATELET # BLD AUTO: 220 10*3/MM3 (ref 140–450)
PMV BLD AUTO: 10.4 FL (ref 6–12)
PMV BLD AUTO: 9.8 FL (ref 6–12)
PMV BLD AUTO: 9.8 FL (ref 6–12)
POTASSIUM SERPL-SCNC: 3.4 MMOL/L (ref 3.5–5.2)
POTASSIUM SERPL-SCNC: 4.2 MMOL/L (ref 3.5–5.2)
PROT SERPL-MCNC: 5.5 G/DL (ref 6–8.5)
PROTHROMBIN TIME: 13.4 SECONDS (ref 11.7–14.2)
RBC # BLD AUTO: 2.8 10*6/MM3 (ref 3.77–5.28)
RBC # BLD AUTO: 2.98 10*6/MM3 (ref 3.77–5.28)
RBC # BLD AUTO: 3.23 10*6/MM3 (ref 3.77–5.28)
RH BLD: POSITIVE
SODIUM SERPL-SCNC: 135 MMOL/L (ref 136–145)
SODIUM SERPL-SCNC: 139 MMOL/L (ref 136–145)
T&S EXPIRATION DATE: NORMAL
WBC NRBC COR # BLD: 6.65 10*3/MM3 (ref 3.4–10.8)
WBC NRBC COR # BLD: 7.18 10*3/MM3 (ref 3.4–10.8)
WBC NRBC COR # BLD: 7.53 10*3/MM3 (ref 3.4–10.8)

## 2022-12-26 PROCEDURE — 85027 COMPLETE CBC AUTOMATED: CPT | Performed by: INTERNAL MEDICINE

## 2022-12-26 PROCEDURE — 72114 X-RAY EXAM L-S SPINE BENDING: CPT

## 2022-12-26 PROCEDURE — 74177 CT ABD & PELVIS W/CONTRAST: CPT

## 2022-12-26 PROCEDURE — 63710000001 INSULIN LISPRO (HUMAN) PER 5 UNITS: Performed by: NURSE PRACTITIONER

## 2022-12-26 PROCEDURE — 99285 EMERGENCY DEPT VISIT HI MDM: CPT

## 2022-12-26 PROCEDURE — 86900 BLOOD TYPING SEROLOGIC ABO: CPT | Performed by: EMERGENCY MEDICINE

## 2022-12-26 PROCEDURE — 86850 RBC ANTIBODY SCREEN: CPT | Performed by: EMERGENCY MEDICINE

## 2022-12-26 PROCEDURE — 25010000002 IOPAMIDOL 61 % SOLUTION: Performed by: EMERGENCY MEDICINE

## 2022-12-26 PROCEDURE — 85379 FIBRIN DEGRADATION QUANT: CPT | Performed by: NURSE PRACTITIONER

## 2022-12-26 PROCEDURE — 36415 COLL VENOUS BLD VENIPUNCTURE: CPT | Performed by: NURSE PRACTITIONER

## 2022-12-26 PROCEDURE — 86901 BLOOD TYPING SEROLOGIC RH(D): CPT

## 2022-12-26 PROCEDURE — 80053 COMPREHEN METABOLIC PANEL: CPT | Performed by: EMERGENCY MEDICINE

## 2022-12-26 PROCEDURE — 73502 X-RAY EXAM HIP UNI 2-3 VIEWS: CPT

## 2022-12-26 PROCEDURE — 86923 COMPATIBILITY TEST ELECTRIC: CPT

## 2022-12-26 PROCEDURE — 86900 BLOOD TYPING SEROLOGIC ABO: CPT

## 2022-12-26 PROCEDURE — P9016 RBC LEUKOCYTES REDUCED: HCPCS

## 2022-12-26 PROCEDURE — 85014 HEMATOCRIT: CPT | Performed by: NURSE PRACTITIONER

## 2022-12-26 PROCEDURE — 63710000001 INSULIN REGULAR HUMAN PER 5 UNITS: Performed by: EMERGENCY MEDICINE

## 2022-12-26 PROCEDURE — 85025 COMPLETE CBC W/AUTO DIFF WBC: CPT | Performed by: EMERGENCY MEDICINE

## 2022-12-26 PROCEDURE — 82962 GLUCOSE BLOOD TEST: CPT

## 2022-12-26 PROCEDURE — 85025 COMPLETE CBC W/AUTO DIFF WBC: CPT | Performed by: NURSE PRACTITIONER

## 2022-12-26 PROCEDURE — 85610 PROTHROMBIN TIME: CPT | Performed by: EMERGENCY MEDICINE

## 2022-12-26 PROCEDURE — 86901 BLOOD TYPING SEROLOGIC RH(D): CPT | Performed by: EMERGENCY MEDICINE

## 2022-12-26 PROCEDURE — 85730 THROMBOPLASTIN TIME PARTIAL: CPT | Performed by: EMERGENCY MEDICINE

## 2022-12-26 PROCEDURE — 85018 HEMOGLOBIN: CPT | Performed by: NURSE PRACTITIONER

## 2022-12-26 PROCEDURE — 99222 1ST HOSP IP/OBS MODERATE 55: CPT | Performed by: INTERNAL MEDICINE

## 2022-12-26 PROCEDURE — 36430 TRANSFUSION BLD/BLD COMPNT: CPT

## 2022-12-26 RX ORDER — LIDOCAINE 50 MG/G
1 PATCH TOPICAL
Status: DISCONTINUED | OUTPATIENT
Start: 2022-12-26 | End: 2022-12-29 | Stop reason: HOSPADM

## 2022-12-26 RX ORDER — POLYETHYLENE GLYCOL 3350 17 G/17G
0.5 POWDER, FOR SOLUTION ORAL EVERY 12 HOURS
Status: COMPLETED | OUTPATIENT
Start: 2022-12-26 | End: 2022-12-27

## 2022-12-26 RX ORDER — ALBUTEROL SULFATE 2.5 MG/3ML
2.5 SOLUTION RESPIRATORY (INHALATION) EVERY 6 HOURS PRN
Status: DISCONTINUED | OUTPATIENT
Start: 2022-12-26 | End: 2022-12-29 | Stop reason: HOSPADM

## 2022-12-26 RX ORDER — MULTIVIT WITH MINERALS/LUTEIN
500 TABLET ORAL DAILY
COMMUNITY
End: 2023-02-15

## 2022-12-26 RX ORDER — SODIUM CHLORIDE 9 MG/ML
50 INJECTION, SOLUTION INTRAVENOUS CONTINUOUS
Status: DISCONTINUED | OUTPATIENT
Start: 2022-12-26 | End: 2022-12-28

## 2022-12-26 RX ORDER — INSULIN LISPRO 100 [IU]/ML
0-7 INJECTION, SOLUTION INTRAVENOUS; SUBCUTANEOUS
Status: DISCONTINUED | OUTPATIENT
Start: 2022-12-26 | End: 2022-12-29 | Stop reason: HOSPADM

## 2022-12-26 RX ORDER — PANTOPRAZOLE SODIUM 40 MG/10ML
40 INJECTION, POWDER, LYOPHILIZED, FOR SOLUTION INTRAVENOUS
Status: DISCONTINUED | OUTPATIENT
Start: 2022-12-26 | End: 2022-12-29 | Stop reason: HOSPADM

## 2022-12-26 RX ORDER — SODIUM CHLORIDE 0.9 % (FLUSH) 0.9 %
10 SYRINGE (ML) INJECTION EVERY 12 HOURS SCHEDULED
Status: DISCONTINUED | OUTPATIENT
Start: 2022-12-26 | End: 2022-12-29 | Stop reason: HOSPADM

## 2022-12-26 RX ORDER — NITROGLYCERIN 0.4 MG/1
0.4 TABLET SUBLINGUAL
Status: DISCONTINUED | OUTPATIENT
Start: 2022-12-26 | End: 2022-12-29 | Stop reason: HOSPADM

## 2022-12-26 RX ORDER — SODIUM CHLORIDE 0.9 % (FLUSH) 0.9 %
10 SYRINGE (ML) INJECTION AS NEEDED
Status: DISCONTINUED | OUTPATIENT
Start: 2022-12-26 | End: 2022-12-29 | Stop reason: HOSPADM

## 2022-12-26 RX ORDER — NICOTINE POLACRILEX 4 MG
15 LOZENGE BUCCAL
Status: DISCONTINUED | OUTPATIENT
Start: 2022-12-26 | End: 2022-12-29 | Stop reason: HOSPADM

## 2022-12-26 RX ORDER — ACETAMINOPHEN 160 MG/5ML
650 SOLUTION ORAL EVERY 4 HOURS PRN
Status: DISCONTINUED | OUTPATIENT
Start: 2022-12-26 | End: 2022-12-29 | Stop reason: HOSPADM

## 2022-12-26 RX ORDER — ACETAMINOPHEN 325 MG/1
650 TABLET ORAL EVERY 4 HOURS PRN
Status: DISCONTINUED | OUTPATIENT
Start: 2022-12-26 | End: 2022-12-29 | Stop reason: HOSPADM

## 2022-12-26 RX ORDER — GABAPENTIN 100 MG/1
100 CAPSULE ORAL EVERY 12 HOURS SCHEDULED
Status: DISCONTINUED | OUTPATIENT
Start: 2022-12-26 | End: 2022-12-29

## 2022-12-26 RX ORDER — ACETAMINOPHEN 650 MG/1
650 SUPPOSITORY RECTAL EVERY 4 HOURS PRN
Status: DISCONTINUED | OUTPATIENT
Start: 2022-12-26 | End: 2022-12-29 | Stop reason: HOSPADM

## 2022-12-26 RX ORDER — POTASSIUM CHLORIDE 7.45 MG/ML
10 INJECTION INTRAVENOUS
Status: DISCONTINUED | OUTPATIENT
Start: 2022-12-26 | End: 2022-12-29 | Stop reason: HOSPADM

## 2022-12-26 RX ORDER — ONDANSETRON 2 MG/ML
4 INJECTION INTRAMUSCULAR; INTRAVENOUS EVERY 6 HOURS PRN
Status: DISCONTINUED | OUTPATIENT
Start: 2022-12-26 | End: 2022-12-29 | Stop reason: HOSPADM

## 2022-12-26 RX ORDER — ONDANSETRON 2 MG/ML
4 INJECTION INTRAMUSCULAR; INTRAVENOUS ONCE
Status: DISCONTINUED | OUTPATIENT
Start: 2022-12-26 | End: 2022-12-29 | Stop reason: HOSPADM

## 2022-12-26 RX ORDER — POTASSIUM CHLORIDE 1.5 G/1.77G
40 POWDER, FOR SOLUTION ORAL AS NEEDED
Status: DISCONTINUED | OUTPATIENT
Start: 2022-12-26 | End: 2022-12-29 | Stop reason: HOSPADM

## 2022-12-26 RX ORDER — DEXTROSE MONOHYDRATE 25 G/50ML
25 INJECTION, SOLUTION INTRAVENOUS
Status: DISCONTINUED | OUTPATIENT
Start: 2022-12-26 | End: 2022-12-29 | Stop reason: HOSPADM

## 2022-12-26 RX ORDER — MORPHINE SULFATE 2 MG/ML
2 INJECTION, SOLUTION INTRAMUSCULAR; INTRAVENOUS ONCE
Status: DISCONTINUED | OUTPATIENT
Start: 2022-12-26 | End: 2022-12-29 | Stop reason: HOSPADM

## 2022-12-26 RX ORDER — AMLODIPINE BESYLATE 5 MG/1
5 TABLET ORAL
Status: DISCONTINUED | OUTPATIENT
Start: 2022-12-26 | End: 2022-12-29 | Stop reason: HOSPADM

## 2022-12-26 RX ORDER — POTASSIUM CHLORIDE 750 MG/1
40 TABLET, FILM COATED, EXTENDED RELEASE ORAL AS NEEDED
Status: DISCONTINUED | OUTPATIENT
Start: 2022-12-26 | End: 2022-12-29 | Stop reason: HOSPADM

## 2022-12-26 RX ORDER — SODIUM CHLORIDE 9 MG/ML
40 INJECTION, SOLUTION INTRAVENOUS AS NEEDED
Status: DISCONTINUED | OUTPATIENT
Start: 2022-12-26 | End: 2022-12-29 | Stop reason: HOSPADM

## 2022-12-26 RX ADMIN — INSULIN GLARGINE-YFGN 20 UNITS: 100 INJECTION, SOLUTION SUBCUTANEOUS at 11:47

## 2022-12-26 RX ADMIN — INSULIN LISPRO 3 UNITS: 100 INJECTION, SOLUTION INTRAVENOUS; SUBCUTANEOUS at 08:43

## 2022-12-26 RX ADMIN — GABAPENTIN 100 MG: 100 CAPSULE ORAL at 14:58

## 2022-12-26 RX ADMIN — AMLODIPINE BESYLATE 5 MG: 5 TABLET ORAL at 17:47

## 2022-12-26 RX ADMIN — GABAPENTIN 100 MG: 100 CAPSULE ORAL at 20:35

## 2022-12-26 RX ADMIN — POLYETHYLENE GLYCOL 3350 0.5 BOTTLE: 17 POWDER, FOR SOLUTION ORAL at 16:48

## 2022-12-26 RX ADMIN — PANTOPRAZOLE SODIUM 40 MG: 40 INJECTION, POWDER, FOR SOLUTION INTRAVENOUS at 05:52

## 2022-12-26 RX ADMIN — Medication 10 ML: at 20:37

## 2022-12-26 RX ADMIN — SODIUM CHLORIDE 100 ML/HR: 9 INJECTION, SOLUTION INTRAVENOUS at 08:01

## 2022-12-26 RX ADMIN — Medication 10 ML: at 08:04

## 2022-12-26 RX ADMIN — POTASSIUM CHLORIDE 40 MEQ: 750 TABLET, EXTENDED RELEASE ORAL at 14:57

## 2022-12-26 RX ADMIN — SODIUM CHLORIDE 1000 ML: 9 INJECTION, SOLUTION INTRAVENOUS at 03:03

## 2022-12-26 RX ADMIN — INSULIN HUMAN 10 UNITS: 100 INJECTION, SOLUTION PARENTERAL at 05:28

## 2022-12-26 RX ADMIN — Medication 10 ML: at 08:43

## 2022-12-26 RX ADMIN — LIDOCAINE 1 PATCH: 50 PATCH TOPICAL at 14:58

## 2022-12-26 RX ADMIN — IOPAMIDOL 85 ML: 612 INJECTION, SOLUTION INTRAVENOUS at 04:08

## 2022-12-26 NOTE — ED PROVIDER NOTES
EMERGENCY DEPARTMENT ENCOUNTER    Room Number:  16/16  Date seen:  12/26/2022  PCP: Gemini Catherine MD  Historian: Patient      HPI:  Chief Complaint: Lower GI bleeding  A complete HPI/ROS/PMH/PSH/SH/FH are unobtainable due to: None  Context: Antonietta Gramajo is a 82 y.o. female who presents to the ED c/o sudden onset lower GI bleeding that began earlier this morning and has been persistent since onset throughout the day today.  She reports that the blood is bright red and has been present with each bowel movement since this morning.  She also complains of some generalized abdominal discomfort that she describes as a \"bloating\" sensation as well as a dull ache.  She does take an aspirin daily but denies any other anticoagulants.  She denies nausea/vomiting, diarrhea/constipation, fever/chills, chest pain, or shortness of breath.  She does report a history of IBS as well as diverticulitis but denies any previous history of GI bleeding.          PAST MEDICAL HISTORY  Active Ambulatory Problems     Diagnosis Date Noted   • Allergic rhinitis 09/16/2016   • GERD without esophagitis 09/16/2016   • Essential hypertension 09/16/2016   • Osteoarthritis of knee 09/16/2016   • Type 2 diabetes mellitus, with long-term current use of insulin (Formerly [facility]) 09/16/2016   • Open-angle glaucoma 02/14/2017   • Nonproliferative diabetic retinopathy of both eyes (Formerly [facility]) 04/10/2017   • Hyperlipidemia 02/06/2018   • Long-term insulin use (Formerly [facility]) 02/06/2018   • Mild intermittent asthma without complication 03/06/2018   • Osteoarthritis of multiple joints 09/11/2018   • Vitamin D deficiency 09/18/2018   • Diabetic autonomic neuropathy associated with type 2 diabetes mellitus (Formerly [facility]) 09/18/2018   • Neutropenia ([facility]ly [facility]) 07/19/2020   • Paroxysmal atrial fibrillation ([facility]ly [facility]) 07/22/2020   • Pancytopenia ([facility]ly [facility]) 07/22/2020   • Dysfunctional grieving 12/06/2021     Resolved Ambulatory Problems     Diagnosis Date Noted   • Avitaminosis D 09/16/2016   •  Hypervitaminosis D  10/31/2017   • Acute UTI 07/19/2020   • Hyponatremia 07/19/2020   • ODALYS (acute kidney injury) (Formerly [facility]) 07/19/2020   • Sepsis, unspecified organism (Former[facility]) 07/19/2020     Past Medical History:   Diagnosis Date   • Cataract    • Chest pain    • Diverticulitis    • Fibromyalgia    • Skin cancer    • Type 2 diabetes mellitus (HCC)          PAST SURGICAL HISTORY  Past Surgical History:   Procedure Laterality Date   • ANKLE SURGERY     • CATARACT EXTRACTION     • CHOLECYSTECTOMY     • CLEFT PALATE REPAIR     • FOOT SURGERY     • HYSTERECTOMY     • TONSILLECTOMY           FAMILY HISTORY  Family History   Problem Relation Age of Onset   • Stroke Mother    • Diabetes Mother    • Heart attack Father    • Breast cancer Sister    • Hypertension Sister    • Lung cancer Brother    • Hypertension Brother    • Heart attack Brother          SOCIAL HISTORY  Social History     Socioeconomic History   • Marital status:    Tobacco Use   • Smoking status: Never   • Smokeless tobacco: Never   Vaping Use   • Vaping Use: Never used   Substance and Sexual Activity   • Alcohol use: No   • Drug use: No   • Sexual activity: Defer         ALLERGIES  Sulfa antibiotics, Atorvastatin, Cycloset [bromocriptine], Invokana [canagliflozin], Oxycodone-acetaminophen, Propoxyphene-acetaminophen, and Victoza [liraglutide]        REVIEW OF SYSTEMS  Review of Systems   Constitutional: Negative for fever.   HENT: Negative for sore throat.    Eyes: Negative.    Respiratory: Negative for cough and shortness of breath.    Cardiovascular: Negative for chest pain.   Gastrointestinal: Positive for abdominal pain and blood in stool. Negative for diarrhea and vomiting.   Genitourinary: Negative for dysuria.   Musculoskeletal: Negative for neck pain.   Skin: Negative for rash.   Allergic/Immunologic: Negative.    Neurological: Negative for weakness, numbness and headaches.   Hematological: Negative.    Psychiatric/Behavioral: Negative.    All  other systems reviewed and are negative.         PHYSICAL EXAM  ED Triage Vitals [12/26/22 0135]   Temp Heart Rate Resp BP SpO2   97.3 °F (36.3 °C) 110 18 150/88 98 %      Temp src Heart Rate Source Patient Position BP Location FiO2 (%)   Tympanic Monitor Lying -- --       Physical Exam  Vitals and nursing note reviewed.   Constitutional:       General: She is not in acute distress.  HENT:      Head: Normocephalic and atraumatic.   Eyes:      Extraocular Movements: EOM normal.      Pupils: Pupils are equal, round, and reactive to light.   Cardiovascular:      Rate and Rhythm: Normal rate and regular rhythm.      Heart sounds: Normal heart sounds.   Pulmonary:      Effort: Pulmonary effort is normal. No respiratory distress.      Breath sounds: Normal breath sounds.   Abdominal:      Palpations: Abdomen is soft.      Tenderness: There is abdominal tenderness in the left lower quadrant. There is no guarding or rebound.   Musculoskeletal:         General: No edema. Normal range of motion.      Cervical back: Normal range of motion and neck supple.   Skin:     General: Skin is warm and dry.      Findings: No rash.   Neurological:      Mental Status: She is alert and oriented to person, place, and time.      Sensory: Sensation is intact.      Motor: Motor strength is normal.   Psychiatric:         Mood and Affect: Mood and affect normal.         Vital signs and nursing notes reviewed.          LAB RESULTS  Recent Results (from the past 24 hour(s))   Comprehensive Metabolic Panel    Collection Time: 12/26/22  2:05 AM    Specimen: Blood   Result Value Ref Range    Glucose 519 (C) 65 - 99 mg/dL    BUN 27 (H) 8 - 23 mg/dL    Creatinine 1.02 (H) 0.57 - 1.00 mg/dL    Sodium 135 (L) 136 - 145 mmol/L    Potassium 4.2 3.5 - 5.2 mmol/L    Chloride 100 98 - 107 mmol/L    CO2 25.6 22.0 - 29.0 mmol/L    Calcium 8.8 8.6 - 10.5 mg/dL    Total Protein 5.5 (L) 6.0 - 8.5 g/dL    Albumin 3.30 (L) 3.50 - 5.20 g/dL    ALT (SGPT) 13 1 - 33  U/L    AST (SGOT) 13 1 - 32 U/L    Alkaline Phosphatase 107 39 - 117 U/L    Total Bilirubin 0.2 0.0 - 1.2 mg/dL    Globulin 2.2 gm/dL    A/G Ratio 1.5 g/dL    BUN/Creatinine Ratio 26.5 (H) 7.0 - 25.0    Anion Gap 9.4 5.0 - 15.0 mmol/L    eGFR 55.0 (L) >60.0 mL/min/1.73   Protime-INR    Collection Time: 12/26/22  2:05 AM    Specimen: Blood   Result Value Ref Range    Protime 13.4 11.7 - 14.2 Seconds    INR 1.01 0.90 - 1.10   aPTT    Collection Time: 12/26/22  2:05 AM    Specimen: Blood   Result Value Ref Range    PTT 25.8 22.7 - 35.4 seconds   CBC Auto Differential    Collection Time: 12/26/22  2:05 AM    Specimen: Blood   Result Value Ref Range    WBC 7.18 3.40 - 10.80 10*3/mm3    RBC 3.23 (L) 3.77 - 5.28 10*6/mm3    Hemoglobin 9.5 (L) 12.0 - 15.9 g/dL    Hematocrit 28.5 (L) 34.0 - 46.6 %    MCV 88.2 79.0 - 97.0 fL    MCH 29.4 26.6 - 33.0 pg    MCHC 33.3 31.5 - 35.7 g/dL    RDW 11.8 (L) 12.3 - 15.4 %    RDW-SD 38.9 37.0 - 54.0 fl    MPV 10.4 6.0 - 12.0 fL    Platelets 220 140 - 450 10*3/mm3    Neutrophil % 71.8 42.7 - 76.0 %    Lymphocyte % 19.2 (L) 19.6 - 45.3 %    Monocyte % 7.9 5.0 - 12.0 %    Eosinophil % 0.6 0.3 - 6.2 %    Basophil % 0.4 0.0 - 1.5 %    Immature Grans % 0.1 0.0 - 0.5 %    Neutrophils, Absolute 5.15 1.70 - 7.00 10*3/mm3    Lymphocytes, Absolute 1.38 0.70 - 3.10 10*3/mm3    Monocytes, Absolute 0.57 0.10 - 0.90 10*3/mm3    Eosinophils, Absolute 0.04 0.00 - 0.40 10*3/mm3    Basophils, Absolute 0.03 0.00 - 0.20 10*3/mm3    Immature Grans, Absolute 0.01 0.00 - 0.05 10*3/mm3    nRBC 0.0 0.0 - 0.2 /100 WBC   Type & Screen    Collection Time: 12/26/22  2:24 AM    Specimen: Blood   Result Value Ref Range    ABO Type O     RH type Positive     Antibody Screen Negative     T&S Expiration Date 12/29/2022 11:59:59 PM        Ordered the above labs and reviewed the results.        RADIOLOGY  CT Abdomen Pelvis With Contrast    Result Date: 12/26/2022  CT OF THE ABDOMEN AND PELVIS WITH CONTRAST  HISTORY: Left  lower quadrant pain  COMPARISON: 03/18/2019  TECHNIQUE: Axial CT imaging was obtained through the abdomen and pelvis. IV contrast was administered.  FINDINGS: Images through the lung bases do not demonstrate any acute abnormalities. Images are degraded by motion artifact. There is a small enhancing focus identified within the right hepatic lobe. It measures up to 1 cm in size. Exact etiology is uncertain. It may reflect a small portal-hepatic venous fistula, but is favored to be benign. There is a small hiatal hernia. Duodenum appears unremarkable. There is a right adrenal nodule. This appears to be stable in size to 2019. Left adrenal gland and spleen are normal. Gallbladder is surgically absent. Pancreas is atrophic. The kidneys enhance symmetrically. No hydronephrosis is seen. There is an area of cortical scarring with associated dystrophic calcification in the right kidney. There is a simple appearing left renal cyst. Urinary bladder appears somewhat thick-walled. Some of this may be related to incomplete distention, but correlation with urinalysis and urine cultures is recommended. Uterus is surgically absent. There is no bowel obstruction. There is colonic diverticulosis. I don't see evidence of diverticulitis. There is potentially a thick-walled appearance to the patient's rectum. Some of the appearance may be related to incomplete distention, although some proctitis is not excluded. There is no evidence of appendicitis. No acute osseous abnormalities are seen. There is lumbar scoliosis, with convexity to the right.       1. Colonic diverticulosis. 2. There is a somewhat thick-walled appearance to the patient's rectum. This may be related to incomplete distention. Proctitis is not completely excluded. 3. Thick-walled appearance to the urinary bladder. Correlation with urinalysis and cultures is recommended. 4. Small enhancing focus within the right hepatic lobe is indeterminate, although favored to be benign.  It is in proximity to both portal and hepatic venous branches, and may reflect a small fistula.  Radiation dose reduction techniques were utilized, including automated exposure control and exposure modulation based on body size.  This report was finalized on 12/26/2022 4:52 AM by Dr. Katerin Gerber M.D.        Ordered the above noted radiological studies. Reviewed by me in PACS.            PROCEDURES  Procedures          MEDICATIONS GIVEN IN ER  Medications   sodium chloride 0.9 % flush 10 mL (has no administration in time range)   ondansetron (ZOFRAN) injection 4 mg (has no administration in time range)   morphine injection 2 mg (has no administration in time range)   sodium chloride 0.9 % bolus 1,000 mL (1,000 mL Intravenous New Bag 12/26/22 0303)   iopamidol (ISOVUE-300) 61 % injection 100 mL (85 mL Intravenous Given 12/26/22 0408)   insulin regular (humuLIN R,novoLIN R) injection 10 Units (10 Units Intravenous Given 12/26/22 0528)             MEDICAL DECISION MAKING, PROGRESS, and CONSULTS    All labs have been independently reviewed by me.  All radiology studies have been reviewed by me and I have also reviewed the radiology report.   EKG's independently viewed and interpreted by me.  Discussion below represents my analysis of pertinent findings related to patient's condition, differential diagnosis, treatment plan and final disposition.      Additional sources:  - Discussed/ obtained information from independent historians: History obtained from the patient as well as the patient's family present at bedside    - External (non-ED) record review: On medical records review, the patient was last admitted to the hospital and treated on 7/18/2020 secondary to a urinary tract infection with associated fever and pancytopenia.    - Chronic or social conditions impacting care: Reported history of IBS/diverticulitis    - Shared decision making: Decision for admission made with myself and in conjunction with the  patient as well as the patient's family member at bedside.  We did discuss the case as well with WALTER Brown, who agreed to admit the patient to the hospital for Dr. Ochoa.      Orders placed during this visit:  Orders Placed This Encounter   Procedures   • CT Abdomen Pelvis With Contrast   • Comprehensive Metabolic Panel   • Protime-INR   • aPTT   • CBC Auto Differential   • Cardiac Monitoring   • [facility]HA (on-call MD unless specified) Details   • Type & Screen   • Insert Peripheral IV   • Inpatient Admission   • CBC & Differential           Differential diagnosis:    Differential diagnosis includes but is not limited to lower GI bleeding, acute diverticulitis, diverticulosis, hemorrhoidal bleeding, acute colitis, or colonic mass.      Independent interpretation of labs, radiology studies, and discussions with consultants:  ED Course as of 12/26/22 0533   Mon Dec 26, 2022   0516 On reevaluation, the patient is resting comfortably but does report continued and ongoing bright red blood with bowel movements.  I informed her of her work-up including diverticulosis without obvious diverticulitis seen on CT scan of the abdomen and pelvis.  Her hemoglobin, though stable, is down slightly from her last hemoglobin report.  I informed her as well as the family at bedside that we would admit her today for further treatment and GI evaluation.  They are in agreement with that plan and all questions have been answered. [BM]   0531 The patient's presentation as well as lab and CT findings were discussed with WALTER Brown for A, who agrees to admit the patient to the hospital for Dr. Ochoa. [BM]      ED Course User Index  [BM] Wm Streeter MD         The patient was wearing a facemask upon entrance into the room and remained in such throughout their visit.  I was wearing PPE including a facemask, eye protection, as well as gloves at any point entering the room and throughout the  visit      DIAGNOSIS  Final diagnoses:   Acute lower GI bleeding   Diverticulosis   Hyperglycemia         DISPOSITION  ADMISSION    Discussed treatment plan and reason for admission with pt/family and admitting physician.  Pt/family voiced understanding of the plan for admission for further testing/treatment as needed.                 Latest Documented Vital Signs:  As of 05:33 EST  BP- 150/70 HR- 91 Temp- 97.3 °F (36.3 °C) (Tympanic) O2 sat- 99%              --    Please note that portions of this were completed with a voice recognition program.       Note Disclaimer: At [facility], we believe that sharing information builds trust and better relationships. You are receiving this note because you are receiving care at [facility] or recently visited. It is possible you will see health information before a provider has talked with you about it. This kind of information can be easy to misunderstand. To help you fully understand what it means for your health, we urge you to discuss this note with your provider.           Wm Streeter MD  12/26/22 2016

## 2022-12-26 NOTE — PLAN OF CARE
Problem: Adult Inpatient Plan of Care  Goal: Plan of Care Review  Outcome: Ongoing, Progressing  Flowsheets (Taken 12/26/2022 1534)  Progress: no change  Plan of Care Reviewed With: patient   Goal Outcome Evaluation:  Plan of Care Reviewed With: patient        Progress: no change       Pt is having bright red bowel movements. Hgb dropped to 7.7 transfused 1 unit PRBC. Standing order to transfuse 2 units if hgb drops below 7.  Pt will be NPO at midnight for a colonoscopy tomorrow. Bowel prep to be started tonight around 1700. Consent is signed and in the chart. Pt is still getting IV fluids. VSS

## 2022-12-26 NOTE — H&P
Patient Name:  Antonietta Gramajo  YOB: 1940  MRN:  [id number]  Admit Date:  12/26/2022  Patient Care Team:  Gemini Catherine MD as PCP - General      Subjective   History Present Illness     Chief Complaint   Patient presents with   • Rectal Bleeding       Ms. Gramajo is a 82 y.o. never smoker with a history of HLD, DM II, asthma, HTN who presents to [facility] ER with chief complaint of rectal bleeding & admitted for acute lower GI bleeding.    Complains of dark stools / lower gastrointestinal bleed starting on 12/25/2022; therefore, went to [facility] ER for further evaluation.      Serum hemoglobin 7.7 trending down from 10.8 (7/2020) and hyperglycemia with blood glucose greater than 500.  Colonic diverticulosis & questionable proctitis on CT abdomen pelvis with contrast.    Recommendation pending hospital course.  Details below.     History of Present Illness  Review of Systems   Constitutional: Negative for chills and fever.   HENT: Negative for congestion and rhinorrhea.    Respiratory: Negative for cough and shortness of breath.    Cardiovascular: Negative for chest pain and leg swelling.   Gastrointestinal: Positive for abdominal pain and blood in stool. Negative for nausea and vomiting.   Endocrine: Negative for polydipsia, polyphagia and polyuria.   Genitourinary: Negative for difficulty urinating and dysuria.   Musculoskeletal: Positive for gait problem (due to left hip / leg pain) and myalgias (BLE).   Skin: Negative for rash and wound.   Neurological: Negative for speech difficulty and light-headedness.   Psychiatric/Behavioral: Negative for confusion and hallucinations.        Personal History     Past Medical History:   Diagnosis Date   • Cataract    • Chest pain    • Diverticulitis    • Essential hypertension    • Fibromyalgia    • Hyperlipidemia    • Mild intermittent asthma without complication    • Osteoarthritis of multiple joints    • Skin cancer    • Type 2 diabetes mellitus (HCC)       Past Surgical History:   Procedure Laterality Date   • ANKLE SURGERY     • CATARACT EXTRACTION     • CHOLECYSTECTOMY     • CLEFT PALATE REPAIR     • FOOT SURGERY     • HYSTERECTOMY     • TONSILLECTOMY       Family History   Problem Relation Age of Onset   • Stroke Mother    • Diabetes Mother    • Heart attack Father    • Breast cancer Sister    • Hypertension Sister    • Lung cancer Brother    • Hypertension Brother    • Heart attack Brother      Social History     Tobacco Use   • Smoking status: Never   • Smokeless tobacco: Never   Vaping Use   • Vaping Use: Never used   Substance Use Topics   • Alcohol use: No   • Drug use: No     No current facility-administered medications on file prior to encounter.     Current Outpatient Medications on File Prior to Encounter   Medication Sig Dispense Refill   • albuterol sulfate HFA (ProAir HFA) 108 (90 Base) MCG/ACT inhaler Inhale 1 puff Every 4 (Four) Hours As Needed for Wheezing. 18 g 0   • Aspirin 81 MG capsule Take 81 mg by mouth Daily.     • glucose blood test strip Check blood glucose 3-4 times dialy 100 each 4   • Insulin Pen Needle (B-D UF III MINI PEN NEEDLES) 31G X 5 MM misc USE ONCE DAILY AS DIRECTED 4 x daily. Dx. E11.65 150 each 6   • ONETOUCH DELICA LANCETS 33G misc Check blood glucose 3 times daily DX CODE: e11.65 100 each 5   • Tresiba FlexTouch 200 UNIT/ML solution pen-injector pen injection Inject 24 Units under the skin into the appropriate area as directed Daily. 15 mL 5   • Triamcinolone Acetonide (NASACORT) 55 MCG/ACT nasal inhaler 2 sprays into the nostril(s) as directed by provider Daily As Needed.     • vitamin C (ASCORBIC ACID) 250 MG tablet Take 500 mg by mouth Daily.     • amLODIPine (NORVASC) 5 MG tablet Take 1 tablet by mouth 2 (Two) Times a Day. 60 tablet 1   • apixaban (ELIQUIS) 5 MG tablet tablet Take 1 tablet by mouth Every 12 (Twelve) Hours. Indications: Atrial Fibrillation (Patient taking differently: Take 5 mg by mouth Every 12  (Twelve) Hours. Currently holding due to eye issues  Indications: Atrial Fibrillation) 60 tablet 1   • Insulin Lispro, 1 Unit Dial, (HumaLOG KwikPen) 100 UNIT/ML solution pen-injector Use as directed for meal & correction coverage up to 50 units a day. 6 units plus scale based on glucose of 1 units :30 mg/dl glucose over 140 mg/dl glucose 45 mL 2   • lisinopril (PRINIVIL,ZESTRIL) 40 MG tablet Take 1 tablet by mouth Daily. 30 tablet 1   • metFORMIN (GLUCOPHAGE) 1000 MG tablet Take 1 tablet by mouth Daily With Breakfast. 90 tablet 1   • Omeprazole 20 MG tablet delayed-release Take  by mouth.       Allergies   Allergen Reactions   • Sulfa Antibiotics Swelling   • Atorvastatin Other (See Comments)     Body aches   • Cycloset [Bromocriptine] Other (See Comments)     Sores in mouth and fatigue   • Invokana [Canagliflozin] Rash     rash   • Oxycodone-Acetaminophen GI Intolerance   • Propoxyphene-Acetaminophen GI Intolerance   • Victoza [Liraglutide] Rash       Objective    Objective     Vital Signs  Temp:  [97.1 °F (36.2 °C)-98 °F (36.7 °C)] 98 °F (36.7 °C)  Heart Rate:  [] 106  Resp:  [16-18] 18  BP: (104-171)/(62-91) 139/73  SpO2:  [96 %-100 %] 99 %  on   ;   Device (Oxygen Therapy): room air  Body mass index is 30.76 kg/m².    Physical Exam  Constitutional:       General: She is not in acute distress.     Appearance: She is not toxic-appearing.      Comments: Generally weak, elderly, frail   HENT:      Head: Normocephalic and atraumatic.   Eyes:      Extraocular Movements: Extraocular movements intact.      Conjunctiva/sclera: Conjunctivae normal.   Cardiovascular:      Rate and Rhythm: Tachycardia present.      Heart sounds: Normal heart sounds.   Pulmonary:      Effort: Pulmonary effort is normal.      Comments: Diminished on expiration anteriorly  Abdominal:      Palpations: Abdomen is soft.      Tenderness: There is abdominal tenderness (Generalized).   Musculoskeletal:         General: Tenderness (BLE)  present.      Cervical back: Normal range of motion and neck supple.      Right lower leg: No edema.      Left lower leg: No edema.   Skin:     General: Skin is warm and dry.   Neurological:      Mental Status: She is alert and oriented to person, place, and time.      Cranial Nerves: No cranial nerve deficit.   Psychiatric:         Behavior: Behavior normal.         Thought Content: Thought content normal.         Results Review:  I reviewed the patient's new clinical results.  I reviewed the patient's new imaging results and agree with the interpretation.  I reviewed the patient's other test results and agree with the interpretation  I personally viewed and interpreted the patient's EKG/Telemetry data  Discussed with ED provider.    Lab Results (last 24 hours)     Procedure Component Value Units Date/Time    CBC & Differential [[id number]]  (Abnormal) Collected: 12/26/22 0205    Specimen: Blood Updated: 12/26/22 0234    Narrative:      The following orders were created for panel order CBC & Differential.  Procedure                               Abnormality         Status                     ---------                               -----------         ------                     CBC Auto Differential[[id number]]        Abnormal            Final result                 Please view results for these tests on the individual orders.    Comprehensive Metabolic Panel [[id number]]  (Abnormal) Collected: 12/26/22 0205    Specimen: Blood Updated: 12/26/22 0309     Glucose 519 mg/dL      BUN 27 mg/dL      Creatinine 1.02 mg/dL      Sodium 135 mmol/L      Potassium 4.2 mmol/L      Chloride 100 mmol/L      CO2 25.6 mmol/L      Calcium 8.8 mg/dL      Total Protein 5.5 g/dL      Albumin 3.30 g/dL      ALT (SGPT) 13 U/L      AST (SGOT) 13 U/L      Alkaline Phosphatase 107 U/L      Total Bilirubin 0.2 mg/dL      Globulin 2.2 gm/dL      A/G Ratio 1.5 g/dL      BUN/Creatinine Ratio 26.5     Anion Gap 9.4 mmol/L      eGFR 55.0 mL/min/1.73       Comment: National Kidney Foundation and American Society of Nephrology (ASN) Task Force recommended calculation based on the Chronic Kidney Disease Epidemiology Collaboration (CKD-EPI) equation refit without adjustment for race.       Narrative:      GFR Normal >60  Chronic Kidney Disease <60  Kidney Failure <15    The GFR formula is only valid for adults with stable renal function between ages 18 and 70.    Protime-INR [[id number]]  (Normal) Collected: 12/26/22 0205    Specimen: Blood Updated: 12/26/22 0243     Protime 13.4 Seconds      INR 1.01    aPTT [[id number]]  (Normal) Collected: 12/26/22 0205    Specimen: Blood Updated: 12/26/22 0243     PTT 25.8 seconds     CBC Auto Differential [[id number]]  (Abnormal) Collected: 12/26/22 0205    Specimen: Blood Updated: 12/26/22 0234     WBC 7.18 10*3/mm3      RBC 3.23 10*6/mm3      Hemoglobin 9.5 g/dL      Hematocrit 28.5 %      MCV 88.2 fL      MCH 29.4 pg      MCHC 33.3 g/dL      RDW 11.8 %      RDW-SD 38.9 fl      MPV 10.4 fL      Platelets 220 10*3/mm3      Neutrophil % 71.8 %      Lymphocyte % 19.2 %      Monocyte % 7.9 %      Eosinophil % 0.6 %      Basophil % 0.4 %      Immature Grans % 0.1 %      Neutrophils, Absolute 5.15 10*3/mm3      Lymphocytes, Absolute 1.38 10*3/mm3      Monocytes, Absolute 0.57 10*3/mm3      Eosinophils, Absolute 0.04 10*3/mm3      Basophils, Absolute 0.03 10*3/mm3      Immature Grans, Absolute 0.01 10*3/mm3      nRBC 0.0 /100 WBC     Hemoglobin & Hematocrit, Blood [[id number]]  (Abnormal) Collected: 12/26/22 0556    Specimen: Blood Updated: 12/26/22 0609     Hemoglobin 8.1 g/dL      Hematocrit 24.9 %     Basic Metabolic Panel [[id number]]  (Abnormal) Collected: 12/26/22 0556    Specimen: Blood Updated: 12/26/22 0630     Glucose 201 mg/dL      BUN 24 mg/dL      Creatinine 0.91 mg/dL      Sodium 139 mmol/L      Potassium 3.4 mmol/L      Chloride 105 mmol/L      CO2 25.9 mmol/L      Calcium 8.4 mg/dL      BUN/Creatinine Ratio 26.4     Anion  Gap 8.1 mmol/L      eGFR 63.1 mL/min/1.73      Comment: National Kidney Foundation and American Society of Nephrology (ASN) Task Force recommended calculation based on the Chronic Kidney Disease Epidemiology Collaboration (CKD-EPI) equation refit without adjustment for race.       Narrative:      GFR Normal >60  Chronic Kidney Disease <60  Kidney Failure <15    The GFR formula is only valid for adults with stable renal function between ages 18 and 70.    CBC Auto Differential [[id number]]  (Abnormal) Collected: 12/26/22 0556    Specimen: Blood Updated: 12/26/22 0609     WBC 6.65 10*3/mm3      RBC 2.80 10*6/mm3      Hemoglobin 8.1 g/dL      Hematocrit 24.9 %      MCV 88.9 fL      MCH 28.9 pg      MCHC 32.5 g/dL      RDW 12.5 %      RDW-SD 40.2 fl      MPV 9.8 fL      Platelets 191 10*3/mm3      Neutrophil % 74.0 %      Lymphocyte % 21.7 %      Monocyte % 3.3 %      Eosinophil % 0.5 %      Basophil % 0.3 %      Immature Grans % 0.2 %      Neutrophils, Absolute 4.93 10*3/mm3      Lymphocytes, Absolute 1.44 10*3/mm3      Monocytes, Absolute 0.22 10*3/mm3      Eosinophils, Absolute 0.03 10*3/mm3      Basophils, Absolute 0.02 10*3/mm3      Immature Grans, Absolute 0.01 10*3/mm3      nRBC 0.0 /100 WBC     Hemoglobin & Hematocrit, Blood [[id number]]  (Abnormal) Collected: 12/26/22 0758    Specimen: Blood Updated: 12/26/22 0830     Hemoglobin 7.7 g/dL      Hematocrit 23.5 %     POC Glucose Once [[id number]]  (Normal) Collected: 12/26/22 0839    Specimen: Blood Updated: 12/26/22 0840     Glucose 114 mg/dL      Comment: Meter: [id number] : [id number] Ferdinand HARRIS       POC Glucose Once [[id number]]  (Normal) Collected: 12/26/22 1144    Specimen: Blood Updated: 12/26/22 1147     Glucose 96 mg/dL      Comment: Meter: [id number] : [id number] Ferdinand HARRIS       CBC (No Diff) [[id number]]  (Abnormal) Collected: 12/26/22 1406    Specimen: Blood Updated: 12/26/22 1424     WBC 7.53 10*3/mm3      RBC 2.98 10*6/mm3       Hemoglobin 8.8 g/dL      Hematocrit 25.2 %      MCV 84.6 fL      MCH 29.5 pg      MCHC 34.9 g/dL      RDW 12.4 %      RDW-SD 38.2 fl      MPV 9.8 fL      Platelets 182 10*3/mm3     D-dimer, Quantitative [[id number]]  (Normal) Collected: 12/26/22 1406    Specimen: Blood Updated: 12/26/22 1432     D-Dimer, Quantitative 0.45 MCGFEU/mL     Narrative:      According to the assay 's published package insert, a normal (<0.50 MCGFEU/mL) D-dimer result in conjunction with a non-high clinical probability assessment, excludes deep vein thrombosis (DVT) and pulmonary embolism (PE) with high sensitivity.    D-dimer values increase with age and this can make VTE exclusion of an older population difficult. To address this, the American College of [employer], based on best available evidence and recent guidelines, recommends that clinicians use age-adjusted D-dimer thresholds in patients greater than 50 years of age with: a) a low probability of PE who do not meet all Pulmonary Embolism Rule Out Criteria, or b) in those with intermediate probability of PE.   The formula for an age-adjusted D-dimer cut-off is \"age/100\".  For example, a 60 year old patient would have an age-adjusted cut-off of 0.60 MCGFEU/mL and an 80 year old 0.80 MCGFEU/mL.          Imaging Results (Last 24 Hours)     Procedure Component Value Units Date/Time    XR Spine Lumbar Complete With Flex & Ext [[id number]] Resulted: 12/26/22 1420     Updated: 12/26/22 1451    XR Hip With or Without Pelvis 2 - 3 View Left [[id number]] Resulted: 12/26/22 1420     Updated: 12/26/22 1450    CT Abdomen Pelvis With Contrast [[id number]] Collected: 12/26/22 0436     Updated: 12/26/22 0455    Narrative:      CT OF THE ABDOMEN AND PELVIS WITH CONTRAST     HISTORY: Left lower quadrant pain     COMPARISON: 03/18/2019     TECHNIQUE: Axial CT imaging was obtained through the abdomen and pelvis.  IV contrast was administered.     FINDINGS:  Images through the lung bases do not  demonstrate any acute  abnormalities. Images are degraded by motion artifact. There is a small  enhancing focus identified within the right hepatic lobe. It measures up  to 1 cm in size. Exact etiology is uncertain. It may reflect a small  portal-hepatic venous fistula, but is favored to be benign. There is a  small hiatal hernia. Duodenum appears unremarkable. There is a right  adrenal nodule. This appears to be stable in size to 2019. Left adrenal  gland and spleen are normal. Gallbladder is surgically absent. Pancreas  is atrophic. The kidneys enhance symmetrically. No hydronephrosis is  seen. There is an area of cortical scarring with associated dystrophic  calcification in the right kidney. There is a simple appearing left  renal cyst. Urinary bladder appears somewhat thick-walled. Some of this  may be related to incomplete distention, but correlation with urinalysis  and urine cultures is recommended. Uterus is surgically absent. There is  no bowel obstruction. There is colonic diverticulosis. I don't see  evidence of diverticulitis. There is potentially a thick-walled  appearance to the patient's rectum. Some of the appearance may be  related to incomplete distention, although some proctitis is not  excluded. There is no evidence of appendicitis. No acute osseous  abnormalities are seen. There is lumbar scoliosis, with convexity to the  right.       Impression:         1. Colonic diverticulosis.  2. There is a somewhat thick-walled appearance to the patient's rectum.  This may be related to incomplete distention. Proctitis is not  completely excluded.  3. Thick-walled appearance to the urinary bladder. Correlation with  urinalysis and cultures is recommended.  4. Small enhancing focus within the right hepatic lobe is indeterminate,  although favored to be benign. It is in proximity to both portal and  hepatic venous branches, and may reflect a small fistula.     Radiation dose reduction techniques were  utilized, including automated  exposure control and exposure modulation based on body size.     This report was finalized on 12/26/2022 4:52 AM by Dr. Katerin Gerber M.D.             Results for orders placed during the hospital encounter of 07/18/20    Adult Transthoracic Echo Complete W/ Cont if Necessary Per Protocol    Interpretation Summary  · Calculated EF = 69.0%.  · Left ventricular systolic function is normal.  · Mild aortic valve stenosis is present.  · Aortic valve area is 1.4 cm2.  · Aortic valve mean pressure gradient is 8.0 mmHg.  · Aortic valve maximum pressure gradient is 18.8 mmHg.  · Aortic valve dimensionless index is 0.6.  · Mild mitral valve regurgitation is present  · Mild tricuspid valve regurgitation is present.  · Estimated right ventricular systolic pressure from tricuspid regurgitation is mildly elevated (35-45 mmHg).  · Calculated right ventricular systolic pressure from tricuspid regurgitation is 37 mmHg.  · Left ventricular diastolic dysfunction (grade I) consistent with impaired relaxation.  · Saline test results are negative.      No orders to display        Assessment/Plan     Active Hospital Problems    Diagnosis  POA   • **Acute lower GI bleeding [K92.2]  Yes   • Stage 3a chronic kidney disease (HCC) [N18.31]  Yes   • Acute blood loss anemia [D62]  Yes   • Abnormal CT of the abdomen [R93.5]  Yes   • Left hip pain [M25.552]  Yes   • Pain in both lower extremities [M79.604, M79.605]  Yes   • Essential hypertension [I10]  Yes   • Type 2 diabetes mellitus with hyperglycemia, with long-term current use of insulin (HCC) [E11.65, Z79.4]  Not Applicable      Resolved Hospital Problems   No resolved problems to display.       Ms. Gramajo is a 82 y.o. never smoker with a history of HLD, DM II, asthma, HTN who presents to [facility] ER with chief complaint of rectal bleeding & admitted for acute lower GI bleeding.      Acute lower GI bleeding / abnormal CT abdomen:  Ordering stool hemoccult.   AC on hold.  GI following plan [facility] on 12/27/2022.  Serial H&H.  Provide one unit pRBC now.        Acute blood loss anemia: Normocytic, normochromic.  Serial hemoglobin trending downward with approximately 2 gram loss in 5 hours.  See plan above.  PPI.      Left hip pain / bilateral lower extremity pain:  Describes chronic pain yet debilitating.  Ordering XR left hip & lumbar spine.  Dimer although doubtful acute DVT given chronic AC.  No evidence of cellulitis or lymphedema.  Suspect osteoarthritic changes & diabetic neuropathy, respectively.  Ordering low dose gabapentin 100 mg BID for now & monitor response.  Falls precautions.  PT /OT consult.      Essential hypertension:  BP acceptable acutely.  Resume amlodipine & hold ACE for now.  Monitor BP trend.      Type 2 diabetes mellitus with hyperglycemia, with long-term current use of insulin (HCC):  A1c in a.m. provide low-dose correctional sliding scale for now & Lantus 20 units subcu daily.      Stage 3a chronic kidney disease (HCC):  Serum creatinine appears at baseline.  Avoid nephrotoxins.  Monitor labs.     · I discussed the patient's findings and my recommendations with patient, RN, & Dr. Dominguez.    VTE Prophylaxis - SCDs.  Code Status - Full code.       WALTER Hoover  [facility]  12/26/22  15:45 EST

## 2022-12-26 NOTE — H&P (VIEW-ONLY)
[facility]  Initial Inpatient Consult Note    Referring Provider: Dr. Dominguez    Reason for Consultation: GI bleed    Subjective     History of present illness:    82 y.o. female with no significant GI past medical history presents with sudden onset of lower GI bleeding began yesterday.  Multiple episodes.  Abdominal bloating and cramping lower quadrants does not radiate worse with movement better at rest.  She never had pain like this before.  Pain is resolving.  Rectal bleeding is slowing.  There is no nausea vomiting or hematemesis.  Her last colonoscopy was greater than 10 years ago reportedly WNL    She takes Eliquis last dose yesterday    Past Medical History:  Past Medical History:   Diagnosis Date   • Cataract    • Chest pain    • Diverticulitis    • Essential hypertension    • Fibromyalgia    • Hyperlipidemia    • Mild intermittent asthma without complication    • Osteoarthritis of multiple joints    • Skin cancer    • Type 2 diabetes mellitus (HCC)      Past Surgical History:  Past Surgical History:   Procedure Laterality Date   • ANKLE SURGERY     • CATARACT EXTRACTION     • CHOLECYSTECTOMY     • CLEFT PALATE REPAIR     • FOOT SURGERY     • HYSTERECTOMY     • TONSILLECTOMY        Social History:   Social History     Tobacco Use   • Smoking status: Never   • Smokeless tobacco: Never   Substance Use Topics   • Alcohol use: No      Family History:  Family History   Problem Relation Age of Onset   • Stroke Mother    • Diabetes Mother    • Heart attack Father    • Breast cancer Sister    • Hypertension Sister    • Lung cancer Brother    • Hypertension Brother    • Heart attack Brother        Home Meds:  Medications Prior to Admission   Medication Sig Dispense Refill Last Dose   • albuterol sulfate HFA (ProAir HFA) 108 (90 Base) MCG/ACT inhaler Inhale 1 puff Every 4 (Four) Hours As Needed for Wheezing. 18 g 0    • Aspirin 81 MG capsule Take 81 mg by mouth Daily.      • glucose blood test  strip Check blood glucose 3-4 times dialy 100 each 4    • Insulin Pen Needle (B-D UF III MINI PEN NEEDLES) 31G X 5 MM misc USE ONCE DAILY AS DIRECTED 4 x daily. Dx. E11.65 150 each 6    • ONETOUCH DELICA LANCETS 33G misc Check blood glucose 3 times daily DX CODE: e11.65 100 each 5    • Tresiba FlexTouch 200 UNIT/ML solution pen-injector pen injection Inject 24 Units under the skin into the appropriate area as directed Daily. 15 mL 5    • Triamcinolone Acetonide (NASACORT) 55 MCG/ACT nasal inhaler 2 sprays into the nostril(s) as directed by provider Daily As Needed.      • vitamin C (ASCORBIC ACID) 250 MG tablet Take 500 mg by mouth Daily.      • amLODIPine (NORVASC) 5 MG tablet Take 1 tablet by mouth 2 (Two) Times a Day. 60 tablet 1    • apixaban (ELIQUIS) 5 MG tablet tablet Take 1 tablet by mouth Every 12 (Twelve) Hours. Indications: Atrial Fibrillation (Patient taking differently: Take 5 mg by mouth Every 12 (Twelve) Hours. Currently holding due to eye issues  Indications: Atrial Fibrillation) 60 tablet 1    • Insulin Lispro, 1 Unit Dial, (HumaLOG KwikPen) 100 UNIT/ML solution pen-injector Use as directed for meal & correction coverage up to 50 units a day. 6 units plus scale based on glucose of 1 units :30 mg/dl glucose over 140 mg/dl glucose 45 mL 2    • lisinopril (PRINIVIL,ZESTRIL) 40 MG tablet Take 1 tablet by mouth Daily. 30 tablet 1    • metFORMIN (GLUCOPHAGE) 1000 MG tablet Take 1 tablet by mouth Daily With Breakfast. 90 tablet 1    • Omeprazole 20 MG tablet delayed-release Take  by mouth.        Current Meds:   Insulin Degludec, 20 Units, Subcutaneous, Daily  insulin lispro, 0-7 Units, Subcutaneous, TID AC  Morphine, 2 mg, Intravenous, Once  ondansetron, 4 mg, Intravenous, Once  pantoprazole, 40 mg, Intravenous, Q AM  sodium chloride, 10 mL, Intravenous, Q12H      Allergies:  Allergies   Allergen Reactions   • Sulfa Antibiotics Swelling   • Atorvastatin Other (See Comments)     Body aches   • Cycloset  [Bromocriptine] Other (See Comments)     Sores in mouth and fatigue   • Invokana [Canagliflozin] Rash     rash   • Oxycodone-Acetaminophen GI Intolerance   • Propoxyphene-Acetaminophen GI Intolerance   • Victoza [Liraglutide] Rash     Review of Systems  There is weakness of fatigue all other systems reviewed and     Objective     Vital Signs  Temp:  [97.3 °F (36.3 °C)-98 °F (36.7 °C)] (P) 98 °F (36.7 °C)  Heart Rate:  [] (P) 96  Resp:  [16-18] (P) 16  BP: (104-171)/(62-91) (P) 115/72  Physical Exam:  General Appearance:    Alert, cooperative, in no acute distress   Head:    Normocephalic, without obvious abnormality, atraumatic   Eyes:          conjunctivae and sclerae normal, no   icterus   Throat:   no thrush, oral mucosa moist   Neck:   Supple, no adenopathy   Lungs:     Clear to auscultation bilaterally    Heart:    Regular rhythm and normal rate    Chest Wall:    No abnormalities observed   Abdomen:     Soft, nondistended, nontender; normal bowel sounds   Extremities:   no edema, no redness   Skin:   No bruising or rash   Psychiatric:  normal mood and insight     Results Review:   I reviewed the patient's new clinical results.    Results from last 7 days   Lab Units 12/26/22  0758 12/26/22  0556 12/26/22  0205   WBC 10*3/mm3  --  6.65 7.18   HEMOGLOBIN g/dL 7.7* 8.1*  8.1* 9.5*   HEMATOCRIT % 23.5* 24.9*  24.9* 28.5*   PLATELETS 10*3/mm3  --  191 220     Results from last 7 days   Lab Units 12/26/22  0556 12/26/22  0205   SODIUM mmol/L 139 135*   POTASSIUM mmol/L 3.4* 4.2   CHLORIDE mmol/L 105 100   CO2 mmol/L 25.9 25.6   BUN mg/dL 24* 27*   CREATININE mg/dL 0.91 1.02*   CALCIUM mg/dL 8.4* 8.8   BILIRUBIN mg/dL  --  0.2   ALK PHOS U/L  --  107   ALT (SGPT) U/L  --  13   AST (SGOT) U/L  --  13   GLUCOSE mg/dL 201* 519*     Results from last 7 days   Lab Units 12/26/22  0205   INR  1.01     No results found for: LIPASE    Radiology:  CT Abdomen Pelvis With Contrast   Final Result       1. Colonic  diverticulosis.   2. There is a somewhat thick-walled appearance to the patient's rectum.   This may be related to incomplete distention. Proctitis is not   completely excluded.   3. Thick-walled appearance to the urinary bladder. Correlation with   urinalysis and cultures is recommended.   4. Small enhancing focus within the right hepatic lobe is indeterminate,   although favored to be benign. It is in proximity to both portal and   hepatic venous branches, and may reflect a small fistula.       Radiation dose reduction techniques were utilized, including automated   exposure control and exposure modulation based on body size.       This report was finalized on 12/26/2022 4:52 AM by Dr. Katerin Gerber M.D.              Assessment & Plan   Patient Active Problem List   Diagnosis   • Allergic rhinitis   • GERD without esophagitis   • Essential hypertension   • Osteoarthritis of knee   • Type 2 diabetes mellitus with hyperglycemia, with long-term current use of insulin (HCC)   • Open-angle glaucoma   • Nonproliferative diabetic retinopathy of both eyes (HCC)   • Hyperlipidemia   • Long-term insulin use (HCC)   • Mild intermittent asthma without complication   • Osteoarthritis of multiple joints   • Vitamin D deficiency   • Diabetic autonomic neuropathy associated with type 2 diabetes mellitus (HCC)   • Neutropenia (HCC)   • Paroxysmal atrial fibrillation (HCC)   • Pancytopenia (HCC)   • Dysfunctional grieving   • Acute lower GI bleeding   • Stage 3a chronic kidney disease (HCC)   • Acute blood loss anemia   • Abnormal CT of the abdomen       Assessment:  1. Rectal bleeding  2. Anemia    Plan:  · Hold Eliquis  · Colonoscopy tomorrow  · Serial hemoglobin  · Transfusion per primary team      I discussed the patients findings and my recommendations with patient and nursing staff.    Daryl Kaiser MD

## 2022-12-26 NOTE — CONSULTS
[facility]  Initial Inpatient Consult Note    Referring Provider: Dr. Dominguez    Reason for Consultation: GI bleed    Subjective     History of present illness:    82 y.o. female with no significant GI past medical history presents with sudden onset of lower GI bleeding began yesterday.  Multiple episodes.  Abdominal bloating and cramping lower quadrants does not radiate worse with movement better at rest.  She never had pain like this before.  Pain is resolving.  Rectal bleeding is slowing.  There is no nausea vomiting or hematemesis.  Her last colonoscopy was greater than 10 years ago reportedly WNL    She takes Eliquis last dose yesterday    Past Medical History:  Past Medical History:   Diagnosis Date   • Cataract    • Chest pain    • Diverticulitis    • Essential hypertension    • Fibromyalgia    • Hyperlipidemia    • Mild intermittent asthma without complication    • Osteoarthritis of multiple joints    • Skin cancer    • Type 2 diabetes mellitus (HCC)      Past Surgical History:  Past Surgical History:   Procedure Laterality Date   • ANKLE SURGERY     • CATARACT EXTRACTION     • CHOLECYSTECTOMY     • CLEFT PALATE REPAIR     • FOOT SURGERY     • HYSTERECTOMY     • TONSILLECTOMY        Social History:   Social History     Tobacco Use   • Smoking status: Never   • Smokeless tobacco: Never   Substance Use Topics   • Alcohol use: No      Family History:  Family History   Problem Relation Age of Onset   • Stroke Mother    • Diabetes Mother    • Heart attack Father    • Breast cancer Sister    • Hypertension Sister    • Lung cancer Brother    • Hypertension Brother    • Heart attack Brother        Home Meds:  Medications Prior to Admission   Medication Sig Dispense Refill Last Dose   • albuterol sulfate HFA (ProAir HFA) 108 (90 Base) MCG/ACT inhaler Inhale 1 puff Every 4 (Four) Hours As Needed for Wheezing. 18 g 0    • Aspirin 81 MG capsule Take 81 mg by mouth Daily.      • glucose blood test  strip Check blood glucose 3-4 times dialy 100 each 4    • Insulin Pen Needle (B-D UF III MINI PEN NEEDLES) 31G X 5 MM misc USE ONCE DAILY AS DIRECTED 4 x daily. Dx. E11.65 150 each 6    • ONETOUCH DELICA LANCETS 33G misc Check blood glucose 3 times daily DX CODE: e11.65 100 each 5    • Tresiba FlexTouch 200 UNIT/ML solution pen-injector pen injection Inject 24 Units under the skin into the appropriate area as directed Daily. 15 mL 5    • Triamcinolone Acetonide (NASACORT) 55 MCG/ACT nasal inhaler 2 sprays into the nostril(s) as directed by provider Daily As Needed.      • vitamin C (ASCORBIC ACID) 250 MG tablet Take 500 mg by mouth Daily.      • amLODIPine (NORVASC) 5 MG tablet Take 1 tablet by mouth 2 (Two) Times a Day. 60 tablet 1    • apixaban (ELIQUIS) 5 MG tablet tablet Take 1 tablet by mouth Every 12 (Twelve) Hours. Indications: Atrial Fibrillation (Patient taking differently: Take 5 mg by mouth Every 12 (Twelve) Hours. Currently holding due to eye issues  Indications: Atrial Fibrillation) 60 tablet 1    • Insulin Lispro, 1 Unit Dial, (HumaLOG KwikPen) 100 UNIT/ML solution pen-injector Use as directed for meal & correction coverage up to 50 units a day. 6 units plus scale based on glucose of 1 units :30 mg/dl glucose over 140 mg/dl glucose 45 mL 2    • lisinopril (PRINIVIL,ZESTRIL) 40 MG tablet Take 1 tablet by mouth Daily. 30 tablet 1    • metFORMIN (GLUCOPHAGE) 1000 MG tablet Take 1 tablet by mouth Daily With Breakfast. 90 tablet 1    • Omeprazole 20 MG tablet delayed-release Take  by mouth.        Current Meds:   Insulin Degludec, 20 Units, Subcutaneous, Daily  insulin lispro, 0-7 Units, Subcutaneous, TID AC  Morphine, 2 mg, Intravenous, Once  ondansetron, 4 mg, Intravenous, Once  pantoprazole, 40 mg, Intravenous, Q AM  sodium chloride, 10 mL, Intravenous, Q12H      Allergies:  Allergies   Allergen Reactions   • Sulfa Antibiotics Swelling   • Atorvastatin Other (See Comments)     Body aches   • Cycloset  [Bromocriptine] Other (See Comments)     Sores in mouth and fatigue   • Invokana [Canagliflozin] Rash     rash   • Oxycodone-Acetaminophen GI Intolerance   • Propoxyphene-Acetaminophen GI Intolerance   • Victoza [Liraglutide] Rash     Review of Systems  There is weakness of fatigue all other systems reviewed and     Objective     Vital Signs  Temp:  [97.3 °F (36.3 °C)-98 °F (36.7 °C)] (P) 98 °F (36.7 °C)  Heart Rate:  [] (P) 96  Resp:  [16-18] (P) 16  BP: (104-171)/(62-91) (P) 115/72  Physical Exam:  General Appearance:    Alert, cooperative, in no acute distress   Head:    Normocephalic, without obvious abnormality, atraumatic   Eyes:          conjunctivae and sclerae normal, no   icterus   Throat:   no thrush, oral mucosa moist   Neck:   Supple, no adenopathy   Lungs:     Clear to auscultation bilaterally    Heart:    Regular rhythm and normal rate    Chest Wall:    No abnormalities observed   Abdomen:     Soft, nondistended, nontender; normal bowel sounds   Extremities:   no edema, no redness   Skin:   No bruising or rash   Psychiatric:  normal mood and insight     Results Review:   I reviewed the patient's new clinical results.    Results from last 7 days   Lab Units 12/26/22  0758 12/26/22  0556 12/26/22  0205   WBC 10*3/mm3  --  6.65 7.18   HEMOGLOBIN g/dL 7.7* 8.1*  8.1* 9.5*   HEMATOCRIT % 23.5* 24.9*  24.9* 28.5*   PLATELETS 10*3/mm3  --  191 220     Results from last 7 days   Lab Units 12/26/22  0556 12/26/22  0205   SODIUM mmol/L 139 135*   POTASSIUM mmol/L 3.4* 4.2   CHLORIDE mmol/L 105 100   CO2 mmol/L 25.9 25.6   BUN mg/dL 24* 27*   CREATININE mg/dL 0.91 1.02*   CALCIUM mg/dL 8.4* 8.8   BILIRUBIN mg/dL  --  0.2   ALK PHOS U/L  --  107   ALT (SGPT) U/L  --  13   AST (SGOT) U/L  --  13   GLUCOSE mg/dL 201* 519*     Results from last 7 days   Lab Units 12/26/22  0205   INR  1.01     No results found for: LIPASE    Radiology:  CT Abdomen Pelvis With Contrast   Final Result       1. Colonic  diverticulosis.   2. There is a somewhat thick-walled appearance to the patient's rectum.   This may be related to incomplete distention. Proctitis is not   completely excluded.   3. Thick-walled appearance to the urinary bladder. Correlation with   urinalysis and cultures is recommended.   4. Small enhancing focus within the right hepatic lobe is indeterminate,   although favored to be benign. It is in proximity to both portal and   hepatic venous branches, and may reflect a small fistula.       Radiation dose reduction techniques were utilized, including automated   exposure control and exposure modulation based on body size.       This report was finalized on 12/26/2022 4:52 AM by Dr. Katerin Gerber M.D.              Assessment & Plan   Patient Active Problem List   Diagnosis   • Allergic rhinitis   • GERD without esophagitis   • Essential hypertension   • Osteoarthritis of knee   • Type 2 diabetes mellitus with hyperglycemia, with long-term current use of insulin (HCC)   • Open-angle glaucoma   • Nonproliferative diabetic retinopathy of both eyes (HCC)   • Hyperlipidemia   • Long-term insulin use (HCC)   • Mild intermittent asthma without complication   • Osteoarthritis of multiple joints   • Vitamin D deficiency   • Diabetic autonomic neuropathy associated with type 2 diabetes mellitus (HCC)   • Neutropenia (HCC)   • Paroxysmal atrial fibrillation (HCC)   • Pancytopenia (HCC)   • Dysfunctional grieving   • Acute lower GI bleeding   • Stage 3a chronic kidney disease (HCC)   • Acute blood loss anemia   • Abnormal CT of the abdomen       Assessment:  1. Rectal bleeding  2. Anemia    Plan:  · Hold Eliquis  · Colonoscopy tomorrow  · Serial hemoglobin  · Transfusion per primary team      I discussed the patients findings and my recommendations with patient and nursing staff.    Daryl Kaiser MD

## 2022-12-26 NOTE — ED NOTES
Nursing report ED to floor  Antonietta Gramajo  82 y.o.  female    HPI :   Chief Complaint   Patient presents with    Rectal Bleeding       Admitting doctor:   Nesha Ochoa MD    Admitting diagnosis:   The primary encounter diagnosis was Acute lower GI bleeding. Diagnoses of Diverticulosis and Hyperglycemia were also pertinent to this visit.    Code status:   Current Code Status       Date Active Code Status Order ID Comments User Context       12/26/2022 0533 CPR (Attempt to Resuscitate) [id number]  Maddi Montemayor, [facility]        Question Answer    Code Status (Patient has no pulse and is not breathing) CPR (Attempt to Resuscitate)    Medical Interventions (Patient has pulse or is breathing) Full Support                    Allergies:   Sulfa antibiotics, Atorvastatin, Cycloset [bromocriptine], Invokana [canagliflozin], Oxycodone-acetaminophen, Propoxyphene-acetaminophen, and Victoza [liraglutide]    Isolation:   No active isolations    Intake and Output  No intake or output data in the 24 hours ending 12/26/22 0545    Weight:   There were no vitals filed for this visit.    Most recent vitals:   Vitals:    12/26/22 0416 12/26/22 0421 12/26/22 0426 12/26/22 0431   BP: 158/75   150/70   Patient Position:       Pulse:   91    Resp:       Temp:       TempSrc:       SpO2:  100% 99%        Active LDAs/IV Access:   Lines, Drains & Airways       Active LDAs       Name Placement date Placement time Site Days    Peripheral IV 12/26/22 0233 Left Antecubital 12/26/22 0233  Antecubital  less than 1    Peripheral IV 12/26/22 0233 Anterior;Right Antecubital 12/26/22 0233  Antecubital  less than 1                    Labs (abnormal labs have a star):   Labs Reviewed   COMPREHENSIVE METABOLIC PANEL - Abnormal; Notable for the following components:       Result Value    Glucose 519 (*)     BUN 27 (*)     Creatinine 1.02 (*)     Sodium 135 (*)     Total Protein 5.5 (*)     Albumin 3.30 (*)     BUN/Creatinine Ratio 26.5 (*)      eGFR 55.0 (*)     All other components within normal limits    Narrative:     GFR Normal >60  Chronic Kidney Disease <60  Kidney Failure <15    The GFR formula is only valid for adults with stable renal function between ages 18 and 70.   CBC WITH AUTO DIFFERENTIAL - Abnormal; Notable for the following components:    RBC 3.23 (*)     Hemoglobin 9.5 (*)     Hematocrit 28.5 (*)     RDW 11.8 (*)     Lymphocyte % 19.2 (*)     All other components within normal limits   PROTIME-INR - Normal   APTT - Normal   HEMOGLOBIN AND HEMATOCRIT, BLOOD   HEMOGLOBIN AND HEMATOCRIT, BLOOD   BASIC METABOLIC PANEL   CBC WITH AUTO DIFFERENTIAL   POCT GLUCOSE FINGERSTICK   POCT GLUCOSE FINGERSTICK   POCT GLUCOSE FINGERSTICK   TYPE AND SCREEN   CBC AND DIFFERENTIAL    Narrative:     The following orders were created for panel order CBC & Differential.  Procedure                               Abnormality         Status                     ---------                               -----------         ------                     CBC Auto Differential[[id number]]        Abnormal            Final result                 Please view results for these tests on the individual orders.       EKG:   No orders to display       Meds given in ED:   Medications   sodium chloride 0.9 % flush 10 mL (has no administration in time range)   ondansetron (ZOFRAN) injection 4 mg (has no administration in time range)   morphine injection 2 mg (has no administration in time range)   sodium chloride 0.9 % flush 10 mL (has no administration in time range)   sodium chloride 0.9 % flush 10 mL (has no administration in time range)   sodium chloride 0.9 % infusion 40 mL (has no administration in time range)   dextrose (GLUTOSE) oral gel 15 g (has no administration in time range)   dextrose (D50W) (25 g/50 mL) IV injection 25 g (has no administration in time range)   glucagon (human recombinant) (GLUCAGEN DIAGNOSTIC) injection 1 mg (has no administration in time range)    nitroglycerin (NITROSTAT) SL tablet 0.4 mg (has no administration in time range)   acetaminophen (TYLENOL) tablet 650 mg (has no administration in time range)     Or   acetaminophen (TYLENOL) 160 MG/5ML solution 650 mg (has no administration in time range)     Or   acetaminophen (TYLENOL) suppository 650 mg (has no administration in time range)   sodium chloride 0.9 % infusion (has no administration in time range)   insulin lispro (ADMELOG) injection 0-7 Units (has no administration in time range)   ondansetron (ZOFRAN) injection 4 mg (has no administration in time range)   pantoprazole (PROTONIX) injection 40 mg (has no administration in time range)   sodium chloride 0.9 % bolus 1,000 mL ( Intravenous Currently Infusing 12/26/22 0553)   iopamidol (ISOVUE-300) 61 % injection 100 mL (85 mL Intravenous Given 12/26/22 0408)   insulin regular (humuLIN R,novoLIN R) injection 10 Units (10 Units Intravenous Given 12/26/22 0593)       Imaging results:  CT Abdomen Pelvis With Contrast    Result Date: 12/26/2022   1. Colonic diverticulosis. 2. There is a somewhat thick-walled appearance to the patient's rectum. This may be related to incomplete distention. Proctitis is not completely excluded. 3. Thick-walled appearance to the urinary bladder. Correlation with urinalysis and cultures is recommended. 4. Small enhancing focus within the right hepatic lobe is indeterminate, although favored to be benign. It is in proximity to both portal and hepatic venous branches, and may reflect a small fistula.  Radiation dose reduction techniques were utilized, including automated exposure control and exposure modulation based on body size.  This report was finalized on 12/26/2022 4:52 AM by Dr. Katerin Gerber M.D.       Ambulatory status:   - standby     Social issues:   Social History     Socioeconomic History    Marital status:    Tobacco Use    Smoking status: Never    Smokeless tobacco: Never   Vaping Use    Vaping Use:  Never used   Substance and Sexual Activity    Alcohol use: No    Drug use: No    Sexual activity: Defer       NIH Stroke Scale:         Moshe Soto RN  12/26/22 05:45 EST

## 2022-12-27 ENCOUNTER — ANESTHESIA EVENT (OUTPATIENT)
Dept: GASTROENTEROLOGY | Facility: HOSPITAL | Age: 82
DRG: 378 | End: 2022-12-27
Payer: MEDICARE

## 2022-12-27 ENCOUNTER — ANESTHESIA (OUTPATIENT)
Dept: GASTROENTEROLOGY | Facility: HOSPITAL | Age: 82
DRG: 378 | End: 2022-12-27
Payer: MEDICARE

## 2022-12-27 ENCOUNTER — TELEPHONE (OUTPATIENT)
Dept: ENDOCRINOLOGY | Age: 82
End: 2022-12-27

## 2022-12-27 LAB
ALBUMIN SERPL-MCNC: 3.4 G/DL (ref 3.5–5.2)
ALBUMIN/GLOB SERPL: 1.5 G/DL
ALP SERPL-CCNC: 98 U/L (ref 39–117)
ALT SERPL W P-5'-P-CCNC: 13 U/L (ref 1–33)
ANION GAP SERPL CALCULATED.3IONS-SCNC: 8 MMOL/L (ref 5–15)
AST SERPL-CCNC: 18 U/L (ref 1–32)
BASOPHILS # BLD AUTO: 0.04 10*3/MM3 (ref 0–0.2)
BASOPHILS NFR BLD AUTO: 0.5 % (ref 0–1.5)
BH BB BLOOD EXPIRATION DATE: NORMAL
BH BB BLOOD TYPE BARCODE: 5100
BH BB DISPENSE STATUS: NORMAL
BH BB PRODUCT CODE: NORMAL
BH BB UNIT NUMBER: NORMAL
BILIRUB SERPL-MCNC: 0.5 MG/DL (ref 0–1.2)
BUN SERPL-MCNC: 15 MG/DL (ref 8–23)
BUN/CREAT SERPL: 21.7 (ref 7–25)
CALCIUM SPEC-SCNC: 8.3 MG/DL (ref 8.6–10.5)
CHLORIDE SERPL-SCNC: 109 MMOL/L (ref 98–107)
CO2 SERPL-SCNC: 20 MMOL/L (ref 22–29)
CREAT SERPL-MCNC: 0.69 MG/DL (ref 0.57–1)
CROSSMATCH INTERPRETATION: NORMAL
DEPRECATED RDW RBC AUTO: 41.6 FL (ref 37–54)
DEPRECATED RDW RBC AUTO: 41.8 FL (ref 37–54)
EGFRCR SERPLBLD CKD-EPI 2021: 86.8 ML/MIN/1.73
EOSINOPHIL # BLD AUTO: 0.09 10*3/MM3 (ref 0–0.4)
EOSINOPHIL NFR BLD AUTO: 1.1 % (ref 0.3–6.2)
ERYTHROCYTE [DISTWIDTH] IN BLOOD BY AUTOMATED COUNT: 12.7 % (ref 12.3–15.4)
ERYTHROCYTE [DISTWIDTH] IN BLOOD BY AUTOMATED COUNT: 12.8 % (ref 12.3–15.4)
GLOBULIN UR ELPH-MCNC: 2.2 GM/DL
GLUCOSE BLDC GLUCOMTR-MCNC: 152 MG/DL (ref 70–130)
GLUCOSE BLDC GLUCOMTR-MCNC: 156 MG/DL (ref 70–130)
GLUCOSE BLDC GLUCOMTR-MCNC: 211 MG/DL (ref 70–130)
GLUCOSE BLDC GLUCOMTR-MCNC: 253 MG/DL (ref 70–130)
GLUCOSE BLDC GLUCOMTR-MCNC: 53 MG/DL (ref 70–130)
GLUCOSE BLDC GLUCOMTR-MCNC: 66 MG/DL (ref 70–130)
GLUCOSE SERPL-MCNC: 185 MG/DL (ref 65–99)
HCT VFR BLD AUTO: 24.2 % (ref 34–46.6)
HCT VFR BLD AUTO: 25.7 % (ref 34–46.6)
HCT VFR BLD AUTO: 25.9 % (ref 34–46.6)
HCT VFR BLD AUTO: 30.3 % (ref 34–46.6)
HEMOCCULT STL QL: POSITIVE
HGB BLD-MCNC: 10 G/DL (ref 12–15.9)
HGB BLD-MCNC: 8.5 G/DL (ref 12–15.9)
HGB BLD-MCNC: 8.5 G/DL (ref 12–15.9)
HGB BLD-MCNC: 8.9 G/DL (ref 12–15.9)
INR PPP: 0.95 (ref 0.9–1.1)
LYMPHOCYTES # BLD AUTO: 1.21 10*3/MM3 (ref 0.7–3.1)
LYMPHOCYTES NFR BLD AUTO: 14.2 % (ref 19.6–45.3)
MCH RBC QN AUTO: 29.4 PG (ref 26.6–33)
MCH RBC QN AUTO: 29.9 PG (ref 26.6–33)
MCHC RBC AUTO-ENTMCNC: 32.8 G/DL (ref 31.5–35.7)
MCHC RBC AUTO-ENTMCNC: 33 G/DL (ref 31.5–35.7)
MCV RBC AUTO: 89.6 FL (ref 79–97)
MCV RBC AUTO: 90.4 FL (ref 79–97)
MONOCYTES # BLD AUTO: 0.5 10*3/MM3 (ref 0.1–0.9)
MONOCYTES NFR BLD AUTO: 5.8 % (ref 5–12)
NEUTROPHILS NFR BLD AUTO: 6.66 10*3/MM3 (ref 1.7–7)
NEUTROPHILS NFR BLD AUTO: 77.8 % (ref 42.7–76)
PLATELET # BLD AUTO: 192 10*3/MM3 (ref 140–450)
PLATELET # BLD AUTO: 195 10*3/MM3 (ref 140–450)
PMV BLD AUTO: 10.4 FL (ref 6–12)
PMV BLD AUTO: 10.6 FL (ref 6–12)
POTASSIUM SERPL-SCNC: 4.2 MMOL/L (ref 3.5–5.2)
PROT SERPL-MCNC: 5.6 G/DL (ref 6–8.5)
PROTHROMBIN TIME: 12.7 SECONDS (ref 11.7–14.2)
RBC # BLD AUTO: 2.89 10*6/MM3 (ref 3.77–5.28)
RBC # BLD AUTO: 3.35 10*6/MM3 (ref 3.77–5.28)
SODIUM SERPL-SCNC: 137 MMOL/L (ref 136–145)
UNIT  ABO: NORMAL
UNIT  RH: NORMAL
WBC NRBC COR # BLD: 8.55 10*3/MM3 (ref 3.4–10.8)
WBC NRBC COR # BLD: 9.78 10*3/MM3 (ref 3.4–10.8)

## 2022-12-27 PROCEDURE — 45388 COLONOSCOPY W/ABLATION: CPT | Performed by: INTERNAL MEDICINE

## 2022-12-27 PROCEDURE — 85027 COMPLETE CBC AUTOMATED: CPT | Performed by: INTERNAL MEDICINE

## 2022-12-27 PROCEDURE — 82272 OCCULT BLD FECES 1-3 TESTS: CPT | Performed by: NURSE PRACTITIONER

## 2022-12-27 PROCEDURE — 85610 PROTHROMBIN TIME: CPT | Performed by: INTERNAL MEDICINE

## 2022-12-27 PROCEDURE — 85018 HEMOGLOBIN: CPT | Performed by: NURSE PRACTITIONER

## 2022-12-27 PROCEDURE — 0DBN8ZX EXCISION OF SIGMOID COLON, VIA NATURAL OR ARTIFICIAL OPENING ENDOSCOPIC, DIAGNOSTIC: ICD-10-PCS | Performed by: INTERNAL MEDICINE

## 2022-12-27 PROCEDURE — 0W3P8ZZ CONTROL BLEEDING IN GASTROINTESTINAL TRACT, VIA NATURAL OR ARTIFICIAL OPENING ENDOSCOPIC: ICD-10-PCS | Performed by: INTERNAL MEDICINE

## 2022-12-27 PROCEDURE — 25010000002 PROPOFOL 10 MG/ML EMULSION: Performed by: NURSE ANESTHETIST, CERTIFIED REGISTERED

## 2022-12-27 PROCEDURE — 88342 IMHCHEM/IMCYTCHM 1ST ANTB: CPT

## 2022-12-27 PROCEDURE — 85014 HEMATOCRIT: CPT | Performed by: NURSE PRACTITIONER

## 2022-12-27 PROCEDURE — 85025 COMPLETE CBC W/AUTO DIFF WBC: CPT | Performed by: INTERNAL MEDICINE

## 2022-12-27 PROCEDURE — 80053 COMPREHEN METABOLIC PANEL: CPT | Performed by: INTERNAL MEDICINE

## 2022-12-27 PROCEDURE — 45385 COLONOSCOPY W/LESION REMOVAL: CPT | Performed by: INTERNAL MEDICINE

## 2022-12-27 PROCEDURE — 45380 COLONOSCOPY AND BIOPSY: CPT | Performed by: INTERNAL MEDICINE

## 2022-12-27 PROCEDURE — 63710000001 INSULIN LISPRO (HUMAN) PER 5 UNITS: Performed by: NURSE PRACTITIONER

## 2022-12-27 PROCEDURE — 82962 GLUCOSE BLOOD TEST: CPT

## 2022-12-27 PROCEDURE — 88305 TISSUE EXAM BY PATHOLOGIST: CPT | Performed by: INTERNAL MEDICINE

## 2022-12-27 PROCEDURE — 36415 COLL VENOUS BLD VENIPUNCTURE: CPT | Performed by: NURSE PRACTITIONER

## 2022-12-27 DEVICE — DEV CLIP ENDO RESOLUTION360 CONTRL ROT 235CM: Type: IMPLANTABLE DEVICE | Site: SIGMOID COLON | Status: FUNCTIONAL

## 2022-12-27 RX ORDER — SODIUM CHLORIDE 9 MG/ML
1000 INJECTION, SOLUTION INTRAVENOUS CONTINUOUS
Status: ACTIVE | OUTPATIENT
Start: 2022-12-27 | End: 2022-12-29

## 2022-12-27 RX ORDER — SODIUM CHLORIDE 0.9 % (FLUSH) 0.9 %
10 SYRINGE (ML) INJECTION AS NEEDED
Status: DISCONTINUED | OUTPATIENT
Start: 2022-12-27 | End: 2022-12-27 | Stop reason: HOSPADM

## 2022-12-27 RX ORDER — PROPOFOL 10 MG/ML
VIAL (ML) INTRAVENOUS AS NEEDED
Status: DISCONTINUED | OUTPATIENT
Start: 2022-12-27 | End: 2022-12-27 | Stop reason: SURG

## 2022-12-27 RX ORDER — LIDOCAINE HYDROCHLORIDE 10 MG/ML
0.5 INJECTION, SOLUTION INFILTRATION; PERINEURAL ONCE AS NEEDED
Status: DISCONTINUED | OUTPATIENT
Start: 2022-12-27 | End: 2022-12-27 | Stop reason: HOSPADM

## 2022-12-27 RX ORDER — LIDOCAINE HYDROCHLORIDE 20 MG/ML
INJECTION, SOLUTION INFILTRATION; PERINEURAL AS NEEDED
Status: DISCONTINUED | OUTPATIENT
Start: 2022-12-27 | End: 2022-12-27 | Stop reason: SURG

## 2022-12-27 RX ORDER — PROPOFOL 10 MG/ML
VIAL (ML) INTRAVENOUS CONTINUOUS PRN
Status: DISCONTINUED | OUTPATIENT
Start: 2022-12-27 | End: 2022-12-27 | Stop reason: SURG

## 2022-12-27 RX ADMIN — SODIUM CHLORIDE 1000 ML: 9 INJECTION, SOLUTION INTRAVENOUS at 10:06

## 2022-12-27 RX ADMIN — GABAPENTIN 100 MG: 100 CAPSULE ORAL at 20:22

## 2022-12-27 RX ADMIN — Medication 10 ML: at 20:22

## 2022-12-27 RX ADMIN — INSULIN LISPRO 2 UNITS: 100 INJECTION, SOLUTION INTRAVENOUS; SUBCUTANEOUS at 16:50

## 2022-12-27 RX ADMIN — Medication 10 ML: at 08:46

## 2022-12-27 RX ADMIN — PANTOPRAZOLE SODIUM 40 MG: 40 INJECTION, POWDER, FOR SOLUTION INTRAVENOUS at 05:52

## 2022-12-27 RX ADMIN — LIDOCAINE 1 PATCH: 50 PATCH TOPICAL at 08:46

## 2022-12-27 RX ADMIN — GABAPENTIN 100 MG: 100 CAPSULE ORAL at 08:48

## 2022-12-27 RX ADMIN — PROPOFOL 30 MG: 10 INJECTION, EMULSION INTRAVENOUS at 11:08

## 2022-12-27 RX ADMIN — Medication 120 MCG/KG/MIN: at 11:05

## 2022-12-27 RX ADMIN — DEXTROSE MONOHYDRATE 25 G: 25 INJECTION, SOLUTION INTRAVENOUS at 19:25

## 2022-12-27 RX ADMIN — PROPOFOL 30 MG: 10 INJECTION, EMULSION INTRAVENOUS at 11:12

## 2022-12-27 RX ADMIN — PROPOFOL 50 MG: 10 INJECTION, EMULSION INTRAVENOUS at 11:04

## 2022-12-27 RX ADMIN — LIDOCAINE HYDROCHLORIDE 30 MG: 20 INJECTION, SOLUTION INFILTRATION; PERINEURAL at 11:04

## 2022-12-27 RX ADMIN — POLYETHYLENE GLYCOL 3350 0.5 BOTTLE: 17 POWDER, FOR SOLUTION ORAL at 05:52

## 2022-12-27 RX ADMIN — AMLODIPINE BESYLATE 5 MG: 5 TABLET ORAL at 08:46

## 2022-12-27 NOTE — PLAN OF CARE
Problem: Adult Inpatient Plan of Care  Goal: Plan of Care Review  Outcome: Ongoing, Progressing  Flowsheets (Taken 12/27/2022 1617)  Progress: no change  Plan of Care Reviewed With: patient   Goal Outcome Evaluation:  Plan of Care Reviewed With: patient        Progress: no change     Pt had a colonoscopy today. She is still having bloody bowel movements. On a clear liquid diet Hgb stable at this time. IV fluids turned down. VSS. Anticipate discharge in the next day or two.

## 2022-12-27 NOTE — CASE MANAGEMENT/SOCIAL WORK
Discharge Planning Assessment  [facility]     Patient Name: Antonietta Gramajo  MRN: [id number]  Today's Date: 12/27/2022    Admit Date: 12/26/2022    Plan: Home with daughter   Discharge Needs Assessment     Row Name 12/27/22 1627       Living Environment    People in Home alone    Current Living Arrangements home    Primary Care Provided by self    Provides Primary Care For no one    Quality of Family Relationships supportive       Resource/Environmental Concerns    Resource/Environmental Concerns none       Transition Planning    Patient/Family Anticipates Transition to home with family       Discharge Needs Assessment    Equipment Currently Used at Home rollator;cane, straight    Concerns to be Addressed no discharge needs identified    Equipment Needed After Discharge none               Discharge Plan     Row Name 12/27/22 1628       Plan    Plan Home with daughter    Patient/Family in Agreement with Plan yes    Plan Comments Met with pt and daughter at bedside. Introduced self, explained CCP role, facesheet verified. Pt lives in house alone and is independent with ADLs.  Uses rollator and canes.  No history of HH or SNF.  Plans to return home at discharge, no identified needs.  Daughter to stay with pt for a few days after discharge and son lives close.  LAWRENCE Islas RN              Continued Care and Services - Admitted Since 12/26/2022    Coordination has not been started for this encounter.       Expected Discharge Date and Time     Expected Discharge Date Expected Discharge Time    Dec 28, 2022          Demographic Summary     Row Name 12/27/22 1623       General Information    Admission Type inpatient    Arrived From home    Referral Source admission list    Reason for Consult discharge planning    Preferred Language English       Contact Information    Permission Granted to Share Info With family/designee  Berlin Gramajo (son) [phone]-               Functional Status    No documentation.                 Psychosocial    No documentation.                Abuse/Neglect    No documentation.                Legal    No documentation.                Substance Abuse    No documentation.                Patient Forms    No documentation.                   Lolis Islas RN

## 2022-12-27 NOTE — ANESTHESIA PREPROCEDURE EVALUATION
Anesthesia Evaluation     Patient summary reviewed and Nursing notes reviewed   NPO Solid Status: > 8 hours  NPO Liquid Status: > 8 hours           Airway   Mallampati: II  Neck ROM: full  No difficulty expected  Dental - normal exam     Pulmonary     breath sounds clear to auscultation  (+) asthma,  Cardiovascular     Rhythm: regular    (+) hypertension, dysrhythmias Atrial Fib, hyperlipidemia,       Neuro/Psych  GI/Hepatic/Renal/Endo    (+)  GERD, GI bleeding , renal disease, diabetes mellitus,     Musculoskeletal     Abdominal    Substance History      OB/GYN          Other   arthritis,    history of cancer                    Anesthesia Plan    ASA 3     MAC     intravenous induction     Anesthetic plan, risks, benefits, and alternatives have been provided, discussed and informed consent has been obtained with: patient.        CODE STATUS:    Code Status (Patient has no pulse and is not breathing): CPR (Attempt to Resuscitate)  Medical Interventions (Patient has pulse or is breathing): Full Support

## 2022-12-27 NOTE — ANESTHESIA POSTPROCEDURE EVALUATION
"Patient: Antonietta Gramajo    Procedure Summary     Date: 12/27/22 Room / Location: [facility] ENDOSCOPY 6 /  [facility]    Anesthesia Start: 1056 Anesthesia Stop: 1206    Procedure: COLONOSCOPY INTO CECUM AND T.I. WITH APC, COLD SNARE POLYPECTOMY, COLD BIOPSIES AND CLIP PLACEMENT X1 Diagnosis:       Abnormal CT of the abdomen      (Abnormal CT of the abdomen [R93.5])    Surgeons: Scott Dale MD Provider: Azeem Hallman MD    Anesthesia Type: MAC ASA Status: 3          Anesthesia Type: MAC    Vitals  Vitals Value Taken Time   /60 12/27/22 1203   Temp     Pulse 82 12/27/22 1203   Resp 10 12/27/22 1203   SpO2 96 % 12/27/22 1203           Post Anesthesia Care and Evaluation    Pain management: adequate    Airway patency: patent  Anesthetic complications: No anesthetic complications    Cardiovascular status: acceptable  Respiratory status: acceptable  Hydration status: acceptable    Comments: /60 (BP Location: Right arm, Patient Position: Lying)   Pulse 82   Temp 36.7 °C (98 °F) (Oral)   Resp 10   Ht 147.3 cm (58\")   Wt 66.8 kg (147 lb 3.2 oz)   SpO2 96%   BMI 30.76 kg/m²         "

## 2022-12-27 NOTE — PROGRESS NOTES
Name: Antonietta Gramajo ADMIT: 2022   : 1940  PCP: Gemini Catherine MD    MRN: [id number] LOS: 1 days   AGE/SEX: 82 y.o. female  ROOM: [facility]     Subjective   Subjective   Some confusion noted by daughter post endoscopy. No specific medical complaints. Denies chest pain, shortness of breath, nausea. Daughter has noticed LE edema, likely due to IVF's and decreased mobility    Review of Systems   Constitutional: Negative for fever.   HENT: Negative for congestion.    Respiratory: Negative for shortness of breath.    Cardiovascular: Positive for leg swelling. Negative for chest pain.   Gastrointestinal: Negative for nausea.   Genitourinary: Negative for difficulty urinating.   Musculoskeletal: Negative for arthralgias.   Skin: Negative for rash.   Neurological: Negative for headaches.   Psychiatric/Behavioral: Positive for confusion.        Objective   Objective   Vital Signs  Temp:  [98 °F (36.7 °C)-98.3 °F (36.8 °C)] 98.3 °F (36.8 °C)  Heart Rate:  [] 75  Resp:  [10-18] 18  BP: (111-186)/(57-89) 159/78  SpO2:  [94 %-99 %] 98 %  on   ;   Device (Oxygen Therapy): room air  Body mass index is 30.76 kg/m².  Physical Exam  Vitals and nursing note reviewed.   Constitutional:       General: She is not in acute distress.  HENT:      Head: Normocephalic.      Mouth/Throat:      Mouth: Mucous membranes are moist.   Eyes:      Conjunctiva/sclera: Conjunctivae normal.   Cardiovascular:      Rate and Rhythm: Normal rate and regular rhythm.   Pulmonary:      Effort: Pulmonary effort is normal. No respiratory distress.      Breath sounds: Normal breath sounds.   Abdominal:      General: Bowel sounds are normal.      Palpations: Abdomen is soft.      Tenderness: There is abdominal tenderness.      Comments: Mild diffuse tenderness   Musculoskeletal:      Cervical back: Neck supple.      Right lower leg: Edema present.      Left lower leg: Edema present.      Comments: 1+   Skin:     General: Skin is warm and  dry.   Neurological:      General: No focal deficit present.      Mental Status: She is alert.   Psychiatric:         Mood and Affect: Mood normal.         Behavior: Behavior normal.       Results Review     I reviewed the patient's new clinical results.  Results from last 7 days   Lab Units 12/27/22  0755 12/27/22  0136 12/26/22  1659 12/26/22  1406 12/26/22  0758 12/26/22  0556 12/26/22  0205   WBC 10*3/mm3 8.55  --   --  7.53  --  6.65 7.18   HEMOGLOBIN g/dL 10.0* 8.9* 9.4* 8.8*   < > 8.1*  8.1* 9.5*   PLATELETS 10*3/mm3 192  --   --  182  --  191 220    < > = values in this interval not displayed.     Results from last 7 days   Lab Units 12/27/22  0755 12/26/22  0556 12/26/22  0205   SODIUM mmol/L 137 139 135*   POTASSIUM mmol/L 4.2 3.4* 4.2   CHLORIDE mmol/L 109* 105 100   CO2 mmol/L 20.0* 25.9 25.6   BUN mg/dL 15 24* 27*   CREATININE mg/dL 0.69 0.91 1.02*   GLUCOSE mg/dL 185* 201* 519*   EGFR mL/min/1.73 86.8 63.1 55.0*     Results from last 7 days   Lab Units 12/27/22  0755 12/26/22  0205   ALBUMIN g/dL 3.4* 3.30*   BILIRUBIN mg/dL 0.5 0.2   ALK PHOS U/L 98 107   AST (SGOT) U/L 18 13   ALT (SGPT) U/L 13 13     Results from last 7 days   Lab Units 12/27/22  0755 12/26/22  0556 12/26/22  0205   CALCIUM mg/dL 8.3* 8.4* 8.8   ALBUMIN g/dL 3.4*  --  3.30*       Glucose   Date/Time Value Ref Range Status   12/27/2022 0951 253 (H) 70 - 130 mg/dL Final     Comment:     Meter: [id number] : [id number] Rachell Ortiz RN   12/27/2022 0546 66 (L) 70 - 130 mg/dL Final     Comment:     Meter: [id number] : [id number] Silvestre Morales    12/26/2022 2039 195 (H) 70 - 130 mg/dL Final     Comment:     Meter: [id number] : [id number] Silvestre Morales    12/26/2022 1615 101 70 - 130 mg/dL Final     Comment:     Meter: [id number] : [id number] Ferdinand Horn    12/26/2022 1144 96 70 - 130 mg/dL Final     Comment:     Meter: [id number] : [id number] Ferdinand Horn    12/26/2022 0839 114 70 - 130 mg/dL Final      Comment:     Meter: [id number] : [id number] Ferdinand HARRIS       CT Abdomen Pelvis With Contrast    Result Date: 12/26/2022   1. Colonic diverticulosis. 2. There is a somewhat thick-walled appearance to the patient's rectum. This may be related to incomplete distention. Proctitis is not completely excluded. 3. Thick-walled appearance to the urinary bladder. Correlation with urinalysis and cultures is recommended. 4. Small enhancing focus within the right hepatic lobe is indeterminate, although favored to be benign. It is in proximity to both portal and hepatic venous branches, and may reflect a small fistula.  Radiation dose reduction techniques were utilized, including automated exposure control and exposure modulation based on body size.  This report was finalized on 12/26/2022 4:52 AM by Dr. Katerin Gerber M.D.      Scheduled Medications  amLODIPine, 5 mg, Oral, Q24H  gabapentin, 100 mg, Oral, Q12H  insulin glargine, 20 Units, Subcutaneous, Daily  insulin lispro, 0-7 Units, Subcutaneous, TID AC  lidocaine, 1 patch, Transdermal, Q24H  Morphine, 2 mg, Intravenous, Once  ondansetron, 4 mg, Intravenous, Once  pantoprazole, 40 mg, Intravenous, Q AM  sodium chloride, 10 mL, Intravenous, Q12H    Infusions  sodium chloride, 1,000 mL, Last Rate: 1,000 mL (12/27/22 1006)  sodium chloride, 100 mL/hr, Last Rate: 100 mL/hr (12/27/22 0600)    Diet  Diet: Liquid Diets; Clear Liquid; Texture: Regular Texture (IDDSI 7); Fluid Consistency: Thin (IDDSI 0)       Assessment/Plan     Active Hospital Problems    Diagnosis  POA   • **Acute lower GI bleeding [K92.2]  Yes   • Stage 3a chronic kidney disease (HCC) [N18.31]  Yes   • Acute blood loss anemia [D62]  Yes   • Abnormal CT of the abdomen [R93.5]  Yes   • Left hip pain [M25.552]  Yes   • Pain in both lower extremities [M79.604, M79.605]  Yes   • Essential hypertension [I10]  Yes   • Type 2 diabetes mellitus with hyperglycemia, with long-term current use of insulin (HCC)  [E11.65, Z79.4]  Not Applicable      Resolved Hospital Problems   No resolved problems to display.       Ms. Gramajo is an 82 yr old female w/Hx of HLD, DM, asthma, HTN who presents with rectal bleeding, admitted for acute lower GI bleeding    Acute lower GI bleeding/Abnormal CT: + hemoccult stool. S/P c scope today. Follow pathology. Monitor H&H. GI recommending to restart AC in 48-72 hours if no further bleeding. Clear liquid diet tonight    ABLA: S/P 1 unit PRBC 12/26. Monitor Hgb. Transfuse as needed    Lt hip pain/BLE pain: D dimer negative. Hip and L spine xrays nothing acute. Concern for neuropathy, started on low dose gabapentin. OT/PT eval pending. No complaints of pain on exam     HTN: BP acceptable acutely. Continues on amlodipine; ACE-I held, can likely resume tomorrow    DM: BG down to 66 this a.m. likely due to NPO status; up to 253. Correctional scale insulin. Continue basal insulin. Monitor    CKD III: Cr stable. Avoid nephrotoxic meds    BLE edema: Decrease IVF's. BLE doppler neg. Encourage OOB    · SCDs for DVT prophylaxis.  · Full code.  · Discussed with patient, family and nursing staff.  · Anticipate discharge home with home health in 1-2 days.      WALTER Tarango  [facility]  12/27/22  15:27 EST

## 2022-12-28 LAB
ANION GAP SERPL CALCULATED.3IONS-SCNC: 5.5 MMOL/L (ref 5–15)
BUN SERPL-MCNC: 8 MG/DL (ref 8–23)
BUN/CREAT SERPL: 12.5 (ref 7–25)
CALCIUM SPEC-SCNC: 7.9 MG/DL (ref 8.6–10.5)
CHLORIDE SERPL-SCNC: 112 MMOL/L (ref 98–107)
CO2 SERPL-SCNC: 24.5 MMOL/L (ref 22–29)
CREAT SERPL-MCNC: 0.64 MG/DL (ref 0.57–1)
DEPRECATED RDW RBC AUTO: 39.6 FL (ref 37–54)
DEPRECATED RDW RBC AUTO: 41.2 FL (ref 37–54)
EGFRCR SERPLBLD CKD-EPI 2021: 88.4 ML/MIN/1.73
ERYTHROCYTE [DISTWIDTH] IN BLOOD BY AUTOMATED COUNT: 12.5 % (ref 12.3–15.4)
ERYTHROCYTE [DISTWIDTH] IN BLOOD BY AUTOMATED COUNT: 12.8 % (ref 12.3–15.4)
GLUCOSE BLDC GLUCOMTR-MCNC: 101 MG/DL (ref 70–130)
GLUCOSE BLDC GLUCOMTR-MCNC: 149 MG/DL (ref 70–130)
GLUCOSE BLDC GLUCOMTR-MCNC: 300 MG/DL (ref 70–130)
GLUCOSE BLDC GLUCOMTR-MCNC: 362 MG/DL (ref 70–130)
GLUCOSE SERPL-MCNC: 83 MG/DL (ref 65–99)
HCT VFR BLD AUTO: 23.9 % (ref 34–46.6)
HCT VFR BLD AUTO: 24.8 % (ref 34–46.6)
HCT VFR BLD AUTO: 26.9 % (ref 34–46.6)
HCT VFR BLD AUTO: 27.1 % (ref 34–46.6)
HGB BLD-MCNC: 8.2 G/DL (ref 12–15.9)
HGB BLD-MCNC: 8.3 G/DL (ref 12–15.9)
HGB BLD-MCNC: 8.8 G/DL (ref 12–15.9)
HGB BLD-MCNC: 8.9 G/DL (ref 12–15.9)
MCH RBC QN AUTO: 29 PG (ref 26.6–33)
MCH RBC QN AUTO: 29.8 PG (ref 26.6–33)
MCHC RBC AUTO-ENTMCNC: 32.5 G/DL (ref 31.5–35.7)
MCHC RBC AUTO-ENTMCNC: 33.1 G/DL (ref 31.5–35.7)
MCV RBC AUTO: 89.4 FL (ref 79–97)
MCV RBC AUTO: 90.2 FL (ref 79–97)
PLATELET # BLD AUTO: 165 10*3/MM3 (ref 140–450)
PLATELET # BLD AUTO: 215 10*3/MM3 (ref 140–450)
PMV BLD AUTO: 10.1 FL (ref 6–12)
PMV BLD AUTO: 9.9 FL (ref 6–12)
POTASSIUM SERPL-SCNC: 3.5 MMOL/L (ref 3.5–5.2)
RBC # BLD AUTO: 2.75 10*6/MM3 (ref 3.77–5.28)
RBC # BLD AUTO: 3.03 10*6/MM3 (ref 3.77–5.28)
SODIUM SERPL-SCNC: 142 MMOL/L (ref 136–145)
WBC NRBC COR # BLD: 6.15 10*3/MM3 (ref 3.4–10.8)
WBC NRBC COR # BLD: 6.9 10*3/MM3 (ref 3.4–10.8)

## 2022-12-28 PROCEDURE — 97116 GAIT TRAINING THERAPY: CPT

## 2022-12-28 PROCEDURE — 85018 HEMOGLOBIN: CPT | Performed by: INTERNAL MEDICINE

## 2022-12-28 PROCEDURE — 85027 COMPLETE CBC AUTOMATED: CPT | Performed by: INTERNAL MEDICINE

## 2022-12-28 PROCEDURE — 99231 SBSQ HOSP IP/OBS SF/LOW 25: CPT | Performed by: INTERNAL MEDICINE

## 2022-12-28 PROCEDURE — 97530 THERAPEUTIC ACTIVITIES: CPT

## 2022-12-28 PROCEDURE — 36415 COLL VENOUS BLD VENIPUNCTURE: CPT | Performed by: INTERNAL MEDICINE

## 2022-12-28 PROCEDURE — 97166 OT EVAL MOD COMPLEX 45 MIN: CPT

## 2022-12-28 PROCEDURE — 97161 PT EVAL LOW COMPLEX 20 MIN: CPT

## 2022-12-28 PROCEDURE — 80048 BASIC METABOLIC PNL TOTAL CA: CPT | Performed by: NURSE PRACTITIONER

## 2022-12-28 PROCEDURE — 82962 GLUCOSE BLOOD TEST: CPT

## 2022-12-28 PROCEDURE — 63710000001 INSULIN LISPRO (HUMAN) PER 5 UNITS: Performed by: INTERNAL MEDICINE

## 2022-12-28 PROCEDURE — 85014 HEMATOCRIT: CPT | Performed by: INTERNAL MEDICINE

## 2022-12-28 RX ADMIN — INSULIN LISPRO 5 UNITS: 100 INJECTION, SOLUTION INTRAVENOUS; SUBCUTANEOUS at 17:13

## 2022-12-28 RX ADMIN — Medication 10 ML: at 20:09

## 2022-12-28 RX ADMIN — AMLODIPINE BESYLATE 5 MG: 5 TABLET ORAL at 08:45

## 2022-12-28 RX ADMIN — GABAPENTIN 100 MG: 100 CAPSULE ORAL at 20:08

## 2022-12-28 RX ADMIN — PANTOPRAZOLE SODIUM 40 MG: 40 INJECTION, POWDER, FOR SOLUTION INTRAVENOUS at 06:05

## 2022-12-28 RX ADMIN — LIDOCAINE 1 PATCH: 50 PATCH TOPICAL at 08:46

## 2022-12-28 RX ADMIN — Medication 10 ML: at 08:48

## 2022-12-28 RX ADMIN — GABAPENTIN 100 MG: 100 CAPSULE ORAL at 08:45

## 2022-12-28 NOTE — PLAN OF CARE
Goal Outcome Evaluation:  Plan of Care Reviewed With: patient, daughter           Outcome Evaluation: Pt is a 82 y.o female admitted with GI bleed s/p colonoscopy yesterday. At baseline she lives alone and is independent with ADLs. She primarly uses a walking stick but does have a rollator. Today pt able to complete multiple bADLs and transfers SBA. Initally she attempted to use her walking stick but felt unsteady and does better transfering to bathroom using rwx. Pt denies any weakness/fartique with activity. She does take extra time to don her socks which is her basdline due to \"bad knees\" but is able to complete with some effort. OT did discuss AE for increased ease/independence with dressing which pt denies at this time. She does use tongs all throughout the house as a reacher. Plans to dc home with daughter coming for a few days to assist as needed. Pt appears safe for dc home with family or continued ADLs with family/nsg SBA-SV. No further OT needs at this time.    OT wore appropriate PPE and completed hand hygiene.

## 2022-12-28 NOTE — THERAPY EVALUATION
Patient Name: Antonietta Gramajo  : 1940    MRN: [id number]                              Today's Date: 2022       Admit Date: 2022    Visit Dx:     ICD-10-CM ICD-9-CM   1. Acute lower GI bleeding  K92.2 578.9   2. Diverticulosis  K57.90 562.10   3. Hyperglycemia  R73.9 790.29   4. Abnormal CT of the abdomen  R93.5 793.6     Patient Active Problem List   Diagnosis   • Allergic rhinitis   • GERD without esophagitis   • Essential hypertension   • Osteoarthritis of knee   • Type 2 diabetes mellitus with hyperglycemia, with long-term current use of insulin ([facility])   • Open-angle glaucoma   • Nonproliferative diabetic retinopathy of both eyes ([facility])   • Hyperlipidemia   • Long-term insulin use ([facility])   • Mild intermittent asthma without complication   • Osteoarthritis of multiple joints   • Vitamin D deficiency   • Diabetic autonomic neuropathy associated with type 2 diabetes mellitus ([facility])   • Neutropenia ([facility])   • Paroxysmal atrial fibrillation ([facility])   • Pancytopenia ([facility])   • Dysfunctional grieving   • Acute lower GI bleeding   • Stage 3a chronic kidney disease ([facility])   • Acute blood loss anemia   • Abnormal CT of the abdomen   • Left hip pain   • Pain in both lower extremities     Past Medical History:   Diagnosis Date   • Cataract    • Chest pain    • Diverticulitis    • Essential hypertension    • Fibromyalgia    • Hyperlipidemia    • Mild intermittent asthma without complication    • Osteoarthritis of multiple joints    • Skin cancer    • Type 2 diabetes mellitus ([facility])      Past Surgical History:   Procedure Laterality Date   • ANKLE SURGERY Right     metal   • CATARACT EXTRACTION     • CHOLECYSTECTOMY     • CLEFT PALATE REPAIR     • FOOT SURGERY Left     metal   • HYSTERECTOMY     • TONSILLECTOMY        General Information     Row Name 22 8259          Physical Therapy Time and Intention    Document Type discharge evaluation/summary  -CS     Mode of Treatment individual therapy;physical therapy   -CS     Row Name 12/28/22 1335          General Information    Patient Profile Reviewed yes  -CS     Prior Level of Function independent:;community mobility;gait;transfer;bed mobility  uses SC, walking stick, and rollator for mobility  -CS     Existing Precautions/Restrictions no known precautions/restrictions  -CS     Barriers to Rehab none identified  -CS     Row Name 12/28/22 1335          Living Environment    People in Home alone  daughter plans to stay to assist at D/C  -CS     Row Name 12/28/22 1335          Home Main Entrance    Number of Stairs, Main Entrance three  -CS     Stair Railings, Main Entrance railings safe and in good condition  -CS     Row Name 12/28/22 1335          Stairs Within Home, Primary    Number of Stairs, Within Home, Primary twelve  -CS     Stair Railings, Within Home, Primary railings safe and in good condition  -CS     Row Name 12/28/22 1336          Cognition    Orientation Status (Cognition) oriented x 4  -CS     Row Name 12/28/22 1331          Safety Issues, Functional Mobility    Impairments Affecting Function (Mobility) balance;endurance/activity tolerance  -CS           User Key  (r) = Recorded By, (t) = Taken By, (c) = Cosigned By    Initials Name Provider Type    CS Kerry Doherty, PT Physical Therapist               Mobility     Row Name 12/28/22 1336          Bed Mobility    Bed Mobility supine-sit  -CS     Supine-Sit [place] (Bed Mobility) supervision  -CS     Assistive Device (Bed Mobility) head of bed elevated  -CS     Comment, (Bed Mobility) UIC at end of session  -CS     Row Name 12/28/22 1336          Sit-Stand Transfer    Sit-Stand [place] (Transfers) supervision  -CS     Assistive Device (Sit-Stand Transfers) other (see comments)  walking stick  -CS     Comment, (Sit-Stand Transfer) x 2 (from bed & toilet)  -CS     Row Name 12/28/22 1332          Gait/Stairs (Locomotion)    [place] Level (Gait) standby assist  -CS     Assistive Device (Gait) other  (see comments)  walking stick  -CS     Distance in Feet (Gait) 200'  -CS     Deviations/Abnormal Patterns (Gait) [place] decreased  -CS     [place] Level (Stairs) contact guard  -CS     Assistive Device (Stairs) other (see comments)  HR  -CS     Handrail Location (Stairs) right side (ascending);left side (descending)  -CS     Number of Steps (Stairs) 8  -CS     Ascending Technique (Stairs) step-to-step  -CS     Descending Technique (Stairs) step-to-step  -CS     Comment, (Gait/Stairs) slow pace but no unsteadiness  -CS           User Key  (r) = Recorded By, (t) = Taken By, (c) = Cosigned By    Initials Name Provider Type    CS Kerry Doherty, PT Physical Therapist               Obj/Interventions     Row Name 12/28/22 1337          Range of Motion Comprehensive    General Range of Motion bilateral lower extremity ROM WFL  -CS     Row Name 12/28/22 1337          Strength Comprehensive (MMT)    General Manual Muscle Testing (MMT) Assessment no strength deficits identified  -CS     Comment, General Manual Muscle Testing (MMT) Assessment B LE WFL  -CS     Row Name 12/28/22 1337          Balance    Balance Assessment sitting static balance;sitting dynamic balance;standing static balance  -CS     Static Sitting Balance supervision  -CS     Dynamic Sitting Balance supervision  -CS     Position, Sitting Balance unsupported;sitting edge of bed  -CS     Static Standing Balance standby assist  -CS     Dynamic Standing Balance standby assist  -CS     Position/Device Used, Standing Balance supported;other (see comments)  walking stick  -CS           User Key  (r) = Recorded By, (t) = Taken By, (c) = Cosigned By    Initials Name Provider Type    CS Kerry Doherty, PT Physical Therapist               Goals/Plan    No documentation.                Clinical Impression     Row Name 12/28/22 1331          Pain    Pretreatment Pain Rating 0/10 - no pain  -CS     Row Name 12/28/22 1338          Plan of Care Review    Plan of Care  Reviewed With patient;daughter  -CS     Outcome Evaluation Pt is a 81 y/o F admitted to [facility] with a GI bleed s/p colonoscopy yesterday. Pt received in bed upon arrival and agreeable to PT eval. Pt reports she lives alone with 3 JUAN and was (I) with mobility with mulitple AD's including a walking stick, SC, and rollator. Daughter plans to stay to assist at D/C. Pt reached sitting EOB with SPV and denied any reports of dizziness. Pt stood x 2 from both bed & toilet with SPV. Pt ambulated 200' c walking stick requiring SBA and completed 8 steps c HR requiring CGA. Pt presents with slow pace but no unsteadiness. PT recommends home with assist at D/C. At this time there are no concerns for safety or mobility as pt is at functional baseline. PT cleared pt to ambulate with family or staff throughout the day. PT will sign off.  -CS     Row Name 12/28/22 5425          Therapy Assessment/Plan (PT)    Patient/Family Therapy Goals Statement (PT) to return home  -CS     Criteria for Skilled Interventions Met (PT) no problems identified which require skilled intervention;yes  -CS     Therapy Frequency (PT) evaluation only  -CS     Row Name 12/28/22 2003          Positioning and Restraints    Pre-Treatment Position in bed  -CS     Post Treatment Position chair  -CS     In Chair notified nsg;sitting;call light within reach;encouraged to call for assist;with family/caregiver  -CS           User Key  (r) = Recorded By, (t) = Taken By, (c) = Cosigned By    Initials Name Provider Type    CS Kerry Doherty, PT Physical Therapist               Outcome Measures     Row Name 12/28/22 2003          How much help from another person do you currently need...    Turning from your back to your side while in flat bed without using bedrails? 4  -CS     Moving from lying on back to sitting on the side of a flat bed without bedrails? 4  -CS     Moving to and from a bed to a chair (including a wheelchair)? 4  -CS     Standing up from a chair  using your arms (e.g., wheelchair, bedside chair)? 4  -CS     Climbing 3-5 steps with a railing? 3  -CS     To walk in hospital room? 4  -CS     AM-PAC 6 Clicks Score (PT) 23  -CS     Highest level of mobility 7 --> Walked 25 feet or more  -     Row Name 12/28/22 1342 12/28/22 1200       Functional Assessment    Outcome Measure Options AM-PAC 6 Clicks Basic Mobility (PT)  -CS AM-PAC 6 Clicks Daily Activity (OT)  -          User Key  (r) = Recorded By, (t) = Taken By, (c) = Cosigned By    Initials Name Provider Type     Carmen Latif, OT Occupational Therapist    Kerry Ramos, PT Physical Therapist                             Physical Therapy Education     Title: PT OT SLP Therapies (In Progress)     Topic: Physical Therapy (Done)     Point: Mobility training (Done)     Learning Progress Summary           Patient Acceptance, E,TB, VU,DU by  at 12/28/2022 1342   Family Acceptance, E,TB, VU,DU by  at 12/28/2022 1342                   Point: Home exercise program (Done)     Learning Progress Summary           Patient Acceptance, E,TB, VU,DU by  at 12/28/2022 1342   Family Acceptance, E,TB, VU,DU by  at 12/28/2022 1342                   Point: Body mechanics (Done)     Learning Progress Summary           Patient Acceptance, E,TB, VU,DU by  at 12/28/2022 1342   Family Acceptance, E,TB, VU,DU by  at 12/28/2022 1342                   Point: Precautions (Done)     Learning Progress Summary           Patient Acceptance, E,TB, VU,DU by  at 12/28/2022 1342   Family Acceptance, E,TB, VU,DU by  at 12/28/2022 1342                               User Key     Initials Effective Dates Name Provider Type Discipline     09/22/22 -  Kerry Doherty, PT Physical Therapist PT              PT Recommendation and Plan     Plan of Care Reviewed With: patient, daughter  Outcome Evaluation: Pt is a 81 y/o F admitted to [facility] with a GI bleed s/p colonoscopy yesterday. Pt received in bed upon arrival and  agreeable to PT eval. Pt reports she lives alone with 3 JUAN and was (I) with mobility with mulitple AD's including a walking stick, SC, and rollator. Daughter plans to stay to assist at D/C. Pt reached sitting EOB with SPV and denied any reports of dizziness. Pt stood x 2 from both bed & toilet with SPV. Pt ambulated 200' c walking stick requiring SBA and completed 8 steps c HR requiring CGA. Pt presents with slow pace but no unsteadiness. PT recommends home with assist at D/C. At this time there are no concerns for safety or mobility as pt is at functional baseline. PT cleared pt to ambulate with family or staff throughout the day. PT will sign off.     Time Calculation:    PT Charges     Row Name 12/28/22 1342             Time Calculation    Start Time 1300  -CS      Stop Time 1330  -CS      Time Calculation (min) 30 min  -CS      PT Received On 12/28/22  -CS         Time Calculation- PT    Total Timed Code Minutes- PT 26 minute(s)  -CS         Timed Charges    58753 - Gait Training Minutes  16  -CS      81606 - PT Therapeutic Activity Minutes 10  -CS         Total Minutes    Timed Charges Total Minutes 26  -CS       Total Minutes 26  -CS            User Key  (r) = Recorded By, (t) = Taken By, (c) = Cosigned By    Initials Name Provider Type    CS Kerry Doherty, PT Physical Therapist              Therapy Charges for Today     Code Description Service Date Service Provider Modifiers Qty    90393501147 HC GAIT TRAINING EA 15 MIN 12/28/2022 Kerry Doherty, PT GP 1    [id number]  PT THERAPEUTIC ACT EA 15 MIN 12/28/2022 Kerry Doherty, PT GP 1    [id number]  PT EVAL LOW COMPLEXITY 3 12/28/2022 Kerry Doherty, PT GP 1          PT G-Codes  Outcome Measure Options: AM-PAC 6 Clicks Basic Mobility (PT)  AM-PAC 6 Clicks Score (PT): 23  AM-PAC 6 Clicks Score (OT): 21  PT Discharge Summary  Anticipated Discharge Disposition (PT): home with assist    Kerry Doherty PT  12/28/2022

## 2022-12-28 NOTE — THERAPY EVALUATION
Patient Name: Antonietta Gramajo  : 1940    MRN: [id number]                              Today's Date: 2022       Admit Date: 2022    Visit Dx:     ICD-10-CM ICD-9-CM   1. Acute lower GI bleeding  K92.2 578.9   2. Diverticulosis  K57.90 562.10   3. Hyperglycemia  R73.9 790.29   4. Abnormal CT of the abdomen  R93.5 793.6     Patient Active Problem List   Diagnosis   • Allergic rhinitis   • GERD without esophagitis   • Essential hypertension   • Osteoarthritis of knee   • Type 2 diabetes mellitus with hyperglycemia, with long-term current use of insulin ([facility])   • Open-angle glaucoma   • Nonproliferative diabetic retinopathy of both eyes ([facility])   • Hyperlipidemia   • Long-term insulin use ([facility])   • Mild intermittent asthma without complication   • Osteoarthritis of multiple joints   • Vitamin D deficiency   • Diabetic autonomic neuropathy associated with type 2 diabetes mellitus ([facility])   • Neutropenia ([facility])   • Paroxysmal atrial fibrillation ([facility])   • Pancytopenia ([facility])   • Dysfunctional grieving   • Acute lower GI bleeding   • Stage 3a chronic kidney disease ([facility])   • Acute blood loss anemia   • Abnormal CT of the abdomen   • Left hip pain   • Pain in both lower extremities     Past Medical History:   Diagnosis Date   • Cataract    • Chest pain    • Diverticulitis    • Essential hypertension    • Fibromyalgia    • Hyperlipidemia    • Mild intermittent asthma without complication    • Osteoarthritis of multiple joints    • Skin cancer    • Type 2 diabetes mellitus ([facility])      Past Surgical History:   Procedure Laterality Date   • ANKLE SURGERY Right     metal   • CATARACT EXTRACTION     • CHOLECYSTECTOMY     • CLEFT PALATE REPAIR     • FOOT SURGERY Left     metal   • HYSTERECTOMY     • TONSILLECTOMY        General Information     Row Name 22 0829          OT Time and Intention    Document Type discharge evaluation/summary  -SM     Mode of Treatment occupational therapy;individual therapy  -      Row Name 12/28/22 0829          General Information    Patient Profile Reviewed yes  -     Prior Level of Function independent:;ADL's;all household mobility;cooking;community mobility  -     Existing Precautions/Restrictions no known precautions/restrictions  -     Row Name 12/28/22 0829          Occupational Profile    Environmental Supports and Barriers (Occupational Profile) pt uses a rollator for longer distances, has a walking stick, shower chair  -     Row Name 12/28/22 0829          Living Environment    People in Home alone  -     Row Name 12/28/22 0829          Cognition    Orientation Status (Cognition) oriented x 4  -     Row Name 12/28/22 0829          Safety Issues, Functional Mobility    Impairments Affecting Function (Mobility) balance;endurance/activity tolerance  -           User Key  (r) = Recorded By, (t) = Taken By, (c) = Cosigned By    Initials Name Provider Type     Carmen Latif OT Occupational Therapist                 Mobility/ADL's     Row Name 12/28/22 0830          Bed Mobility    Bed Mobility supine-sit  -     Supine-Sit [place] (Bed Mobility) standby assist  -     Row Name 12/28/22 0830          Transfers    Transfers sit-stand transfer;toilet transfer  -     Row Name 12/28/22 0830          Sit-Stand Transfer    Sit-Stand [place] (Transfers) standby assist  [facility]     Assistive Device (Sit-Stand Transfers) walker, front-wheeled  -     Row Name 12/28/22 0830          Toilet Transfer    [place] Level (Toilet Transfer) stand assist  [facility]     Assistive Device (Toilet Transfer) walker, front-wheeled  -     Row Name 12/28/22 0830          Functional Mobility    Functional Mobility- Ind. Level standby assist  [facility]     Functional Mobility- Device walker, front-wheeled;cane, straight  -     Row Name 12/28/22 0830          Activities of Daily Living    BADL Assessment/Intervention toileting;lower body dressing;grooming;feeding  -     Row Name  12/28/22 0830          Toileting Assessment/Training    [place] Level (Toileting) toileting skills;standby assist  -     Row Name 12/28/22 0830          Lower Body Dressing Assessment/Training    [place] Level (Lower Body Dressing) lower body dressing skills;standby assist;socks  -     Position (Lower Body Dressing) supported sitting  -     Row Name 12/28/22 0830          Grooming Assessment/Training    [place] Level (Grooming) grooming skills;standby assist;wash face, hands  -     Position (Grooming) sink side;supported standing  -     Row Name 12/28/22 0830          Self-Feeding Assessment/Training    [place] Level (Feeding) feeding skills;independent  -     Position (Self-Feeding) supported sitting  -           User Key  (r) = Recorded By, (t) = Taken By, (c) = Cosigned By    Initials Name Provider Type    Carmen Zelaya OT Occupational Therapist               Obj/Interventions     Row Name 12/28/22 1134          Range of Motion Comprehensive    General Range of Motion bilateral upper extremity ROM WFL  -[facility] Name 12/28/22 1134          Strength Comprehensive (MMT)    Comment, General Manual Muscle Testing (MMT) Assessment BUE good functional strength for ADL tasks  -     Row Name 12/28/22 1134          Motor Skills    Motor Skills functional endurance  -     Functional Endurance WFL for ADLs  -[facility] Name 12/28/22 1134          Balance    Balance Assessment sitting static balance;standing static balance;standing dynamic balance  -     Static Sitting Balance independent  -SM     Position, Sitting Balance unsupported  -     Static Standing Balance standby assist  -     Dynamic Standing Balance standby assist  -     Position/Device Used, Standing Balance supported;walker, rolling;cane, straight  -           User Key  (r) = Recorded By, (t) = Taken By, (c) = Cosigned By    Initials Name Provider Type    Carmen Zelaya OT Occupational Therapist                Goals/Plan     Row Name 12/28/22 1159          Problem Specific Goal 1 (OT)    Problem Specific Goal 1 (OT) Pt to complete ADLs and transfers no more than SBA in prep to dc home safely.  -     Time Frame (Problem Specific Goal 1, OT) short term goal (STG);by discharge  -     Progress/Outcome (Problem Specific Goal 1, OT) goal met  -           User Key  (r) = Recorded By, (t) = Taken By, (c) = Cosigned By    Initials Name Provider Type    Carmen Zelaya, OT Occupational Therapist               Clinical Impression     Row Name 12/28/22 1134          Pain Assessment    Pretreatment Pain Rating 0/10 - no pain  -     Posttreatment Pain Rating 0/10 - no pain  -     Row Name 12/28/22 1134          Plan of Care Review    Plan of Care Reviewed With patient;daughter  -     Outcome Evaluation Pt is a 82 y.o female admitted with GI bleed s/p colonoscopy yesterday. At baseline she lives alone and is independent with ADLs. She primarly uses a walking stick but does have a rollator. Today pt able to complete multiple bADLs and transfers SBA. Initally she attempted to use her walking stick but felt unsteady and does better transfering to bathroom using rwx. Pt denies any weakness/fartique with activity. She does take extra time to don her socks which is her basdline due to \"bad knees\" but is able to complete with some effort. OT did discuss AE for increased ease/independence with dressing which pt denies at this time. She does use tongs all throughout the house as a reacher. Plans to dc home with daughter coming for a few days to assist as needed. Pt appears safe for dc home with family or continued ADLs with family/nsg SBA-SV. No further OT needs at this time.  -     Row Name 12/28/22 1134          Therapy Assessment/Plan (OT)    Therapy Frequency (OT) evaluation only  -     Row Name 12/28/22 113          Therapy Plan Review/Discharge Plan (OT)    Anticipated Discharge Disposition (OT) home  with assist  -     Row Name 12/28/22 1134          Positioning and Restraints    Pre-Treatment Position in bed  -     Post Treatment Position chair  -     In Chair sitting;call light within reach;encouraged to call for assist;notified nsg;with family/caregiver  -           User Key  (r) = Recorded By, (t) = Taken By, (c) = Cosigned By    Initials Name Provider Type    Carmen Zelaya OT Occupational Therapist               Outcome Measures     Row Name 12/28/22 1200          How much help from another is currently needed...    Putting on and taking off regular lower body clothing? 3  -SM     Bathing (including washing, rinsing, and drying) 3  -SM     Toileting (which includes using toilet bed pan or urinal) 3  -SM     Putting on and taking off regular upper body clothing 4  -SM     Taking care of personal grooming (such as brushing teeth) 4  -SM     Eating meals 4  -SM     AM-PAC 6 Clicks Score (OT) 21  -     Row Name 12/28/22 1200          Functional Assessment    Outcome Measure Options AM-PAC 6 Clicks Daily Activity (OT)  -           User Key  (r) = Recorded By, (t) = Taken By, (c) = Cosigned By    Initials Name Provider Type    Carmen Zelaya OT Occupational Therapist                Occupational Therapy Education     Title: PT OT SLP Therapies (In Progress)     Topic: Occupational Therapy (In Progress)     Point: ADL training (Done)     Description:   Instruct learner(s) on proper safety adaptation and remediation techniques during self care or transfers.   Instruct in proper use of assistive devices.              Learning Progress Summary           Patient Acceptance, E, VU by  at 12/28/2022 1201    Comment: safety with ADLs at dc, AE available in the future if pt has decline in LBD as she reports she already has difficulty at baseline.   Family Acceptance, E, VU by  at 12/28/2022 1201    Comment: safety with ADLs at dc, AE available in the future if pt has decline in LBD as she  reports she already has difficulty at baseline.                   Point: Home exercise program (Not Started)     Description:   Instruct learner(s) on appropriate technique for monitoring, assisting and/or progressing therapeutic exercises/activities.              Learner Progress:  Not documented in this visit.          Point: Precautions (Not Started)     Description:   Instruct learner(s) on prescribed precautions during self-care and functional transfers.              Learner Progress:  Not documented in this visit.          Point: Body mechanics (Not Started)     Description:   Instruct learner(s) on proper positioning and spine alignment during self-care, functional mobility activities and/or exercises.              Learner Progress:  Not documented in this visit.                      User Key     Initials Effective Dates Name Provider Type Discipline     04/02/20 -  Carmen Latif OT Occupational Therapist OT              OT Recommendation and Plan  Therapy Frequency (OT): evaluation only  Plan of Care Review  Plan of Care Reviewed With: patient, daughter  Outcome Evaluation: Pt is a 82 y.o female admitted with GI bleed s/p colonoscopy yesterday. At baseline she lives alone and is independent with ADLs. She primarly uses a walking stick but does have a rollator. Today pt able to complete multiple bADLs and transfers SBA. Initally she attempted to use her walking stick but felt unsteady and does better transfering to bathroom using rwx. Pt denies any weakness/fartique with activity. She does take extra time to don her socks which is her basdline due to \"bad knees\" but is able to complete with some effort. OT did discuss AE for increased ease/independence with dressing which pt denies at this time. She does use tongs all throughout the house as a reacher. Plans to dc home with daughter coming for a few days to assist as needed. Pt appears safe for dc home with family or continued ADLs with family/nsg  SBA-SV. No further OT needs at this time.     Time Calculation:    Time Calculation- OT     Row Name 12/28/22 1203             Time Calculation- OT    OT Start Time 0820  -SM      OT Stop Time 0846  -SM      OT Time Calculation (min) 26 min  -SM      Total Timed Code Minutes- OT 16 minute(s)  -SM      OT Received On 12/28/22  -         Timed Charges    80815 - OT Therapeutic Activity Minutes 16  -SM         Untimed Charges    OT Eval/Re-eval Minutes 10  -SM         Total Minutes    Timed Charges Total Minutes 16  -SM      Untimed Charges Total Minutes 10  -SM       Total Minutes 26  -SM            User Key  (r) = Recorded By, (t) = Taken By, (c) = Cosigned By    Initials Name Provider Type     Carmen Latif OT Occupational Therapist              Therapy Charges for Today     Code Description Service Date Service Provider Modifiers Qty    34843663283 HC OT THERAPEUTIC ACT EA 15 MIN 12/28/2022 Carmen Latif OT GO 1    89069145055 HC OT EVAL MOD COMPLEXITY 2 12/28/2022 Carmen Latif OT GO 1               Carmen Latif OT  12/28/2022

## 2022-12-28 NOTE — PLAN OF CARE
Goal Outcome Evaluation:      A&Ox4, R/A, VSS, clear liquid, BM post colonoscopy appears normal, hgb stable, NS @ 50mL/hr, daughter @ bedside, plans to discharge today or tomorrow, will continue to monitor

## 2022-12-28 NOTE — PLAN OF CARE
Problem: Skin Injury Risk Increased  Goal: Skin Health and Integrity  Outcome: Ongoing, Progressing     Problem: Fall Injury Risk  Goal: Absence of Fall and Fall-Related Injury  Outcome: Ongoing, Progressing   Goal Outcome Evaluation:  Plan of Care Reviewed With: patient, daughter        Progress: no change  Outcome Evaluation: VSS. No c/o pain. Up with minimal assist. Discharge home tomorrow. Will monitor.

## 2022-12-28 NOTE — PROGRESS NOTES
Name: Antonietta Gramajo ADMIT: 2022   : 1940  PCP: Gemini Catherine MD    MRN: [id number] LOS: 2 days   AGE/SEX: 82 y.o. female  ROOM: [facility]     Subjective   Subjective   Up in chair when seen. Feels well. Still w/lower ext edema, about the same. Denies shortness of breath, chest pain.     Review of Systems   Constitutional: Negative for chills and fever.   HENT: Negative for congestion.    Respiratory: Negative for shortness of breath.    Cardiovascular: Positive for leg swelling. Negative for chest pain.   Gastrointestinal: Negative for abdominal pain and nausea.   Genitourinary: Negative for difficulty urinating.   Musculoskeletal: Negative for arthralgias.   Skin: Negative for rash.   Neurological: Negative for headaches.   Psychiatric/Behavioral: Negative for sleep disturbance.        Objective   Objective   Vital Signs  Temp:  [97.2 °F (36.2 °C)-98.4 °F (36.9 °C)] 98 °F (36.7 °C)  Heart Rate:  [71-84] 71  Resp:  [12-18] 16  BP: (126-159)/(54-78) 138/64  SpO2:  [96 %-99 %] 96 %  on   ;   Device (Oxygen Therapy): room air  Body mass index is 30.76 kg/m².  Physical Exam  Vitals and nursing note reviewed.   Constitutional:       General: She is not in acute distress.  HENT:      Head: Normocephalic.      Mouth/Throat:      Mouth: Mucous membranes are moist.   Eyes:      Conjunctiva/sclera: Conjunctivae normal.   Cardiovascular:      Rate and Rhythm: Normal rate and regular rhythm.   Pulmonary:      Effort: Pulmonary effort is normal. No respiratory distress.      Breath sounds: No wheezing or rales.   Abdominal:      General: Bowel sounds are normal.      Palpations: Abdomen is soft.   Musculoskeletal:      Cervical back: Neck supple.      Right lower leg: Edema present.      Left lower leg: Edema present.      Comments: 1+   Skin:     General: Skin is warm and dry.   Neurological:      Mental Status: She is alert. Mental status is at baseline.   Psychiatric:         Mood and Affect: Mood normal.          Behavior: Behavior normal.       Results Review     I reviewed the patient's new clinical results.  Results from last 7 days   Lab Units 12/28/22  0709 12/27/22  2345 12/27/22  1902 12/27/22  1528 12/27/22  0755 12/26/22  1659 12/26/22  1406   WBC 10*3/mm3 6.15  --  9.78  --  8.55  --  7.53   HEMOGLOBIN g/dL 8.2* 8.3* 8.5* 8.5* 10.0*   < > 8.8*   PLATELETS 10*3/mm3 165  --  195  --  192  --  182    < > = values in this interval not displayed.     Results from last 7 days   Lab Units 12/28/22  0709 12/27/22  0755 12/26/22  0556 12/26/22  0205   SODIUM mmol/L 142 137 139 135*   POTASSIUM mmol/L 3.5 4.2 3.4* 4.2   CHLORIDE mmol/L 112* 109* 105 100   CO2 mmol/L 24.5 20.0* 25.9 25.6   BUN mg/dL 8 15 24* 27*   CREATININE mg/dL 0.64 0.69 0.91 1.02*   GLUCOSE mg/dL 83 185* 201* 519*   EGFR mL/min/1.73 88.4 86.8 63.1 55.0*     Results from last 7 days   Lab Units 12/27/22  0755 12/26/22  0205   ALBUMIN g/dL 3.4* 3.30*   BILIRUBIN mg/dL 0.5 0.2   ALK PHOS U/L 98 107   AST (SGOT) U/L 18 13   ALT (SGPT) U/L 13 13     Results from last 7 days   Lab Units 12/28/22  0709 12/27/22  0755 12/26/22  0556 12/26/22  0205   CALCIUM mg/dL 7.9* 8.3* 8.4* 8.8   ALBUMIN g/dL  --  3.4*  --  3.30*       Glucose   Date/Time Value Ref Range Status   12/28/2022 1123 149 (H) 70 - 130 mg/dL Final     Comment:     Meter: [id number] : [id number] Dagoberto Salima NA   12/28/2022 0606 101 70 - 130 mg/dL Final     Comment:     Meter: [id number] : [id number] Cong Whittington NA   12/27/2022 2219 156 (H) 70 - 130 mg/dL Final     Comment:     Meter: [id number] : [id number] Yolanda Roche NA   12/27/2022 1947 211 (H) 70 - 130 mg/dL Final     Comment:     Meter: [id number] : [id number] Yolanda Roche NA   12/27/2022 1922 53 (L) 70 - 130 mg/dL Final     Comment:     Meter: [id number] : [id number] Yolanda Roche NA   12/27/2022 1622 152 (H) 70 - 130 mg/dL Final     Comment:     Meter: [id number] : [id number] Brijesh  Martha    12/27/2022 0951 253 (H) 70 - 130 mg/dL Final     Comment:     Meter: [id number] : [id number] Rachell Ortiz RN       No radiology results for the last day  Scheduled Medications  amLODIPine, 5 mg, Oral, Q24H  gabapentin, 100 mg, Oral, Q12H  insulin glargine, 20 Units, Subcutaneous, Daily  insulin lispro, 0-7 Units, Subcutaneous, TID AC  lidocaine, 1 patch, Transdermal, Q24H  Morphine, 2 mg, Intravenous, Once  ondansetron, 4 mg, Intravenous, Once  pantoprazole, 40 mg, Intravenous, Q AM  sodium chloride, 10 mL, Intravenous, Q12H    Infusions  sodium chloride, 1,000 mL, Last Rate: 1,000 mL (12/27/22 1006)    Diet  Diet: Liquid Diets; Clear Liquid; Texture: Regular Texture (IDDSI 7); Fluid Consistency: Thin (IDDSI 0)       Assessment/Plan     Active Hospital Problems    Diagnosis  POA   • **Acute lower GI bleeding [K92.2]  Yes   • Stage 3a chronic kidney disease (HCC) [N18.31]  Yes   • Acute blood loss anemia [D62]  Yes   • Abnormal CT of the abdomen [R93.5]  Yes   • Left hip pain [M25.552]  Yes   • Pain in both lower extremities [M79.604, M79.605]  Yes   • Essential hypertension [I10]  Yes   • Type 2 diabetes mellitus with hyperglycemia, with long-term current use of insulin (HCC) [E11.65, Z79.4]  Not Applicable      Resolved Hospital Problems   No resolved problems to display.       Ms. Gramajo is an 82 yr old female w/Hx of HLD, DM, asthma, HTN who presents with rectal bleeding, admitted for acute lower GI bleeding     Acute lower GI bleeding/Abnormal CT: + hemoccult stool. S/P c scope 12/27. Follow pathology. Hgb stable; transfuse if less than 7.  GI recommending to restart AC in 48-72 hours if no further bleeding. Advance diet as tolerated     ABLA: S/P 1 unit PRBC 12/26. Monitor Hgb. Transfuse as needed     Lt hip pain/BLE pain: D dimer negative. Hip and L spine xrays nothing acute. Concern for neuropathy, started on low dose gabapentin. OT/PT following     HTN: BP acceptable acutely. Continues on  amlodipine; ACE-I held, can likely resume near discharge     DM: BG trends reviewed.  Correctional scale insulin. Continue basal insulin. Monitor     CKD III: Cr stable. Avoid nephrotoxic meds     BLE edema: Stop IVF's. BLE doppler neg. Encourage OOB       · SCDs for DVT prophylaxis.  · Full code.  · Discussed with patient and family.  · Anticipate discharge home with family tomorrow.      WALTER Tarango  [facility]  12/28/22  12:17 EST

## 2022-12-28 NOTE — PLAN OF CARE
Goal Outcome Evaluation:  Plan of Care Reviewed With: patient, daughter           Outcome Evaluation: Pt is a 83 y/o F admitted to [facility] with a GI bleed s/p colonoscopy yesterday. Pt received in bed upon arrival and agreeable to PT eval. Pt reports she lives alone with 3 JUAN and was (I) with mobility with mulitple AD's including a walking stick, SC, and rollator. Daughter plans to stay to assist at D/C. Pt reached sitting EOB with SPV and denied any reports of dizziness. Pt stood x 2 from both bed & toilet with SPV. Pt ambulated 200' c walking stick requiring SBA and completed 8 steps c HR requiring CGA. Pt presents with slow pace but no unsteadiness. PT recommends home with assist at D/C. At this time there are no concerns for safety or mobility as pt is at functional baseline. PT cleared pt to ambulate with family or staff throughout the day. PT will sign off.

## 2022-12-29 ENCOUNTER — READMISSION MANAGEMENT (OUTPATIENT)
Dept: CALL CENTER | Facility: HOSPITAL | Age: 82
End: 2022-12-29

## 2022-12-29 VITALS
RESPIRATION RATE: 16 BRPM | WEIGHT: 147.2 LBS | SYSTOLIC BLOOD PRESSURE: 142 MMHG | HEART RATE: 85 BPM | HEIGHT: 58 IN | TEMPERATURE: 98.3 F | BODY MASS INDEX: 30.9 KG/M2 | DIASTOLIC BLOOD PRESSURE: 73 MMHG | OXYGEN SATURATION: 90 %

## 2022-12-29 LAB
GLUCOSE BLDC GLUCOMTR-MCNC: 175 MG/DL (ref 70–130)
GLUCOSE BLDC GLUCOMTR-MCNC: 222 MG/DL (ref 70–130)
HOLD SPECIMEN: NORMAL
LAB AP CASE REPORT: NORMAL
LAB AP DIAGNOSIS COMMENT: NORMAL
PATH REPORT.ADDENDUM SPEC: NORMAL
PATH REPORT.FINAL DX SPEC: NORMAL
PATH REPORT.GROSS SPEC: NORMAL

## 2022-12-29 PROCEDURE — 99232 SBSQ HOSP IP/OBS MODERATE 35: CPT | Performed by: INTERNAL MEDICINE

## 2022-12-29 PROCEDURE — 82962 GLUCOSE BLOOD TEST: CPT

## 2022-12-29 PROCEDURE — 63710000001 INSULIN LISPRO (HUMAN) PER 5 UNITS: Performed by: INTERNAL MEDICINE

## 2022-12-29 RX ORDER — AMLODIPINE BESYLATE 5 MG/1
5 TABLET ORAL DAILY
Qty: 60 TABLET | Refills: 1
Start: 2022-12-29 | End: 2023-01-03 | Stop reason: SDUPTHER

## 2022-12-29 RX ADMIN — PANTOPRAZOLE SODIUM 40 MG: 40 INJECTION, POWDER, FOR SOLUTION INTRAVENOUS at 06:15

## 2022-12-29 RX ADMIN — INSULIN LISPRO 2 UNITS: 100 INJECTION, SOLUTION INTRAVENOUS; SUBCUTANEOUS at 11:18

## 2022-12-29 RX ADMIN — GABAPENTIN 100 MG: 100 CAPSULE ORAL at 08:32

## 2022-12-29 RX ADMIN — Medication 10 ML: at 08:33

## 2022-12-29 RX ADMIN — INSULIN GLARGINE-YFGN 20 UNITS: 100 INJECTION, SOLUTION SUBCUTANEOUS at 08:32

## 2022-12-29 RX ADMIN — AMLODIPINE BESYLATE 5 MG: 5 TABLET ORAL at 08:32

## 2022-12-29 NOTE — PROGRESS NOTES
[facility]  Inpatient Progress Note    Reason for Followup:  hematochezia    Subjective:  Reports most recent BM with liquid and no blood.  No abd pain.     Current Facility-Administered Medications:   •  acetaminophen (TYLENOL) tablet 650 mg, 650 mg, Oral, Q4H PRN **OR** acetaminophen (TYLENOL) 160 MG/5ML solution 650 mg, 650 mg, Oral, Q4H PRN **OR** acetaminophen (TYLENOL) suppository 650 mg, 650 mg, Rectal, Q4H PRN, Scott Dale MD  •  albuterol (PROVENTIL) nebulizer solution 0.083% 2.5 mg/3mL, 2.5 mg, Nebulization, Q6H PRN, Scott Dale MD  •  amLODIPine (NORVASC) tablet 5 mg, 5 mg, Oral, Q24H, Scott Dale MD, 5 mg at 12/28/22 0845  •  dextrose (D50W) (25 g/50 mL) IV injection 25 g, 25 g, Intravenous, Q15 Min PRN, Scott Dale MD, 25 g at 12/27/22 1925  •  dextrose (GLUTOSE) oral gel 15 g, 15 g, Oral, Q15 Min PRN, Scott Dale MD  •  gabapentin (NEURONTIN) capsule 100 mg, 100 mg, Oral, Q12H, Soctt Dale MD, 100 mg at 12/28/22 0845  •  glucagon (human recombinant) (GLUCAGEN DIAGNOSTIC) injection 1 mg, 1 mg, Intramuscular, Q15 Min PRN, Scott Dale MD  •  insulin glargine (LANTUS, SEMGLEE) injection 20 Units, 20 Units, Subcutaneous, Daily, Scott Dale MD, 20 Units at 12/26/22 1147  •  insulin lispro (ADMELOG) injection 0-7 Units, 0-7 Units, Subcutaneous, TID AC, Scott Dale MD, 5 Units at 12/28/22 1713  •  lidocaine (LIDODERM) 5 % 1 patch, 1 patch, Transdermal, Q24H, Scott Dale MD, 1 patch at 12/28/22 0846  •  morphine injection 2 mg, 2 mg, Intravenous, Once, Scott Dale MD  •  nitroglycerin (NITROSTAT) SL tablet 0.4 mg, 0.4 mg, Sublingual, Q5 Min PRN, Scott Dale MD  •  ondansetron (ZOFRAN) injection 4 mg, 4 mg, Intravenous, Once, Scott Dale MD  •  ondansetron (ZOFRAN) injection 4 mg, 4 mg, Intravenous, Q6H PRN, Scott Dale MD  •  pantoprazole (PROTONIX) injection 40 mg, 40 mg, Intravenous, Q AM, Scott Dale MD, 40 mg at 12/28/22 0605  •   potassium chloride (K-DUR,KLOR-CON) ER tablet 40 mEq, 40 mEq, Oral, PRN, 40 mEq at 12/26/22 1457 **OR** potassium chloride (KLOR-CON) packet 40 mEq, 40 mEq, Oral, PRN **OR** potassium chloride 10 mEq in 100 mL IVPB, 10 mEq, Intravenous, Q1H PRN, Scott Dale MD  •  [COMPLETED] Insert Peripheral IV, , , Once **AND** sodium chloride 0.9 % flush 10 mL, 10 mL, Intravenous, PRN, Scott Dale MD  •  sodium chloride 0.9 % flush 10 mL, 10 mL, Intravenous, Q12H, Scott Dale MD, 10 mL at 12/28/22 0848  •  sodium chloride 0.9 % flush 10 mL, 10 mL, Intravenous, PRN, Scott Dale MD  •  sodium chloride 0.9 % infusion 1,000 mL, 1,000 mL, Intravenous, Continuous, Scott Dael MD, Last Rate: 25 mL/hr at 12/27/22 1006, Restarted at 12/27/22 1056  •  sodium chloride 0.9 % infusion 40 mL, 40 mL, Intravenous, PRN, Scott Dale MD  Review of Systems:   Gen: No fever or chills  Resp: No cough or SOA  CV: No chest pain or palpitations  GI: No abd pain, nausea, vomiting      Objective     Vital Signs  Temp:  [97.2 °F (36.2 °C)-98.5 °F (36.9 °C)] 98.5 °F (36.9 °C)  Heart Rate:  [71-84] 82  Resp:  [16] 16  BP: (126-150)/(54-69) 150/64  Body mass index is 30.76 kg/m².    Intake/Output Summary (Last 24 hours) at 12/28/2022 1924  Last data filed at 12/28/2022 1835  Gross per 24 hour   Intake 760 ml   Output --   Net 760 ml     No intake/output data recorded.     Physical Exam:   General: patient awake, alert and cooperative   Eyes: Normal lids and lashes, no scleral icterus   Neck: supple, normal ROM   Skin: warm and dry, not jaundiced   Cardiovascular: regular rate, regular rhythm, well-perfused extremities   Pulm: Equal expansion bilaterally, no increased WOB   Abdomen: soft, nontender, nondistended;    Extremities: no rash or edema              Neuro: A&O, no obvious sign of focal deficit   Psychiatric: Normal mood and behavior; memory intact       Results Review:     I reviewed the patient's new clinical results.    Results  from last 7 days   Lab Units 12/28/22  1628 12/28/22  0709 12/27/22  2345 12/27/22  1902 12/27/22  1528 12/27/22  0755   WBC 10*3/mm3  --  6.15  --  9.78  --  8.55   HEMOGLOBIN g/dL 8.9* 8.2* 8.3* 8.5*   < > 10.0*   HEMATOCRIT % 26.9* 24.8* 23.9* 25.9*   < > 30.3*   PLATELETS 10*3/mm3  --  165  --  195  --  192    < > = values in this interval not displayed.     Results from last 7 days   Lab Units 12/28/22  0709 12/27/22  0755 12/26/22  0556 12/26/22  0205   SODIUM mmol/L 142 137 139 135*   POTASSIUM mmol/L 3.5 4.2 3.4* 4.2   CHLORIDE mmol/L 112* 109* 105 100   CO2 mmol/L 24.5 20.0* 25.9 25.6   BUN mg/dL 8 15 24* 27*   CREATININE mg/dL 0.64 0.69 0.91 1.02*   CALCIUM mg/dL 7.9* 8.3* 8.4* 8.8   BILIRUBIN mg/dL  --  0.5  --  0.2   ALK PHOS U/L  --  98  --  107   ALT (SGPT) U/L  --  13  --  13   AST (SGOT) U/L  --  18  --  13   GLUCOSE mg/dL 83 185* 201* 519*     Results from last 7 days   Lab Units 12/27/22  0755 12/26/22  0205   INR  0.95 1.01     No results found for: LIPASE    Radiology:  [unfilled]      Assessment & Plan   Assessment:   1. Rectal bleeding  2. Anemia  3. 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

## 2022-12-29 NOTE — NURSING NOTE
Patient refusing labs. Education provided on why labs are necessary. Patient wants to speak with MD about it.

## 2022-12-29 NOTE — PROGRESS NOTES
[facility], [place], [facility] by [facility]  Inpatient Progress Note    Reason for Follow Up: Hematochezia    Subjective     Interval History:   No further bleeding.  Denies abdominal pain.  Tolerating clear liquids without nausea or vomiting    Current Facility-Administered Medications:   •  acetaminophen (TYLENOL) tablet 650 mg, 650 mg, Oral, Q4H PRN **OR** acetaminophen (TYLENOL) 160 MG/5ML solution 650 mg, 650 mg, Oral, Q4H PRN **OR** acetaminophen (TYLENOL) suppository 650 mg, 650 mg, Rectal, Q4H PRN, Scott Dale MD  •  albuterol (PROVENTIL) nebulizer solution 0.083% 2.5 mg/3mL, 2.5 mg, Nebulization, Q6H PRN, Scott Dale MD  •  amLODIPine (NORVASC) tablet 5 mg, 5 mg, Oral, Q24H, Scott Dale MD, 5 mg at 12/29/22 0832  •  dextrose (D50W) (25 g/50 mL) IV injection 25 g, 25 g, Intravenous, Q15 Min PRN, Scott Dale MD, 25 g at 12/27/22 1925  •  dextrose (GLUTOSE) oral gel 15 g, 15 g, Oral, Q15 Min PRN, Scott aDle MD  •  glucagon (human recombinant) (GLUCAGEN DIAGNOSTIC) injection 1 mg, 1 mg, Intramuscular, Q15 Min PRN, Scott Dale MD  •  insulin glargine (LANTUS, SEMGLEE) injection 20 Units, 20 Units, Subcutaneous, Daily, Scott Dale MD, 20 Units at 12/29/22 0832  •  insulin lispro (ADMELOG) injection 0-7 Units, 0-7 Units, Subcutaneous, TID AC, Scott Dale MD, 5 Units at 12/28/22 1713  •  lidocaine (LIDODERM) 5 % 1 patch, 1 patch, Transdermal, Q24H, Scott Dale MD, 1 patch at 12/28/22 0846  •  morphine injection 2 mg, 2 mg, Intravenous, Once, Scott Dale MD  •  nitroglycerin (NITROSTAT) SL tablet 0.4 mg, 0.4 mg, Sublingual, Q5 Min PRN, Scott Dale MD  •  ondansetron (ZOFRAN) injection 4 mg, 4 mg, Intravenous, Once, Scott Dale MD  •  ondansetron (ZOFRAN) injection 4 mg, 4 mg, Intravenous, Q6H PRN, Scott Dale MD  •  pantoprazole (PROTONIX) injection 40 mg, 40 mg, Intravenous, Q AM, Scott Dale MD, 40 mg at 12/29/22 0615  •  potassium chloride (K-DUR,KLOR-CON) ER tablet 40 mEq, 40  mEq, Oral, PRN, 40 mEq at 12/26/22 1457 **OR** potassium chloride (KLOR-CON) packet 40 mEq, 40 mEq, Oral, PRN **OR** potassium chloride 10 mEq in 100 mL IVPB, 10 mEq, Intravenous, Q1H PRN, Scott Dale MD  •  [COMPLETED] Insert Peripheral IV, , , Once **AND** sodium chloride 0.9 % flush 10 mL, 10 mL, Intravenous, PRN, Scott Dale MD  •  sodium chloride 0.9 % flush 10 mL, 10 mL, Intravenous, Q12H, Scott Dale MD, 10 mL at 12/29/22 0833  •  sodium chloride 0.9 % flush 10 mL, 10 mL, Intravenous, PRN, Scott Dale MD  •  sodium chloride 0.9 % infusion 40 mL, 40 mL, Intravenous, PRN, Scott Dale MD  Review of Systems:    Negative for fevers or chills, negative for nausea vomiting or blood in the stool    Objective     Vital Signs  Temp:  [97.4 °F (36.3 °C)-98.5 °F (36.9 °C)] 97.6 °F (36.4 °C)  Heart Rate:  [70-93] 70  Resp:  [16] 16  BP: (137-153)/(63-70) 145/68  Body mass index is 30.76 kg/m².    Intake/Output Summary (Last 24 hours) at 12/29/2022 1045  Last data filed at 12/29/2022 0900  Gross per 24 hour   Intake 880 ml   Output --   Net 880 ml     I/O this shift:  In: 240 [P.O.:240]  Out: -      Physical Exam:   General: patient awake, alert and cooperative   Eyes: Normal lids and lashes, no scleral icterus   Neck: supple, normal ROM   Skin: warm and dry, not jaundiced   Pulm:   regular and unlabored   Abdomen: soft, nontender, nondistended    Psychiatric: Normal mood and behavior; memory intact     Results Review:     I reviewed the patient's new clinical results.    Results from last 7 days   Lab Units 12/28/22 2012 12/28/22  1628 12/28/22  0709 12/27/22  2345 12/27/22  1902   WBC 10*3/mm3 6.90  --  6.15  --  9.78   HEMOGLOBIN g/dL 8.8* 8.9* 8.2*   < > 8.5*   HEMATOCRIT % 27.1* 26.9* 24.8*   < > 25.9*   PLATELETS 10*3/mm3 215  --  165  --  195    < > = values in this interval not displayed.     Results from last 7 days   Lab Units 12/28/22  0709 12/27/22  0755 12/26/22  0556 12/26/22  0205   SODIUM  mmol/L 142 137 139 135*   POTASSIUM mmol/L 3.5 4.2 3.4* 4.2   CHLORIDE mmol/L 112* 109* 105 100   CO2 mmol/L 24.5 20.0* 25.9 25.6   BUN mg/dL 8 15 24* 27*   CREATININE mg/dL 0.64 0.69 0.91 1.02*   CALCIUM mg/dL 7.9* 8.3* 8.4* 8.8   BILIRUBIN mg/dL  --  0.5  --  0.2   ALK PHOS U/L  --  98  --  107   ALT (SGPT) U/L  --  13  --  13   AST (SGOT) U/L  --  18  --  13   GLUCOSE mg/dL 83 185* 201* 519*     Results from last 7 days   Lab Units 12/27/22  0755 12/26/22  0205   INR  0.95 1.01     No results found for: LIPASE    Radiology:  XR Spine Lumbar Complete With Flex & Ext   Final Result      XR Hip With or Without Pelvis 2 - 3 View Left   Final Result      CT Abdomen Pelvis With Contrast   Final Result       1. Colonic diverticulosis.   2. There is a somewhat thick-walled appearance to the patient's rectum.   This may be related to incomplete distention. Proctitis is not   completely excluded.   3. Thick-walled appearance to the urinary bladder. Correlation with   urinalysis and cultures is recommended.   4. Small enhancing focus within the right hepatic lobe is indeterminate,   although favored to be benign. It is in proximity to both portal and   hepatic venous branches, and may reflect a small fistula.       Radiation dose reduction techniques were utilized, including automated   exposure control and exposure modulation based on body size.       This report was finalized on 12/26/2022 4:52 AM by Dr. Katerin Gerber M.D.              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 Anemia  3. Afib on Eliquis       Plan:  Hemoglobin has been stable-no further bleeding    Advance diet    Pathology reviewed-benign changes seen.  discussed with patient    Stable for discharge from GI standpoint    Okay to resume Eliquis on 1/3      I discussed the patients findings and my recommendations with patient.    Rita Mike MD

## 2022-12-29 NOTE — DISCHARGE SUMMARY
Patient Name: Antonietta Gramajo  : 1940  MRN: [id number]    Date of Admission: 2022  Date of Discharge:  2022  Primary Care Physician: Gemini Catherine MD      Chief Complaint:   Rectal Bleeding      Discharge Diagnoses     Active Hospital Problems    Diagnosis  POA   • **Acute lower GI bleeding [K92.2]  Yes   • Stage 3a chronic kidney disease (HCC) [N18.31]  Yes   • Acute blood loss anemia [D62]  Yes   • Abnormal CT of the abdomen [R93.5]  Yes   • Left hip pain [M25.552]  Yes   • Pain in both lower extremities [M79.604, M79.605]  Yes   • Essential hypertension [I10]  Yes   • Type 2 diabetes mellitus with hyperglycemia, with long-term current use of insulin (HCC) [E11.65, Z79.4]  Not Applicable      Resolved Hospital Problems   No resolved problems to display.        Hospital Course     Ms. Gramajo is a 82 y.o. female with a history ofHLD, DM II, asthma, HTN, paroxysmal afib on AC  who presented to [facility] initially complaining of dark colored stools.  Please see the admitting history and physical for further details.  She was found to have Hgb 7.7, positive hemoccult stool and was admitted to the hospital for further evaluation and treatment.  Anticoagulation was held. GI was consulted. 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 Hgb has been stable. She required 1 unit PRBC .  No further evidence of bleeding. Pathology benign per GI. She will need to remain off AC until 1/3/23. Recommend repeat CBC in 1 week. She complained of Lt hip and BLE pain on admission. Xrays were negative. D dimer was negative. BLE dopplers were completed, negative for DVT. She was started on low dose gabapentin but developed worsening edema, now stopped. Compression stockings were recommended. OT/PT have evaluated. Medically she is stable for discharge.   Day  of Discharge     Subjective:  No further bleeding. Appetite fair. Continued BLE edema. Denies chest pain, shortness of breath. Agrees with discharge    Physical Exam:  Temp:  [97.4 °F (36.3 °C)-98.5 °F (36.9 °C)] 98.3 °F (36.8 °C)  Heart Rate:  [70-93] 85  Resp:  [16] 16  BP: (137-153)/(63-73) 142/73  Body mass index is 30.76 kg/m².  Physical Exam  Vitals and nursing note reviewed.   Constitutional:       General: She is not in acute distress.  HENT:      Head: Normocephalic.      Mouth/Throat:      Mouth: Mucous membranes are moist.   Eyes:      Conjunctiva/sclera: Conjunctivae normal.   Cardiovascular:      Rate and Rhythm: Normal rate and regular rhythm.   Pulmonary:      Effort: Pulmonary effort is normal. No respiratory distress.      Breath sounds: No wheezing or rales.   Abdominal:      General: Bowel sounds are normal.      Palpations: Abdomen is soft.   Musculoskeletal:      Cervical back: Neck supple.      Right lower leg: Edema present.      Left lower leg: Edema present.      Comments: 2+   Skin:     General: Skin is warm and dry.   Neurological:      Mental Status: She is alert. Mental status is at baseline.   Psychiatric:         Mood and Affect: Mood normal.         Behavior: Behavior normal.         Consultants     Consult Orders (all) (From admission, onward)     Start     Ordered    12/26/22 0820  Inpatient Case Management  Consult  Once        Provider:  (Not yet assigned)    12/26/22 0819    12/26/22 0533  Inpatient Gastroenterology Consult  Once        Specialty:  Gastroenterology  Provider:  Rita Mike MD    12/26/22 0533    12/26/22 0500  [facility] (on-call MD unless specified) Details  Once        Specialty:  Hospitalist  Provider:  (Not yet assigned)    12/26/22 0459              Procedures     COLONOSCOPY INTO CECUM AND T.I. WITH APC, COLD SNARE POLYPECTOMY, COLD BIOPSIES AND CLIP PLACEMENT X1      Imaging Results (All)     Procedure Component Value Units Date/Time    XR  Hip With or Without Pelvis 2 - 3 View Left [[id number]] Collected: 12/26/22 1553     Updated: 12/26/22 1707    Narrative:      X-RAY HIP WITH OR WITHOUT PELVIS TWO-THREE VIEW LEFT, X-RAY SPINE LUMBAR  COMPLETE WITH FLEXION/EXTENSION     CLINICAL INFORMATION: Pain.     AP PELVIS/LEFT HIP FINDINGS: Narrowing of both hip joints as well as the  symphysis pubis. There is bilateral hip subchondral sclerosis. No  avascular necrosis, bone lesion, fracture or dislocation. Soft tissues  have a satisfactory appearance.     CONCLUSION: Symmetric degenerative change as described above. No acute  osseous abnormality or left hip AVN.     LUMBAR SPINE FINDINGS: Six nonrib-bearing lumbar segments. There is 35  degrees of rotatory scoliosis convexity towards the right. The degree of  scoliosis has progressed since 10/19/2009. Moderate to substantial  multilevel disc degeneration with endplate hypertrophy and spurring.  There is multilevel disc degeneration in lower thoracic spine. No focal  AP subluxation. The degree of flexion and extension appears limited. No  gross compression deformity.     CONCLUSION: Scoliosis with degenerative change as described above. The  degree of flexion and extension is limited, no subluxation identified.     This report was finalized on 12/26/2022 5:04 PM by Dr. Kendell Ferrer M.D.       XR Spine Lumbar Complete With Flex & Ext [[id number]] Collected: 12/26/22 1553     Updated: 12/26/22 1707    Narrative:      X-RAY HIP WITH OR WITHOUT PELVIS TWO-THREE VIEW LEFT, X-RAY SPINE LUMBAR  COMPLETE WITH FLEXION/EXTENSION     CLINICAL INFORMATION: Pain.     AP PELVIS/LEFT HIP FINDINGS: Narrowing of both hip joints as well as the  symphysis pubis. There is bilateral hip subchondral sclerosis. No  avascular necrosis, bone lesion, fracture or dislocation. Soft tissues  have a satisfactory appearance.     CONCLUSION: Symmetric degenerative change as described above. No acute  osseous abnormality or left hip AVN.      LUMBAR SPINE FINDINGS: Six nonrib-bearing lumbar segments. There is 35  degrees of rotatory scoliosis convexity towards the right. The degree of  scoliosis has progressed since 10/19/2009. Moderate to substantial  multilevel disc degeneration with endplate hypertrophy and spurring.  There is multilevel disc degeneration in lower thoracic spine. No focal  AP subluxation. The degree of flexion and extension appears limited. No  gross compression deformity.     CONCLUSION: Scoliosis with degenerative change as described above. The  degree of flexion and extension is limited, no subluxation identified.     This report was finalized on 12/26/2022 5:04 PM by Dr. Kendell Ferrer M.D.       CT Abdomen Pelvis With Contrast [[id number]] Collected: 12/26/22 0436     Updated: 12/26/22 0455    Narrative:      CT OF THE ABDOMEN AND PELVIS WITH CONTRAST     HISTORY: Left lower quadrant pain     COMPARISON: 03/18/2019     TECHNIQUE: Axial CT imaging was obtained through the abdomen and pelvis.  IV contrast was administered.     FINDINGS:  Images through the lung bases do not demonstrate any acute  abnormalities. Images are degraded by motion artifact. There is a small  enhancing focus identified within the right hepatic lobe. It measures up  to 1 cm in size. Exact etiology is uncertain. It may reflect a small  portal-hepatic venous fistula, but is favored to be benign. There is a  small hiatal hernia. Duodenum appears unremarkable. There is a right  adrenal nodule. This appears to be stable in size to 2019. Left adrenal  gland and spleen are normal. Gallbladder is surgically absent. Pancreas  is atrophic. The kidneys enhance symmetrically. No hydronephrosis is  seen. There is an area of cortical scarring with associated dystrophic  calcification in the right kidney. There is a simple appearing left  renal cyst. Urinary bladder appears somewhat thick-walled. Some of this  may be related to incomplete distention, but correlation  with urinalysis  and urine cultures is recommended. Uterus is surgically absent. There is  no bowel obstruction. There is colonic diverticulosis. I don't see  evidence of diverticulitis. There is potentially a thick-walled  appearance to the patient's rectum. Some of the appearance may be  related to incomplete distention, although some proctitis is not  excluded. There is no evidence of appendicitis. No acute osseous  abnormalities are seen. There is lumbar scoliosis, with convexity to the  right.       Impression:         1. Colonic diverticulosis.  2. There is a somewhat thick-walled appearance to the patient's rectum.  This may be related to incomplete distention. Proctitis is not  completely excluded.  3. Thick-walled appearance to the urinary bladder. Correlation with  urinalysis and cultures is recommended.  4. Small enhancing focus within the right hepatic lobe is indeterminate,  although favored to be benign. It is in proximity to both portal and  hepatic venous branches, and may reflect a small fistula.     Radiation dose reduction techniques were utilized, including automated  exposure control and exposure modulation based on body size.     This report was finalized on 12/26/2022 4:52 AM by Dr. Katerin Gerber M.D.             Results for orders placed during the hospital encounter of 07/18/20    Adult Transthoracic Echo Complete W/ Cont if Necessary Per Protocol    Interpretation Summary  · Calculated EF = 69.0%.  · Left ventricular systolic function is normal.  · Mild aortic valve stenosis is present.  · Aortic valve area is 1.4 cm2.  · Aortic valve mean pressure gradient is 8.0 mmHg.  · Aortic valve maximum pressure gradient is 18.8 mmHg.  · Aortic valve dimensionless index is 0.6.  · Mild mitral valve regurgitation is present  · Mild tricuspid valve regurgitation is present.  · Estimated right ventricular systolic pressure from tricuspid regurgitation is mildly elevated (35-45 mmHg).  · Calculated  right ventricular systolic pressure from tricuspid regurgitation is 37 mmHg.  · Left ventricular diastolic dysfunction (grade I) consistent with impaired relaxation.  · Saline test results are negative.    Pertinent Labs     Results from last 7 days   Lab Units 12/28/22 2012 12/28/22  1628 12/28/22  0709 12/27/22  2345 12/27/22  1902 12/27/22  1528 12/27/22  0755   WBC 10*3/mm3 6.90  --  6.15  --  9.78  --  8.55   HEMOGLOBIN g/dL 8.8* 8.9* 8.2* 8.3* 8.5*   < > 10.0*   PLATELETS 10*3/mm3 215  --  165  --  195  --  192    < > = values in this interval not displayed.     Results from last 7 days   Lab Units 12/28/22  0709 12/27/22  0755 12/26/22  0556 12/26/22  0205   SODIUM mmol/L 142 137 139 135*   POTASSIUM mmol/L 3.5 4.2 3.4* 4.2   CHLORIDE mmol/L 112* 109* 105 100   CO2 mmol/L 24.5 20.0* 25.9 25.6   BUN mg/dL 8 15 24* 27*   CREATININE mg/dL 0.64 0.69 0.91 1.02*   GLUCOSE mg/dL 83 185* 201* 519*   EGFR mL/min/1.73 88.4 86.8 63.1 55.0*     Results from last 7 days   Lab Units 12/27/22  0755 12/26/22  0205   ALBUMIN g/dL 3.4* 3.30*   BILIRUBIN mg/dL 0.5 0.2   ALK PHOS U/L 98 107   AST (SGOT) U/L 18 13   ALT (SGPT) U/L 13 13     Results from last 7 days   Lab Units 12/28/22  0709 12/27/22  0755 12/26/22  0556 12/26/22  0205   CALCIUM mg/dL 7.9* 8.3* 8.4* 8.8   ALBUMIN g/dL  --  3.4*  --  3.30*       Results from last 7 days   Lab Units 12/26/22  1406   D DIMER QUANT MCGFEU/mL 0.45           Invalid input(s): LDLCALC          Test Results Pending at Discharge       Discharge Details        Discharge Medications      Changes to Medications      Instructions Start Date   amLODIPine 5 MG tablet  Commonly known as: NORVASC  What changed: when to take this   5 mg, Oral, Daily      apixaban 5 MG tablet tablet  Commonly known as: ELIQUIS  What changed: These instructions start on January 3, 2023. If you are unsure what to do until then, ask your doctor or other care provider.   5 mg, Oral, Every 12 Hours Scheduled   Start  Date: January 3, 2023        Continue These Medications      Instructions Start Date   albuterol sulfate  (90 Base) MCG/ACT inhaler  Commonly known as: ProAir HFA   1 puff, Inhalation, Every 4 Hours PRN      Aspirin 81 MG capsule   81 mg, Oral, Daily      B-D UF III MINI PEN NEEDLES 31G X 5 MM misc  Generic drug: Insulin Pen Needle   USE ONCE DAILY AS DIRECTED 4 x daily. Dx. E11.65      glucose blood test strip   Check blood glucose 3-4 times dialy      Insulin Lispro (1 Unit Dial) 100 UNIT/ML solution pen-injector  Commonly known as: HumaLOG KwikPen   Use as directed for meal & correction coverage up to 50 units a day. 6 units plus scale based on glucose of 1 units :30 mg/dl glucose over 140 mg/dl glucose      lisinopril 40 MG tablet  Commonly known as: PRINIVIL,ZESTRIL   40 mg, Oral, Daily      metFORMIN 1000 MG tablet  Commonly known as: GLUCOPHAGE   1,000 mg, Oral, Daily With Breakfast      Omeprazole 20 MG tablet delayed-release   Oral      OneTouch Delica Lancets 33G misc   Check blood glucose 3 times daily DX CODE: e11.65      Tresiba FlexTouch 200 UNIT/ML solution pen-injector pen injection  Generic drug: Insulin Degludec   24 Units, Subcutaneous, Daily      Triamcinolone Acetonide 55 MCG/ACT nasal inhaler  Commonly known as: NASACORT   2 sprays, Nasal, Daily PRN      vitamin C 250 MG tablet  Commonly known as: ASCORBIC ACID   500 mg, Oral, Daily             Allergies   Allergen Reactions   • Sulfa Antibiotics Swelling   • Atorvastatin Other (See Comments)     Body aches   • Cycloset [Bromocriptine] Other (See Comments)     Sores in mouth and fatigue   • Invokana [Canagliflozin] Rash     rash   • Oxycodone-Acetaminophen GI Intolerance   • Propoxyphene-Acetaminophen GI Intolerance   • Victoza [Liraglutide] Rash       Discharge Disposition:  Home or Self Care      Discharge Diet:  Diet Order   Procedures   • Diet: Cardiac Diets; Healthy Heart (2-3 Na+); Texture: Regular Texture (IDDSI 7); Fluid  Consistency: Thin (IDDSI 0)       Discharge Activity:   Activity Instructions     Activity as Tolerated            CODE STATUS:    Code Status and Medical Interventions:   Ordered at: 12/26/22 0533     Code Status (Patient has no pulse and is not breathing):    CPR (Attempt to Resuscitate)     Medical Interventions (Patient has pulse or is breathing):    Full Support       No future appointments.   Follow-up Information     Gemini Catherine MD. Schedule an appointment as soon as possible for a visit in 1 week(s).    Specialty: Family Medicine  Why: Needs CBC within 1 week  Contact information:  [address]  [address]  [facility] [address] [phone]             Daryl Kaiser MD Follow up.    Specialty: Gastroenterology  Why: As needed  Contact information:  [address]  [address]  [place] [id number] [phone]                         Time Spent on Discharge:  Greater than 30 minutes      WALTER Tarango  [facility]  12/29/22  14:51 EST

## 2022-12-29 NOTE — PLAN OF CARE
Goal Outcome Evaluation:         A&Ox4, R/A, VSS, no c/o of discomfort throughout the night, pt's diet advanced to full liquid and is tolerating well, daughter @ bedside, anticipating discharge today with home health, will continue to monitor

## 2022-12-29 NOTE — OUTREACH NOTE
Prep Survey    Flowsheet Row Responses   [place] facility patient discharged from? [facility]   Is LACE score < 7 ? No   Eligibility [facility]   Date of Admission 12/26/22   Date of Discharge 12/29/22   Discharge Disposition Home or Self Care   Discharge diagnosis Acute lower GI bleeding Acute blood loss anemia  Stage 3a chronic kidney disease    Does the patient have one of the following disease processes/diagnoses(primary or secondary)? Other   Does the patient have Home health ordered? No   Is there a DME ordered? No   Prep survey completed? Yes          DEANDRE DEWITT - Registered Nurse

## 2022-12-30 ENCOUNTER — TRANSITIONAL CARE MANAGEMENT TELEPHONE ENCOUNTER (OUTPATIENT)
Dept: CALL CENTER | Facility: HOSPITAL | Age: 82
End: 2022-12-30

## 2022-12-30 NOTE — CASE MANAGEMENT/SOCIAL WORK
Case Management Discharge Note      Final Note: Discharged home, no known needs.  LAWRENCE Islas RN         Selected Continued Care - Discharged on 12/29/2022 Admission date: 12/26/2022 - Discharge disposition: Home or Self Care    Destination    No services have been selected for the patient.              Durable Medical Equipment    No services have been selected for the patient.              Dialysis/Infusion    No services have been selected for the patient.              Home Medical Care    No services have been selected for the patient.              Therapy    No services have been selected for the patient.              Community Resources    No services have been selected for the patient.              Community & DME    No services have been selected for the patient.                  Transportation Services  Private: Car    Final Discharge Disposition Code: 01 - home or self-care

## 2022-12-30 NOTE — OUTREACH NOTE
Call Center TCM Note    Flowsheet Row Responses   [facility] [place] patient discharged from? [facility]   Does the patient have one of the following disease processes/diagnoses(primary or secondary)? Other   TCM attempt successful? Yes   Call start time 1036   Call end time 1044   Discharge diagnosis Acute lower GI bleeding Acute blood loss anemia  Stage 3a chronic kidney disease    Is patient permission given to speak with other caregiver? Yes   List who call center can speak with Daughter-  Pari   Person spoke with today (if not patient) and relationship Pari   Meds reviewed with patient/caregiver? Yes   Prescription comments Daughter requesting order for gabapentin- gabapentin (NEURONTIN) capsule 100 mg- was on it in hospital but not dc home with medication   Is the patient taking all medications as directed (includes completed medication regime)? Yes   Comments Scheduled first available with JOSI DEWITT ON JAN 3rd for hospital fu   Does the patient have an appointment with their PCP within 7 days of discharge? No   Nursing Interventions Assisted patient with making appointment per protocol   Has home health visited the patient within 72 hours of discharge? N/A   Psychosocial issues? No   Did the patient receive a copy of their discharge instructions? Yes   Nursing interventions Reviewed instructions with patient   What is the patient's perception of their health status since discharge? Improving   Is the patient/caregiver able to teach back signs and symptoms related to disease process for when to call PCP? Yes   Is the patient/caregiver able to teach back signs and symptoms related to disease process for when to call 911? Yes   Is the patient/caregiver able to teach back the hierarchy of who to call/visit for symptoms/problems? PCP, Specialist, Home health nurse, Urgent Care, ED, 911 Yes   TCM call completed? Yes   Wrap up additional comments Daughter states mother is improving no concerns at this time   Call end  time 1044   Would this patient benefit from a Referral to [facility] Social Work? No   Is the patient interested in additional calls from an ambulatory ?  NOTE:  applies to high risk patients requiring additional follow-up. No          Cyndi Ramos RN    12/30/2022, 10:44 EST

## 2023-01-03 ENCOUNTER — OFFICE VISIT (OUTPATIENT)
Dept: INTERNAL MEDICINE | Facility: CLINIC | Age: 83
End: 2023-01-03
Payer: MEDICARE

## 2023-01-03 VITALS
TEMPERATURE: 98.2 F | HEIGHT: 58 IN | HEART RATE: 106 BPM | DIASTOLIC BLOOD PRESSURE: 82 MMHG | SYSTOLIC BLOOD PRESSURE: 168 MMHG | BODY MASS INDEX: 30.67 KG/M2 | OXYGEN SATURATION: 98 % | WEIGHT: 146.1 LBS

## 2023-01-03 DIAGNOSIS — K92.2 GASTROINTESTINAL HEMORRHAGE, UNSPECIFIED GASTROINTESTINAL HEMORRHAGE TYPE: Primary | ICD-10-CM

## 2023-01-03 LAB
BASOPHILS # BLD AUTO: 0.03 10*3/MM3 (ref 0–0.2)
BASOPHILS NFR BLD AUTO: 0.5 % (ref 0–1.5)
EOSINOPHIL # BLD AUTO: 0.1 10*3/MM3 (ref 0–0.4)
EOSINOPHIL NFR BLD AUTO: 1.5 % (ref 0.3–6.2)
ERYTHROCYTE [DISTWIDTH] IN BLOOD BY AUTOMATED COUNT: 12.7 % (ref 12.3–15.4)
HCT VFR BLD AUTO: 29.4 % (ref 34–46.6)
HGB BLD-MCNC: 9.7 G/DL (ref 12–15.9)
IMM GRANULOCYTES # BLD AUTO: 0.01 10*3/MM3 (ref 0–0.05)
IMM GRANULOCYTES NFR BLD AUTO: 0.2 % (ref 0–0.5)
LYMPHOCYTES # BLD AUTO: 1.2 10*3/MM3 (ref 0.7–3.1)
LYMPHOCYTES NFR BLD AUTO: 18.3 % (ref 19.6–45.3)
MCH RBC QN AUTO: 29.9 PG (ref 26.6–33)
MCHC RBC AUTO-ENTMCNC: 33 G/DL (ref 31.5–35.7)
MCV RBC AUTO: 90.7 FL (ref 79–97)
MONOCYTES # BLD AUTO: 0.45 10*3/MM3 (ref 0.1–0.9)
MONOCYTES NFR BLD AUTO: 6.9 % (ref 5–12)
NEUTROPHILS # BLD AUTO: 4.76 10*3/MM3 (ref 1.7–7)
NEUTROPHILS NFR BLD AUTO: 72.6 % (ref 42.7–76)
NRBC BLD AUTO-RTO: 0 /100 WBC (ref 0–0.2)
PLATELET # BLD AUTO: 368 10*3/MM3 (ref 140–450)
RBC # BLD AUTO: 3.24 10*6/MM3 (ref 3.77–5.28)
WBC # BLD AUTO: 6.55 10*3/MM3 (ref 3.4–10.8)

## 2023-01-03 PROCEDURE — 99495 TRANSJ CARE MGMT MOD F2F 14D: CPT | Performed by: INTERNAL MEDICINE

## 2023-01-03 PROCEDURE — 1160F RVW MEDS BY RX/DR IN RCRD: CPT | Performed by: INTERNAL MEDICINE

## 2023-01-03 PROCEDURE — 1111F DSCHRG MED/CURRENT MED MERGE: CPT | Performed by: INTERNAL MEDICINE

## 2023-01-03 PROCEDURE — 1159F MED LIST DOCD IN RCRD: CPT | Performed by: INTERNAL MEDICINE

## 2023-01-03 RX ORDER — LISINOPRIL 40 MG/1
40 TABLET ORAL DAILY
Qty: 90 TABLET | Refills: 1 | Status: SHIPPED | OUTPATIENT
Start: 2023-01-03 | End: 2023-02-03 | Stop reason: SDUPTHER

## 2023-01-03 RX ORDER — AMLODIPINE BESYLATE 5 MG/1
5 TABLET ORAL DAILY
Qty: 90 TABLET | Refills: 1
Start: 2023-01-03 | End: 2023-02-03 | Stop reason: SDUPTHER

## 2023-01-03 RX ORDER — VITAMIN E 268 MG
400 CAPSULE ORAL DAILY
COMMUNITY
End: 2023-02-15

## 2023-01-03 RX ORDER — CHOLECALCIFEROL (VITAMIN D3) 25 MCG
CAPSULE ORAL
COMMUNITY
End: 2023-02-15

## 2023-01-03 NOTE — PROGRESS NOTES
Transitional Care Follow Up Visit  Subjective     Antonietta Gramajo is a 82 y.o. female who presents for a transitional care management visit.    Within 48 business hours after discharge our office contacted her via telephone to coordinate her care and needs.      I reviewed and discussed the details of that call along with the discharge summary, hospital problems, inpatient lab results, inpatient diagnostic studies, and consultation reports with Antonietta.     Current outpatient and discharge medications have been reconciled for the patient.  Reviewed by: Trupti Antonio MD      Date of TCM Phone Call 12/29/2022   [facility]   Date of Admission 12/26/2022   Date of Discharge 12/29/2022   Discharge Disposition Home or Self Care     Risk for Readmission (LACE) Score: 12 (12/29/2022  6:00 AM)      History of Present Illness   Course During Hospital Stay: recently admitted for a bleeding AVM in right colon s/p APC.  Her hb did stabilize and she was sent home from the hospital.  Since she has been home from the hospital she has not been taking her blood pressure medication.  She denies feeling lightheaded or dizzy no headaches.  Apparently she has a history of noncompliance with medication.  She is also been holding her anticoagulation which she is on for her atrial fibrillation.  She denies any palpitations or shortness of breath.  She has been on asa for Afib but that is being head     The following portions of the patient's history were reviewed and updated as appropriate: allergies, current medications, past medical history, past social history and problem list.    Review of Systems    Objective   Physical Exam  Vitals reviewed.   Constitutional:       Appearance: She is well-developed.   HENT:      Head: Normocephalic and atraumatic.      Right Ear: External ear normal.      Left Ear: External ear normal.   Eyes:      Conjunctiva/sclera: Conjunctivae normal.      Pupils: Pupils are equal, round, and reactive to  light.   Neck:      Thyroid: No thyromegaly.      Trachea: No tracheal deviation.   Cardiovascular:      Rate and Rhythm: Normal rate and regular rhythm.      Heart sounds: Normal heart sounds.   Pulmonary:      Effort: Pulmonary effort is normal.      Breath sounds: Normal breath sounds.   Abdominal:      General: Bowel sounds are normal. There is no distension.      Palpations: Abdomen is soft.      Tenderness: There is no abdominal tenderness.   Musculoskeletal:         General: No deformity. Normal range of motion.      Cervical back: Normal range of motion.   Skin:     General: Skin is warm and dry.   Neurological:      Mental Status: She is alert and oriented to person, place, and time.   Psychiatric:         Behavior: Behavior normal.         Thought Content: Thought content normal.         Judgment: Judgment normal.         Assessment & Plan   Diagnoses and all orders for this visit:    1. Gastrointestinal hemorrhage, unspecified gastrointestinal hemorrhage type (Primary)  -     CBC & Differential    Other orders  -     amLODIPine (NORVASC) 5 MG tablet; Take 1 tablet by mouth Daily.  Dispense: 90 tablet; Refill: 1  -     lisinopril (PRINIVIL,ZESTRIL) 40 MG tablet; Take 1 tablet by mouth Daily.  Dispense: 90 tablet; Refill: 1        1.  Hematochezia: Patient says her stools are nearly back to normal bright red blood but occasionally little dark in her stool.  She is feeling much better eating better.  She is continue to hold her aspirin which I will have her do until I get her hemoglobin back.  She is scheduled to follow-up with Dr. Romero in a few weeks  2.  Hypertension: Patient has not been taking her blood pressure medication.  Her blood pressure is elevated she is going to resume this and again follow-up with Dr. Romero.

## 2023-01-04 NOTE — PROGRESS NOTES
Patient: Antonietta Gramajo        : 1940  Account: [id number]           Admit Date: 2022        How to Respond to this query:       a. Click New Note     b. Answer query within the yellow box.                c. Update the Problem List, if applicable.      If you have any questions about this query contact me at: [email or web address]     Dr. Mike:     Patient admitted with gastrointestinal hemorrhage. Patient was on Eliquis for atrial fibrillation that was held on admission. Patient underwent a colonoscopy, which revealed the following: small amount of hematin found in entire colon with maroon tinged fluid noted more in the left colon; small angiodysplastic lesion without bleeding in ascending colon that was treated with argon beam coagulation; small and large-mouthed diverticular found in entire colon that were extensively lavaged without evidence of bleeding; one 5 mm polyp in the seigmoid colon that was removed with cold snare, one 6 mm polypoid lesion in recto-sigmoid colon with overlying erythema, and internal hemorrhoids. Patient was treated with blood transfusion, lab monitoring, and IV Protonix.    After study, can you further clarify the suspected etiology of the gastrointestinal bleed as:      Acute gastrointestinal bleed due to angiodysplastic lesion         By submitting this query, we are merely seeking further clarification of documentation to accurately reflect all conditions that you are monitoring, evaluating, treating or that extend the hospitalization or utilize additional resources of care. Please utilize your independent clinical judgment when addressing the question(s) above.     This query and your response, once completed, will be entered into the legal medical record.    Sincerely,  Se RODRIGUEZ, HARRY, CCDS  Clinical Documentation Integrity Program

## 2023-02-03 ENCOUNTER — OFFICE VISIT (OUTPATIENT)
Dept: INTERNAL MEDICINE | Facility: CLINIC | Age: 83
End: 2023-02-03
Payer: MEDICARE

## 2023-02-03 VITALS
BODY MASS INDEX: 30.02 KG/M2 | HEIGHT: 58 IN | HEART RATE: 93 BPM | DIASTOLIC BLOOD PRESSURE: 90 MMHG | TEMPERATURE: 96.4 F | OXYGEN SATURATION: 100 % | SYSTOLIC BLOOD PRESSURE: 180 MMHG | WEIGHT: 143 LBS

## 2023-02-03 DIAGNOSIS — I10 ESSENTIAL HYPERTENSION: Primary | ICD-10-CM

## 2023-02-03 PROCEDURE — 99213 OFFICE O/P EST LOW 20 MIN: CPT | Performed by: FAMILY MEDICINE

## 2023-02-03 RX ORDER — AMLODIPINE BESYLATE 5 MG/1
5 TABLET ORAL DAILY
Qty: 30 TABLET | Refills: 1
Start: 2023-02-03 | End: 2023-02-15 | Stop reason: SDUPTHER

## 2023-02-03 RX ORDER — LISINOPRIL 40 MG/1
40 TABLET ORAL DAILY
Qty: 30 TABLET | Refills: 1 | Status: SHIPPED | OUTPATIENT
Start: 2023-02-03 | End: 2023-03-22 | Stop reason: SDUPTHER

## 2023-02-03 NOTE — PROGRESS NOTES
Subjective   Antonietta Gramajo is a 82 y.o. female.   Chief Complaint   Patient presents with   • Hypertension       History of Present Illness     1.  Hypertension- patient is on lisinopril 40 mg a day and off amlodipine 5 mg.  She says that she does not take amlodipine because she did not get it from pharmacy.  She did not bring her medications with her and she does not have list of medications with her.  She has no chest pain, no palpitations, no lightheadedness.  She gets a little short of breath when she walks fast.  No headache.  Creatinine 0.64, GFR 88.4. (12/2022).    The following portions of the patient's history were reviewed and updated as appropriate: allergies, current medications, past medical history, past social history and problem list.    Review of Systems   Cardiovascular: Negative for chest pain and palpitations.   Neurological: Negative for light-headedness.         Objective   Wt Readings from Last 3 Encounters:   02/03/23 64.9 kg (143 lb)   01/03/23 66.3 kg (146 lb 1.6 oz)   12/26/22 66.8 kg (147 lb 3.2 oz)      Vitals:    02/03/23 1408   BP: 180/90   Pulse: 93   Temp: 96.4 °F (35.8 °C)   SpO2: 100%     Temp Readings from Last 3 Encounters:   02/03/23 96.4 °F (35.8 °C)   01/03/23 98.2 °F (36.8 °C) (Infrared)   12/29/22 98.3 °F (36.8 °C) (Oral)     BP Readings from Last 3 Encounters:   02/03/23 180/90   01/03/23 168/82   12/29/22 142/73     Pulse Readings from Last 3 Encounters:   02/03/23 93   01/03/23 106   12/29/22 85     Body mass index is 29.9 kg/m².    Physical Exam  Constitutional:       Appearance: She is well-developed.   Neck:      Vascular: No carotid bruit.   Cardiovascular:      Rate and Rhythm: Normal rate and regular rhythm.      Heart sounds: Normal heart sounds.   Pulmonary:      Effort: Pulmonary effort is normal.      Breath sounds: Normal breath sounds.   Skin:     General: Skin is warm and dry.   Neurological:      Mental Status: She is alert.   Psychiatric:          Behavior: Behavior normal.         Assessment & Plan   Diagnoses and all orders for this visit:    1. Essential hypertension (Primary)    Other orders  -     lisinopril (PRINIVIL,ZESTRIL) 40 MG tablet; Take 1 tablet by mouth Daily.  Dispense: 30 tablet; Refill: 1  -     amLODIPine (NORVASC) 5 MG tablet; Take 1 tablet by mouth Daily.  Dispense: 30 tablet; Refill: 1        1.  Hypertension-uncontrolled- I resent amlodipine and lisinopril to pharmacy today.  Patient will call if she cannot get her medications from her pharmacy.  Follow-up in 10 days.  Patient will bring her medications to appointment, except of insulin.

## 2023-02-06 NOTE — TELEPHONE ENCOUNTER
Caller: Avila Antonietta M    Relationship: Self    Best call back number: [phone]    Requested Prescriptions:   Requested Prescriptions     Pending Prescriptions Disp Refills   • amLODIPine (NORVASC) 5 MG tablet 30 tablet 1     Sig: Take 1 tablet by mouth Daily.        Pharmacy where request should be sent: [facility] [id number] [id number] [facility] [place] AT [facility] [phone] [facility] [phone] FX     Additional details provided by patient: COMPLETELY OUT    Does the patient have less than a 3 day supply:  [x] Yes  [] No    Would you like a call back once the refill request has been completed: [x] Yes [] No    If the office needs to give you a call back, can they leave a voicemail: [x] Yes [] No    Yaritza Estrada Rep   02/06/23 13:57 EST

## 2023-02-07 RX ORDER — AMLODIPINE BESYLATE 5 MG/1
5 TABLET ORAL DAILY
Qty: 30 TABLET | Refills: 1
Start: 2023-02-07

## 2023-02-15 ENCOUNTER — OFFICE VISIT (OUTPATIENT)
Dept: INTERNAL MEDICINE | Facility: CLINIC | Age: 83
End: 2023-02-15
Payer: MEDICARE

## 2023-02-15 VITALS
HEART RATE: 101 BPM | HEIGHT: 58 IN | BODY MASS INDEX: 30.23 KG/M2 | OXYGEN SATURATION: 97 % | SYSTOLIC BLOOD PRESSURE: 180 MMHG | TEMPERATURE: 97 F | DIASTOLIC BLOOD PRESSURE: 70 MMHG | WEIGHT: 144 LBS

## 2023-02-15 DIAGNOSIS — J45.20 MILD INTERMITTENT ASTHMA WITHOUT COMPLICATION: ICD-10-CM

## 2023-02-15 DIAGNOSIS — I10 ESSENTIAL HYPERTENSION: Primary | ICD-10-CM

## 2023-02-15 LAB
CHOLEST SERPL-MCNC: 163 MG/DL (ref 0–200)
HDLC SERPL-MCNC: 75 MG/DL (ref 40–60)
LDLC SERPL CALC-MCNC: 71 MG/DL (ref 0–100)
LDLC/HDLC SERPL: 0.91 {RATIO}
TRIGL SERPL-MCNC: 97 MG/DL (ref 0–150)
VLDLC SERPL CALC-MCNC: 17 MG/DL (ref 5–40)

## 2023-02-15 PROCEDURE — 99214 OFFICE O/P EST MOD 30 MIN: CPT | Performed by: FAMILY MEDICINE

## 2023-02-15 RX ORDER — TRIAMCINOLONE ACETONIDE 55 UG/1
2 SPRAY, METERED NASAL DAILY PRN
Qty: 16.5 G | Refills: 5 | Status: SHIPPED | OUTPATIENT
Start: 2023-02-15

## 2023-02-15 RX ORDER — AMLODIPINE BESYLATE 5 MG/1
5 TABLET ORAL DAILY
Qty: 30 TABLET | Refills: 1 | Status: SHIPPED | OUTPATIENT
Start: 2023-02-15 | End: 2023-03-22 | Stop reason: SDUPTHER

## 2023-02-15 NOTE — PROGRESS NOTES
Subjective   Antonietta Gramajo is a 82 y.o. female.   Chief Complaint   Patient presents with   • Hypertension       History of Present Illness     1.  Hypertension- patient is on lisinopril 40 mg a day and off amlodipine 5 mg.  She did not get amlodipine from pharmacy.  She brought her medications to the office.  She takes Mucinex DM daily. She has no chest pain, no palpitations, no lightheadedness.  No change in symptoms from last office visit.  Creatinine 0.64, GFR 88.4. (12/2022).    2.  Asthma -she has intermittent asthma.  She uses albuterol as needed only.  She is requesting refill on albuterol.  In a last few weeks she noticed increased postnasal drainage which leads to cough.  She uses more albuterol.  She does not use any nasal sprays.  ACT at 15.    She had Prevnar 13 in 9/2017.  Pneumovax in 2007.    The following portions of the patient's history were reviewed and updated as appropriate: allergies, current medications, past medical history, past social history and problem list.    Review of Systems   HENT: Positive for postnasal drip and rhinorrhea.    Respiratory: Positive for cough. Negative for shortness of breath and wheezing.    Cardiovascular: Negative.          Objective   Wt Readings from Last 3 Encounters:   02/15/23 65.3 kg (144 lb)   02/03/23 64.9 kg (143 lb)   01/03/23 66.3 kg (146 lb 1.6 oz)      Vitals:    02/15/23 0941   BP: 180/70   Pulse: 101   Temp: 97 °F (36.1 °C)   SpO2: 97%     Temp Readings from Last 3 Encounters:   02/15/23 97 °F (36.1 °C)   02/03/23 96.4 °F (35.8 °C)   01/03/23 98.2 °F (36.8 °C) (Infrared)     BP Readings from Last 3 Encounters:   02/15/23 180/70   02/03/23 180/90   01/03/23 168/82     Pulse Readings from Last 3 Encounters:   02/15/23 101   02/03/23 93   01/03/23 106     Body mass index is 30.1 kg/m².    Physical Exam  Constitutional:       Appearance: She is well-developed.   Neck:      Thyroid: No thyromegaly.      Vascular: No carotid bruit.   Cardiovascular:       Rate and Rhythm: Normal rate and regular rhythm.      Heart sounds: Normal heart sounds.   Pulmonary:      Effort: Pulmonary effort is normal.      Breath sounds: Normal breath sounds. No wheezing, rhonchi or rales.   Skin:     General: Skin is warm and dry.   Neurological:      Mental Status: She is alert and oriented to person, place, and time.   Psychiatric:         Behavior: Behavior normal.         Assessment & Plan   Diagnoses and all orders for this visit:    1. Essential hypertension (Primary)  -     Lipid Panel With LDL / HDL Ratio    2. Mild intermittent asthma without complication    Other orders  -     Triamcinolone Acetonide (NASACORT) 55 MCG/ACT nasal inhaler; 2 sprays into the nostril(s) as directed by provider Daily As Needed (allergies).  Dispense: 16.5 g; Refill: 5        1.  Hypertension-uncontrolled.  She will stop Mucinex DM.  She can use plain Mucinex. She will continue lisinopril at 40 mg a day and she will  amlodipine 5 mg at the pharmacy today.  We are checking lipids today.  Follow-up in 1 month.    2.  Asthma-uncontrolled, likely due to uncontrolled allergic rhinitis.  We are adding Zyrtec daily and Nasacort daily.  She will use albuterol as needed.  Follow-up in 1 month.    She stopped taking Eliquis.  She is reminder why she was put on it and advised to take it as prescribed by her cardiologist.  She has no bleeding.

## 2023-03-22 ENCOUNTER — OFFICE VISIT (OUTPATIENT)
Dept: INTERNAL MEDICINE | Facility: CLINIC | Age: 83
End: 2023-03-22
Payer: MEDICARE

## 2023-03-22 VITALS
OXYGEN SATURATION: 98 % | BODY MASS INDEX: 29.81 KG/M2 | DIASTOLIC BLOOD PRESSURE: 80 MMHG | SYSTOLIC BLOOD PRESSURE: 140 MMHG | HEART RATE: 95 BPM | HEIGHT: 58 IN | WEIGHT: 142 LBS | TEMPERATURE: 97.6 F

## 2023-03-22 DIAGNOSIS — I10 ESSENTIAL HYPERTENSION: Primary | ICD-10-CM

## 2023-03-22 DIAGNOSIS — J45.20 MILD INTERMITTENT ASTHMA WITHOUT COMPLICATION: ICD-10-CM

## 2023-03-22 PROCEDURE — 3079F DIAST BP 80-89 MM HG: CPT | Performed by: FAMILY MEDICINE

## 2023-03-22 PROCEDURE — 99213 OFFICE O/P EST LOW 20 MIN: CPT | Performed by: FAMILY MEDICINE

## 2023-03-22 PROCEDURE — 3077F SYST BP >= 140 MM HG: CPT | Performed by: FAMILY MEDICINE

## 2023-03-22 PROCEDURE — 1160F RVW MEDS BY RX/DR IN RCRD: CPT | Performed by: FAMILY MEDICINE

## 2023-03-22 PROCEDURE — 1159F MED LIST DOCD IN RCRD: CPT | Performed by: FAMILY MEDICINE

## 2023-03-22 RX ORDER — AMLODIPINE BESYLATE 5 MG/1
5 TABLET ORAL DAILY
Qty: 90 TABLET | Refills: 1 | Status: SHIPPED | OUTPATIENT
Start: 2023-03-22

## 2023-03-22 RX ORDER — ALBUTEROL SULFATE 90 UG/1
1 AEROSOL, METERED RESPIRATORY (INHALATION) EVERY 6 HOURS PRN
Qty: 18 G | Refills: 0 | Status: SHIPPED | OUTPATIENT
Start: 2023-03-22

## 2023-03-22 RX ORDER — LISINOPRIL 40 MG/1
40 TABLET ORAL DAILY
Qty: 90 TABLET | Refills: 1 | Status: SHIPPED | OUTPATIENT
Start: 2023-03-22

## 2023-03-22 NOTE — PROGRESS NOTES
Subjective   Antonietta Gramajo is a 82 y.o. female.   Chief Complaint   Patient presents with   • Hypertension   • Asthma       History of Present Illness     1.  Hypertension- patient is on lisinopril 40 mg a day and amlodipine 5 mg.  She did not bring medications or medications list with her, but she says she takes both of them every day.  She discontinued Mucinex DM. She has no chest pain, no palpitations, no lightheadedness, no lower extremity edema.       2.  Asthma -she has intermittent asthma.  She uses albuterol as needed.  She uses it on average twice a week.  She is requesting refill on albuterol.   She used last dose this morning.  It helped.  She uses nasal sprays to help with postnasal drainage as needed only.    The following portions of the patient's history were reviewed and updated as appropriate: allergies, current medications, past medical history, past social history and problem list.    Review of Systems   Respiratory: Negative for shortness of breath.    Cardiovascular: Negative for chest pain, palpitations and leg swelling.   Neurological: Negative for light-headedness.         Objective   Wt Readings from Last 3 Encounters:   03/22/23 64.4 kg (142 lb)   02/15/23 65.3 kg (144 lb)   02/03/23 64.9 kg (143 lb)      Vitals:    03/22/23 1052   BP: 140/80   Pulse: 95   Temp: 97.6 °F (36.4 °C)   SpO2: 98%     Temp Readings from Last 3 Encounters:   03/22/23 97.6 °F (36.4 °C)   02/15/23 97 °F (36.1 °C)   02/03/23 96.4 °F (35.8 °C)     BP Readings from Last 3 Encounters:   03/22/23 140/80   02/15/23 180/70   02/03/23 180/90     Pulse Readings from Last 3 Encounters:   03/22/23 95   02/15/23 101   02/03/23 93     Body mass index is 29.69 kg/m².    Physical Exam  Constitutional:       Appearance: She is well-developed.   Neck:      Thyroid: No thyromegaly.      Vascular: No carotid bruit.   Cardiovascular:      Rate and Rhythm: Normal rate and regular rhythm.      Heart sounds: Normal heart sounds.    Pulmonary:      Effort: Pulmonary effort is normal. No respiratory distress.      Breath sounds: Normal breath sounds. No wheezing or rhonchi.   Skin:     General: Skin is warm and dry.   Neurological:      Mental Status: She is alert.   Psychiatric:         Behavior: Behavior normal.         Assessment & Plan   Diagnoses and all orders for this visit:    1. Essential hypertension (Primary)    2. Mild intermittent asthma without complication    Other orders  -     lisinopril (PRINIVIL,ZESTRIL) 40 MG tablet; Take 1 tablet by mouth Daily.  Dispense: 90 tablet; Refill: 1  -     amLODIPine (NORVASC) 5 MG tablet; Take 1 tablet by mouth Daily.  Dispense: 90 tablet; Refill: 1  -     albuterol sulfate HFA (ProAir HFA) 108 (90 Base) MCG/ACT inhaler; Inhale 1 puff Every 6 (Six) Hours As Needed for Wheezing.  Dispense: 18 g; Refill: 0        1.  Hypertension-continue current treatment.  Information on DASH diet provided.  Follow-up in 6 months, or sooner if any problems.  2.  Asthma-continue albuterol as needed.  Follow-up in 6 to 12 months.            BMI is >= 25 and <30. (Overweight) The following options were offered after discussion;: weight loss educational material (shared in after visit summary)

## 2023-04-04 ENCOUNTER — OFFICE VISIT (OUTPATIENT)
Dept: ENDOCRINOLOGY | Age: 83
End: 2023-04-04
Payer: MEDICARE

## 2023-04-04 VITALS
DIASTOLIC BLOOD PRESSURE: 82 MMHG | BODY MASS INDEX: 29.72 KG/M2 | TEMPERATURE: 96 F | HEIGHT: 58 IN | HEART RATE: 103 BPM | SYSTOLIC BLOOD PRESSURE: 126 MMHG | WEIGHT: 141.6 LBS | OXYGEN SATURATION: 99 %

## 2023-04-04 DIAGNOSIS — E11.65 TYPE 2 DIABETES MELLITUS WITH HYPERGLYCEMIA, WITH LONG-TERM CURRENT USE OF INSULIN: ICD-10-CM

## 2023-04-04 DIAGNOSIS — Z79.4 TYPE 2 DIABETES MELLITUS WITH HYPERGLYCEMIA, WITH LONG-TERM CURRENT USE OF INSULIN: ICD-10-CM

## 2023-04-04 PROCEDURE — 3074F SYST BP LT 130 MM HG: CPT | Performed by: INTERNAL MEDICINE

## 2023-04-04 PROCEDURE — 1160F RVW MEDS BY RX/DR IN RCRD: CPT | Performed by: INTERNAL MEDICINE

## 2023-04-04 PROCEDURE — 3079F DIAST BP 80-89 MM HG: CPT | Performed by: INTERNAL MEDICINE

## 2023-04-04 PROCEDURE — 99214 OFFICE O/P EST MOD 30 MIN: CPT | Performed by: INTERNAL MEDICINE

## 2023-04-04 PROCEDURE — 1159F MED LIST DOCD IN RCRD: CPT | Performed by: INTERNAL MEDICINE

## 2023-04-04 RX ORDER — INSULIN DEGLUDEC 200 U/ML
24 INJECTION, SOLUTION SUBCUTANEOUS DAILY
Qty: 15 ML | Refills: 5 | Status: SHIPPED | OUTPATIENT
Start: 2023-04-04

## 2023-04-04 RX ORDER — FLURBIPROFEN SODIUM 0.3 MG/ML
SOLUTION/ DROPS OPHTHALMIC
Qty: 150 EACH | Refills: 6 | Status: SHIPPED | OUTPATIENT
Start: 2023-04-04

## 2023-04-04 NOTE — PROGRESS NOTES
Chief complaint:  T2DM    HPI:   - 82 year old female here for management of diabetes mellitus type 2  - Has had diabetes for 6-7 years  - Complications include peripheral neuropathy  - Is currently taking metformin (she only takes 500 mg bid sometimes due to diarrhea) and Tresiba 24 units daily  - She was taking Humalog with meals but stopped it because she thought it caused dark spots in her vision  - She only check her BG once daily but has noticed some low BG levels  - She states she eats inconsistently because she does not have much of an appetite  - She is getting an eye exam in 2 weeks      The following portions of the patient's history were reviewed and updated as appropriate: allergies, current medications, past family history, past medical history, past social history, past surgical history and problem list.    Review of Systems   Constitutional: Negative.        Objective     Vitals:    04/04/23 1149   BP: 126/82   Pulse: 103   Temp: 96 °F (35.6 °C)   SpO2: 99%        Physical Exam  Constitutional:       Appearance: Normal appearance.   HENT:      Head: Normocephalic and atraumatic.   Eyes:      General: No scleral icterus.     Conjunctiva/sclera: Conjunctivae normal.   Pulmonary:      Effort: Pulmonary effort is normal. No respiratory distress.   Neurological:      Mental Status: She is alert.   Psychiatric:         Mood and Affect: Mood normal.         Behavior: Behavior normal.         Thought Content: Thought content normal.         Judgment: Judgment normal.         Assessment & Plan   1. Type 2 DM, uncontrolled due to hyperglycemia, complicated by peripheral neuropathy  - She is not at goal for her glycemic control  - Cont. metformin (she only takes 500 mg bid sometimes due to diarrhea) and Tresiba 24 units daily  - Restart Humalog 6 units qac plus 1 per 50 over 150 CF  - Check BG 3 times daily    - Return to clinic in 3 months

## 2023-04-05 ENCOUNTER — PATIENT ROUNDING (BHMG ONLY) (OUTPATIENT)
Dept: ENDOCRINOLOGY | Age: 83
End: 2023-04-05
Payer: MEDICARE

## 2023-10-27 RX ORDER — AMLODIPINE BESYLATE 5 MG/1
5 TABLET ORAL DAILY
Qty: 90 TABLET | Refills: 1 | Status: SHIPPED | OUTPATIENT
Start: 2023-10-27

## 2023-11-10 DIAGNOSIS — I10 ESSENTIAL HYPERTENSION: Primary | ICD-10-CM

## 2023-11-17 ENCOUNTER — OFFICE VISIT (OUTPATIENT)
Dept: INTERNAL MEDICINE | Facility: CLINIC | Age: 83
End: 2023-11-17
Payer: MEDICARE

## 2023-11-17 VITALS
BODY MASS INDEX: 30.14 KG/M2 | TEMPERATURE: 97.5 F | WEIGHT: 143.6 LBS | HEIGHT: 58 IN | RESPIRATION RATE: 18 BRPM | SYSTOLIC BLOOD PRESSURE: 146 MMHG | OXYGEN SATURATION: 100 % | DIASTOLIC BLOOD PRESSURE: 80 MMHG | HEART RATE: 96 BPM

## 2023-11-17 DIAGNOSIS — Z00.00 MEDICARE ANNUAL WELLNESS VISIT, SUBSEQUENT: Primary | ICD-10-CM

## 2023-11-17 DIAGNOSIS — I10 ESSENTIAL HYPERTENSION: ICD-10-CM

## 2023-11-17 LAB
ALBUMIN SERPL-MCNC: 4.6 G/DL (ref 3.5–5.2)
ALBUMIN/GLOB SERPL: 1.7 G/DL
ALP SERPL-CCNC: 131 U/L (ref 39–117)
ALT SERPL-CCNC: 16 U/L (ref 1–33)
AST SERPL-CCNC: 20 U/L (ref 1–32)
BILIRUB SERPL-MCNC: 0.4 MG/DL (ref 0–1.2)
BUN SERPL-MCNC: 18 MG/DL (ref 8–23)
BUN/CREAT SERPL: 20.5 (ref 7–25)
CALCIUM SERPL-MCNC: 9.9 MG/DL (ref 8.6–10.5)
CHLORIDE SERPL-SCNC: 104 MMOL/L (ref 98–107)
CO2 SERPL-SCNC: 28.2 MMOL/L (ref 22–29)
CREAT SERPL-MCNC: 0.88 MG/DL (ref 0.57–1)
EGFRCR SERPLBLD CKD-EPI 2021: 65.3 ML/MIN/1.73
GLOBULIN SER CALC-MCNC: 2.7 GM/DL
GLUCOSE SERPL-MCNC: 140 MG/DL (ref 65–99)
POTASSIUM SERPL-SCNC: 4 MMOL/L (ref 3.5–5.2)
PROT SERPL-MCNC: 7.3 G/DL (ref 6–8.5)
SODIUM SERPL-SCNC: 142 MMOL/L (ref 136–145)

## 2023-11-17 NOTE — PROGRESS NOTES
The ABCs of the Annual Wellness Visit  Subsequent Medicare Wellness Visit    Subjective      Antonietta Gramajo is a 83 y.o. female who presents for a Subsequent Medicare Wellness Visit.    The following portions of the patient's history were reviewed and   updated as appropriate: allergies, current medications, past family history, past medical history, past social history, past surgical history, and problem list.    Compared to one year ago, the patient feels her physical   health is the same.    Compared to one year ago, the patient feels her mental   health is the same.    Recent Hospitalizations:  This patient has had a [facility] admission record on file within the last 365 days.    Current Medical Providers:  Patient Care Team:  Gemini Catherine MD as PCP - General    Outpatient Medications Prior to Visit   Medication Sig Dispense Refill    albuterol sulfate HFA (ProAir HFA) 108 (90 Base) MCG/ACT inhaler Inhale 1 puff Every 6 (Six) Hours As Needed for Wheezing. 18 g 0    amLODIPine (NORVASC) 5 MG tablet TAKE ONE TABLET BY MOUTH DAILY 90 tablet 1    apixaban (ELIQUIS) 5 MG tablet tablet Take 1 tablet by mouth Every 12 (Twelve) Hours. Indications: Atrial Fibrillation 60 tablet 1    Aspirin 81 MG capsule Take 81 mg by mouth Daily.      B Complex Vitamins (VITAMIN-B COMPLEX PO) Take  by mouth.      esomeprazole (nexIUM) 20 MG capsule Take 1 capsule by mouth Every Morning Before Breakfast.      glucose blood test strip Check blood glucose 3-4 times dialy 100 each 4    Insulin Lispro, 1 Unit Dial, (HumaLOG KwikPen) 100 UNIT/ML solution pen-injector Use as directed for meal & correction coverage up to 50 units a day. 6 units plus scale based on glucose of 1 units :30 mg/dl glucose over 140 mg/dl glucose 45 mL 2    Insulin Pen Needle (B-D UF III MINI PEN NEEDLES) 31G X 5 MM misc USE ONCE DAILY AS DIRECTED 4 x daily. Dx. E11.65 150 each 6    lisinopril (PRINIVIL,ZESTRIL) 40 MG tablet Take 1 tablet by mouth Daily.  90 tablet 1    metFORMIN (GLUCOPHAGE) 1000 MG tablet Take 1 tablet by mouth Daily With Breakfast. (Patient taking differently: Take 1 tablet by mouth Daily With Breakfast. Not taking daily) 90 tablet 1    Omeprazole 20 MG tablet delayed-release Take  by mouth.      ONETOUCH DELICA LANCETS 33G misc Check blood glucose 3 times daily DX CODE: e11.65 100 each 5    Tresiba FlexTouch 200 UNIT/ML solution pen-injector pen injection Inject 24 Units under the skin into the appropriate area as directed Daily. 15 mL 5    Triamcinolone Acetonide (NASACORT) 55 MCG/ACT nasal inhaler 2 sprays into the nostril(s) as directed by provider Daily As Needed (allergies). 16.5 g 5     No facility-administered medications prior to visit.       No opioid medication identified on active medication list. I have reviewed chart for other potential  high risk medication/s and harmful drug interactions in the elderly.        Aspirin is on active medication list. Aspirin use is indicated based on review of current medical condition/s. Pros and cons of this therapy have been discussed today. Benefits of this medication outweigh potential harm.  Patient has been encouraged to continue taking this medication.  .      Patient Active Problem List   Diagnosis    Allergic rhinitis    GERD without esophagitis    Essential hypertension    Osteoarthritis of knee    Type 2 diabetes mellitus with hyperglycemia, with long-term current use of insulin    Open-angle glaucoma    Nonproliferative diabetic retinopathy of both eyes    Hyperlipidemia    Long-term insulin use    Mild intermittent asthma without complication    Osteoarthritis of multiple joints    Vitamin D deficiency    Diabetic autonomic neuropathy associated with type 2 diabetes mellitus    Neutropenia    Paroxysmal atrial fibrillation    Pancytopenia    Dysfunctional grieving    Acute lower GI bleeding    Stage 3a chronic kidney disease    Acute blood loss anemia    Abnormal CT of the abdomen     "Left hip pain    Pain in both lower extremities     Advance Care Planning   Advance Care Planning     Advance Directive is not on file.  ACP discussion was held with the patient during this visit. Patient has an advance directive (not in EMR), copy requested.     Objective    Vitals:    23 0959   BP: 146/80   Pulse: 96   Resp: 18   Temp: 97.5 °F (36.4 °C)   SpO2: 100%   Weight: 65.1 kg (143 lb 9.6 oz)   Height: 147.3 cm (57.99\")   PainSc:   7   PainLoc: Back     Estimated body mass index is 30.02 kg/m² as calculated from the following:    Height as of this encounter: 147.3 cm (57.99\").    Weight as of this encounter: 65.1 kg (143 lb 9.6 oz).           Does the patient have evidence of cognitive impairment?   No            HEALTH RISK ASSESSMENT    Smoking Status:  Social History     Tobacco Use   Smoking Status Never   Smokeless Tobacco Never     Alcohol Consumption:  Social History     Substance and Sexual Activity   Alcohol Use No     Fall Risk Screen:    STEADI Fall Risk Assessment was completed, and patient is at LOW risk for falls.Assessment completed on:2023    Depression Screenin/17/2023    10:10 AM   PHQ-2/PHQ-9 Depression Screening   Little Interest or Pleasure in Doing Things 0-->not at all   Feeling Down, Depressed or Hopeless 0-->not at all   PHQ-9: Brief Depression Severity Measure Score 0       Health Habits and Functional and Cognitive Screenin/17/2023    10:05 AM   Functional & Cognitive Status   Do you have difficulty preparing food and eating? No   Do you have difficulty bathing yourself, getting dressed or grooming yourself? No   Do you have difficulty using the toilet? No   Do you have difficulty moving around from place to place? Yes   Do you have trouble with steps or getting out of a bed or a chair? No   Current Diet Limited Junk Food   Dental Exam Up to date   Eye Exam Up to date   Exercise (times per week) 2 times per week   Current Exercises Include Walking "   Do you need help using the phone?  No   Are you deaf or do you have serious difficulty hearing?  Yes   Do you need help to go to places out of walking distance? No   Do you need help shopping? No   Do you need help preparing meals?  No   Do you need help with housework?  Yes   Do you need help with laundry? No   Do you need help taking your medications? No   Do you need help managing money? No   Do you ever drive or ride in a car without wearing a seat belt? Yes   Have you felt unusual stress, anger or loneliness in the last month? No   Who do you live with? Alone   Have you been bothered in the last four weeks by sexual problems? No   Do you have difficulty concentrating, remembering or making decisions? No       Age-appropriate Screening Schedule:  Refer to the list below for future screening recommendations based on patient's age, sex and/or medical conditions. Orders for these recommended tests are listed in the plan section. The patient has been provided with a written plan.    Health Maintenance   Topic Date Due    DXA SCAN  05/09/2016    ANNUAL WELLNESS VISIT  Never done    Pneumococcal Vaccine 65+ (2 - PPSV23 or PCV20) 10/31/2017    URINE MICROALBUMIN  01/07/2022    HEMOGLOBIN A1C  07/11/2022    DIABETIC EYE EXAM  09/22/2022    INFLUENZA VACCINE  08/01/2023    COVID-19 Vaccine (3 - 2023-24 season) 09/01/2023    ZOSTER VACCINE (2 of 2) 01/06/2025 (Originally 10/31/2017)    LIPID PANEL  02/15/2024    BMI FOLLOWUP  03/22/2024    TDAP/TD VACCINES  Discontinued           -       CMS Preventative Services Quick Reference  Risk Factors Identified During Encounter:    Immunizations Discussed/Encouraged: Tdap, Influenza, Prevnar 20 (Pneumococcal 20-valent conjugate), Shingrix, and COVID19    The above risks/problems have been discussed with the patient.  Pertinent information has been shared with the patient in the After Visit Summary.    Information on healthy heart diet provided.  Information on sit to stand  exercise and exercise while sitting provided.  She walks with a cane.  She takes care of things at home.  She lives by herself.  She gets help with cleaning only.  She is reminded to wear seatbelts always.  She has hearing aids at home, but not always wears them.  We discussed recommendations for vaccinations including COVID-vaccine, flu vaccine, pneumonia, Shingrix and tetanus vaccine.  She will bring living will to next office visit.    Diagnoses and all orders for this visit:    1. Medicare annual wellness visit, subsequent (Primary)    2. Essential hypertension        Follow Up:   Next Medicare Wellness visit to be scheduled in 1 year.      An After Visit Summary and PPPS were made available to the patient.

## 2023-11-17 NOTE — PATIENT INSTRUCTIONS
Medicare Wellness  Personal Prevention Plan of Service     Date of Office Visit:    Encounter Provider:  Gemini Catherine MD  Place of Service:  [facility] INTERNAL MEDICINE  Patient Name: Antonietta Gramajo  :  1940    As part of the Medicare Wellness portion of your visit today, we are providing you with this personalized preventive plan of services (PPPS). This plan is based upon recommendations of the [place] Preventive Services Task Force (USPSTF) and the Advisory Committee on Immunization Practices (ACIP).    This lists the preventive care services that should be considered, and provides dates of when you are due. Items listed as completed are up-to-date and do not require any further intervention.    Health Maintenance   Topic Date Due    DXA SCAN  2016    ANNUAL WELLNESS VISIT  Never done    Pneumococcal Vaccine 65+ (2 - PPSV23 or PCV20) 10/31/2017    URINE MICROALBUMIN  2022    HEMOGLOBIN A1C  2022    DIABETIC EYE EXAM  2022    INFLUENZA VACCINE  2023    COVID-19 Vaccine (3 - -24 season) 2023    ZOSTER VACCINE (2 of 2) 2025 (Originally 10/31/2017)    LIPID PANEL  02/15/2024    BMI FOLLOWUP  2024    TDAP/TD VACCINES  Discontinued       No orders of the defined types were placed in this encounter.      Return in about 6 months (around 2024).

## 2024-04-17 NOTE — PROGRESS NOTES
Subjective   Antonietta Gramajo is a 83 y.o. female.   Chief Complaint   Patient presents with    Medicare Wellness-subsequent     Patient is here for annual wellness visit, with labs done prior.     Hypertension        Hypertension - patient is on lisinopril 40 mg a day and amlodipine 5 mg.  She did not bring medications or medications list with her, but she says she takes both of them every day.  She has no chest pain, no palpitations, no lightheadedness, no lower extremity edema.  No shortness of breath.  She does not exercise regularly, but she moves around the house and take care of things at home.  She follows up with cardiologist Dr. Page, was not seen in the last year.    Review of Systems   Respiratory: Negative.     Cardiovascular: Negative.    Neurological:  Negative for light-headedness.         Objective   Wt Readings from Last 3 Encounters:   11/17/23 65.1 kg (143 lb 9.6 oz)   04/04/23 64.2 kg (141 lb 9.6 oz)   03/22/23 64.4 kg (142 lb)      Vitals:    11/17/23 0959   BP: 146/80   Pulse: 96   Resp: 18   Temp: 97.5 °F (36.4 °C)   SpO2: 100%     Temp Readings from Last 3 Encounters:   11/17/23 97.5 °F (36.4 °C)   04/04/23 96 °F (35.6 °C) (Temporal)   03/22/23 97.6 °F (36.4 °C)     BP Readings from Last 3 Encounters:   11/17/23 146/80   04/04/23 126/82   03/22/23 140/80     Pulse Readings from Last 3 Encounters:   11/17/23 96   04/04/23 103   03/22/23 95     Body mass index is 30.02 kg/m².    Physical Exam  Constitutional:       Appearance: She is well-developed.   Neck:      Vascular: No carotid bruit.   Cardiovascular:      Rate and Rhythm: Normal rate and regular rhythm.      Heart sounds: Normal heart sounds.   Pulmonary:      Effort: Pulmonary effort is normal.      Breath sounds: Normal breath sounds.   Skin:     General: Skin is warm and dry.   Neurological:      Mental Status: She is alert and oriented to person, place, and time.   Psychiatric:         Behavior: Behavior normal.         Assessment &  Plan   Diagnoses and all orders for this visit:    1. Medicare annual wellness visit, subsequent (Primary)    2. Essential hypertension        Hypertension-continue current treatment.  She is reminded to schedule follow-up with his cardiologist Dr. Page.  Labs today.  Follow-up in 6 months.                 How Severe Is Your Condition?: mild

## 2024-05-01 RX ORDER — AMLODIPINE BESYLATE 5 MG/1
5 TABLET ORAL DAILY
Qty: 90 TABLET | Refills: 1 | Status: SHIPPED | OUTPATIENT
Start: 2024-05-01

## 2024-05-08 DIAGNOSIS — E78.2 MIXED HYPERLIPIDEMIA: Primary | ICD-10-CM

## 2024-05-08 DIAGNOSIS — Z79.4 TYPE 2 DIABETES MELLITUS WITH HYPERGLYCEMIA, WITH LONG-TERM CURRENT USE OF INSULIN: ICD-10-CM

## 2024-05-08 DIAGNOSIS — E11.65 TYPE 2 DIABETES MELLITUS WITH HYPERGLYCEMIA, WITH LONG-TERM CURRENT USE OF INSULIN: ICD-10-CM

## 2024-06-25 LAB
ALBUMIN SERPL-MCNC: 4.2 G/DL (ref 3.7–4.7)
ALP SERPL-CCNC: 131 IU/L (ref 44–121)
ALT SERPL-CCNC: 15 IU/L (ref 0–32)
AST SERPL-CCNC: 17 IU/L (ref 0–40)
BILIRUB SERPL-MCNC: 0.5 MG/DL (ref 0–1.2)
BUN SERPL-MCNC: 18 MG/DL (ref 8–27)
BUN/CREAT SERPL: 20 (ref 12–28)
CALCIUM SERPL-MCNC: 9.7 MG/DL (ref 8.7–10.3)
CHLORIDE SERPL-SCNC: 101 MMOL/L (ref 96–106)
CHOLEST SERPL-MCNC: 185 MG/DL (ref 100–199)
CO2 SERPL-SCNC: 27 MMOL/L (ref 20–29)
CREAT SERPL-MCNC: 0.89 MG/DL (ref 0.57–1)
EGFRCR SERPLBLD CKD-EPI 2021: 64 ML/MIN/1.73
GLOBULIN SER CALC-MCNC: 2.7 G/DL (ref 1.5–4.5)
GLUCOSE SERPL-MCNC: 132 MG/DL (ref 70–99)
HBA1C MFR BLD: 10.7 % (ref 4.8–5.6)
HDLC SERPL-MCNC: 77 MG/DL
LDLC SERPL CALC-MCNC: 86 MG/DL (ref 0–99)
LDLC/HDLC SERPL: 1.1 RATIO (ref 0–3.2)
POTASSIUM SERPL-SCNC: 4.1 MMOL/L (ref 3.5–5.2)
PROT SERPL-MCNC: 6.9 G/DL (ref 6–8.5)
SODIUM SERPL-SCNC: 140 MMOL/L (ref 134–144)
TRIGL SERPL-MCNC: 131 MG/DL (ref 0–149)
VLDLC SERPL CALC-MCNC: 22 MG/DL (ref 5–40)

## 2024-07-01 ENCOUNTER — OFFICE VISIT (OUTPATIENT)
Dept: INTERNAL MEDICINE | Facility: CLINIC | Age: 84
End: 2024-07-01
Payer: MEDICARE

## 2024-07-01 VITALS
SYSTOLIC BLOOD PRESSURE: 154 MMHG | HEART RATE: 92 BPM | BODY MASS INDEX: 30.44 KG/M2 | WEIGHT: 145 LBS | HEIGHT: 58 IN | TEMPERATURE: 98 F | DIASTOLIC BLOOD PRESSURE: 90 MMHG | OXYGEN SATURATION: 98 %

## 2024-07-01 DIAGNOSIS — E78.2 MIXED HYPERLIPIDEMIA: ICD-10-CM

## 2024-07-01 DIAGNOSIS — I10 ESSENTIAL HYPERTENSION: Primary | ICD-10-CM

## 2024-07-01 PROCEDURE — 99214 OFFICE O/P EST MOD 30 MIN: CPT | Performed by: FAMILY MEDICINE

## 2024-07-01 PROCEDURE — 3080F DIAST BP >= 90 MM HG: CPT | Performed by: FAMILY MEDICINE

## 2024-07-01 PROCEDURE — 3077F SYST BP >= 140 MM HG: CPT | Performed by: FAMILY MEDICINE

## 2024-07-01 PROCEDURE — 1125F AMNT PAIN NOTED PAIN PRSNT: CPT | Performed by: FAMILY MEDICINE

## 2024-07-01 NOTE — PROGRESS NOTES
Subjective   Antonietta Gramajo is a 84 y.o. female.   Chief Complaint   Patient presents with    Hypertension       History of Present Illness     Hypertension - patient is on lisinopril 40 mg a day and off amlodipine 5 mg.  She did not bring medications or medications list with her, but she says she takes lisinopril every day, but she discontinued amlodipine a few months ago because it was making her dizzy.  She says that dizziness resolved after she stopped it.  She has no chest pain, no palpitations, no lightheadedness, no lower extremity edema.    Creatinine 0.89, GFR 64.  LDL 86, HDL 77.     Diabetes type 2-she follows up with endocrinologist.  , A1c is 10.7.    Review of Systems   Respiratory: Negative.     Cardiovascular: Negative.          Objective   Wt Readings from Last 3 Encounters:   07/01/24 65.8 kg (145 lb)   11/17/23 65.1 kg (143 lb 9.6 oz)   04/04/23 64.2 kg (141 lb 9.6 oz)      Vitals:    07/01/24 1359   BP: 154/90   Pulse: 92   Temp: 98 °F (36.7 °C)   SpO2: 98%     Temp Readings from Last 3 Encounters:   07/01/24 98 °F (36.7 °C)   11/17/23 97.5 °F (36.4 °C)   04/04/23 96 °F (35.6 °C) (Temporal)     BP Readings from Last 3 Encounters:   07/01/24 154/90   11/17/23 146/80   04/04/23 126/82     Pulse Readings from Last 3 Encounters:   07/01/24 92   11/17/23 96   04/04/23 103     Body mass index is 30.31 kg/m².    Physical Exam  Constitutional:       Appearance: She is well-developed.   Neck:      Thyroid: No thyromegaly.      Vascular: No carotid bruit.   Cardiovascular:      Rate and Rhythm: Normal rate and regular rhythm.      Heart sounds: Normal heart sounds.   Pulmonary:      Effort: Pulmonary effort is  normal.      Breath sounds: Normal breath sounds.   Skin:     General: Skin is warm and dry.   Neurological:      Mental Status: She is alert.         Assessment & Plan   Diagnoses and all orders for this visit:    1. Essential hypertension (Primary)  -     Basic Metabolic Panel; Future    2. Mixed hyperlipidemia  -     Thyroid Cascade Profile; Future  -     Lipid Panel With LDL / HDL Ratio; Future        Hypertension-uncontrolled.  She will continue lisinopril at current dose.  She will add half tablet of amlodipine 5 mg and if tolerated will increase it to half tablet twice a day.  Follow-up in 6 months, or sooner if problems.    Diabetes-managed by endocrinologist.  I advised patient to schedule follow-up with her endocrinologist.  She says she is due and she is going to schedule it.

## 2024-07-25 DIAGNOSIS — Z79.4 TYPE 2 DIABETES MELLITUS WITH HYPERGLYCEMIA, WITH LONG-TERM CURRENT USE OF INSULIN: ICD-10-CM

## 2024-07-25 DIAGNOSIS — E11.65 TYPE 2 DIABETES MELLITUS WITH HYPERGLYCEMIA, WITH LONG-TERM CURRENT USE OF INSULIN: ICD-10-CM

## 2024-07-26 RX ORDER — INSULIN DEGLUDEC 200 U/ML
INJECTION, SOLUTION SUBCUTANEOUS
Qty: 9 ML | OUTPATIENT
Start: 2024-07-26

## 2024-07-26 NOTE — TELEPHONE ENCOUNTER
Rx Refill Note  Requested Prescriptions     Pending Prescriptions Disp Refills    Tresiba FlexTouch 200 UNIT/ML solution pen-injector pen injection [Pharmacy Med Name: TRESIBA FLEXTOUCH 200 UNIT/ML] 9 mL      Sig: INJECT 24 UNITS UNDER THE SKIN TO THE APPROPRATE AREA AS DIRECTED DAILY      Last office visit with prescribing clinician: 4/4/2023   Last telemedicine visit with prescribing clinician: Visit date not found   Next office visit with prescribing clinician: Visit date not found                         Would you like a call back once the refill request has been completed: [] Yes [] No    If the office needs to give you a call back, can they leave a voicemail: [] Yes [] No    Salima Mccain MA  07/26/24, 07:39 EDT

## 2024-09-09 RX ORDER — LISINOPRIL 40 MG/1
40 TABLET ORAL DAILY
Qty: 90 TABLET | Refills: 1 | Status: SHIPPED | OUTPATIENT
Start: 2024-09-09

## 2024-09-19 DIAGNOSIS — E11.65 TYPE 2 DIABETES MELLITUS WITH HYPERGLYCEMIA, WITH LONG-TERM CURRENT USE OF INSULIN: ICD-10-CM

## 2024-09-19 DIAGNOSIS — Z79.4 TYPE 2 DIABETES MELLITUS WITH HYPERGLYCEMIA, WITH LONG-TERM CURRENT USE OF INSULIN: ICD-10-CM

## 2024-09-19 RX ORDER — INSULIN DEGLUDEC 200 U/ML
24 INJECTION, SOLUTION SUBCUTANEOUS DAILY
Qty: 15 ML | Refills: 5 | OUTPATIENT
Start: 2024-09-19

## 2024-09-27 ENCOUNTER — OFFICE VISIT (OUTPATIENT)
Dept: ENDOCRINOLOGY | Age: 84
End: 2024-09-27
Payer: MEDICARE

## 2024-09-27 VITALS
WEIGHT: 140.8 LBS | DIASTOLIC BLOOD PRESSURE: 94 MMHG | HEART RATE: 109 BPM | SYSTOLIC BLOOD PRESSURE: 162 MMHG | BODY MASS INDEX: 29.56 KG/M2 | OXYGEN SATURATION: 99 % | HEIGHT: 58 IN

## 2024-09-27 DIAGNOSIS — E11.9 TYPE 2 DIABETES MELLITUS WITHOUT COMPLICATION, UNSPECIFIED WHETHER LONG TERM INSULIN USE: ICD-10-CM

## 2024-09-27 DIAGNOSIS — E11.42 DIABETIC POLYNEUROPATHY ASSOCIATED WITH TYPE 2 DIABETES MELLITUS: Primary | ICD-10-CM

## 2024-09-27 DIAGNOSIS — E11.65 TYPE 2 DIABETES MELLITUS WITH HYPERGLYCEMIA, WITH LONG-TERM CURRENT USE OF INSULIN: ICD-10-CM

## 2024-09-27 DIAGNOSIS — Z79.4 TYPE 2 DIABETES MELLITUS WITH HYPERGLYCEMIA, WITH LONG-TERM CURRENT USE OF INSULIN: ICD-10-CM

## 2024-09-27 RX ORDER — INSULIN LISPRO 100 [IU]/ML
INJECTION, SOLUTION INTRAVENOUS; SUBCUTANEOUS
Qty: 45 ML | Refills: 2 | Status: SHIPPED | OUTPATIENT
Start: 2024-09-27

## 2024-09-27 RX ORDER — FLURBIPROFEN SODIUM 0.3 MG/ML
SOLUTION/ DROPS OPHTHALMIC
Qty: 400 EACH | Refills: 1 | Status: SHIPPED | OUTPATIENT
Start: 2024-09-27

## 2024-09-27 RX ORDER — INSULIN DEGLUDEC 200 U/ML
20 INJECTION, SOLUTION SUBCUTANEOUS DAILY
Qty: 15 ML | Refills: 1 | Status: SHIPPED | OUTPATIENT
Start: 2024-09-27

## 2025-05-30 DIAGNOSIS — E11.65 TYPE 2 DIABETES MELLITUS WITH HYPERGLYCEMIA, WITH LONG-TERM CURRENT USE OF INSULIN: ICD-10-CM

## 2025-05-30 DIAGNOSIS — Z79.4 TYPE 2 DIABETES MELLITUS WITH HYPERGLYCEMIA, WITH LONG-TERM CURRENT USE OF INSULIN: ICD-10-CM

## 2025-05-30 NOTE — TELEPHONE ENCOUNTER
Caller: Gramajo Antonietta M    Relationship: Self    Best call back number: [phone]    Requested Prescriptions:  Tresiba FlexTouch 200 UNIT/ML solution pen-injector pen injection  Requested Prescriptions      No prescriptions requested or ordered in this encounter        Pharmacy where request should be sent:  [facility] [id number] [place], [place] - [address] [facility] [phone]  - [phone]   [address] [facility], [facility] [address]  Phone: [phone]  Fax: [phone]       Last office visit with prescribing clinician: 9/27/2024   Last telemedicine visit with prescribing clinician: Visit date not found   Next office visit with prescribing clinician: 7/23/2025     Additional details provided by patient: PT HAS AN UPCOMING APPT ON  7/23 WITH DR. RAJPUT    Does the patient have less than a 3 day supply:  [x] Yes  [] No    Would you like a call back once the refill request has been completed: [x] Yes [] No    If the office needs to give you a call back, can they leave a voicemail: [x] Yes [] No    Yaritza Baer Rep   05/30/25 16:01 EDT

## 2025-06-02 RX ORDER — INSULIN DEGLUDEC 200 U/ML
20 INJECTION, SOLUTION SUBCUTANEOUS DAILY
Qty: 9 ML | Refills: 0 | Status: SHIPPED | OUTPATIENT
Start: 2025-06-02

## 2025-06-21 ENCOUNTER — APPOINTMENT (OUTPATIENT)
Dept: GENERAL RADIOLOGY | Facility: HOSPITAL | Age: 85
End: 2025-06-21
Payer: MEDICARE

## 2025-06-21 ENCOUNTER — HOSPITAL ENCOUNTER (EMERGENCY)
Facility: HOSPITAL | Age: 85
Discharge: HOME | End: 2025-06-21
Attending: EMERGENCY MEDICINE
Payer: MEDICARE

## 2025-06-21 VITALS
RESPIRATION RATE: 16 BRPM | HEIGHT: 59 IN | BODY MASS INDEX: 29.23 KG/M2 | DIASTOLIC BLOOD PRESSURE: 78 MMHG | OXYGEN SATURATION: 98 % | HEART RATE: 61 BPM | SYSTOLIC BLOOD PRESSURE: 176 MMHG | TEMPERATURE: 98.3 F | WEIGHT: 145 LBS

## 2025-06-21 DIAGNOSIS — E16.2 HYPOGLYCEMIA: Primary | ICD-10-CM

## 2025-06-21 LAB
ALBUMIN SERPL-MCNC: 3.7 G/DL (ref 3.5–5.2)
ALBUMIN/GLOB SERPL: 1.2 G/DL
ALP SERPL-CCNC: 114 U/L (ref 39–117)
ALT SERPL W P-5'-P-CCNC: 17 U/L (ref 1–33)
ANION GAP SERPL CALCULATED.3IONS-SCNC: 8.6 MMOL/L (ref 5–15)
AST SERPL-CCNC: 17 U/L (ref 1–32)
BASOPHILS # BLD AUTO: 0.02 10*3/MM3 (ref 0–0.2)
BASOPHILS NFR BLD AUTO: 0.3 % (ref 0–1.5)
BILIRUB SERPL-MCNC: 0.4 MG/DL (ref 0–1.2)
BILIRUB UR QL STRIP: NEGATIVE
BUN SERPL-MCNC: 20 MG/DL (ref 8–23)
BUN/CREAT SERPL: 28.2 (ref 7–25)
CALCIUM SPEC-SCNC: 9 MG/DL (ref 8.6–10.5)
CHLORIDE SERPL-SCNC: 105 MMOL/L (ref 98–107)
CLARITY UR: CLEAR
CO2 SERPL-SCNC: 25.4 MMOL/L (ref 22–29)
COLOR UR: YELLOW
CREAT SERPL-MCNC: 0.71 MG/DL (ref 0.57–1)
DEPRECATED RDW RBC AUTO: 42.4 FL (ref 37–54)
EGFRCR SERPLBLD CKD-EPI 2021: 84 ML/MIN/1.73
EOSINOPHIL # BLD AUTO: 0.03 10*3/MM3 (ref 0–0.4)
EOSINOPHIL NFR BLD AUTO: 0.5 % (ref 0.3–6.2)
ERYTHROCYTE [DISTWIDTH] IN BLOOD BY AUTOMATED COUNT: 12.5 % (ref 12.3–15.4)
GEN 5 1HR TROPONIN T REFLEX: 8 NG/L
GLOBULIN UR ELPH-MCNC: 3.1 GM/DL
GLUCOSE BLDC GLUCOMTR-MCNC: 154 MG/DL (ref 70–130)
GLUCOSE BLDC GLUCOMTR-MCNC: 226 MG/DL (ref 70–130)
GLUCOSE SERPL-MCNC: 258 MG/DL (ref 65–99)
GLUCOSE UR STRIP-MCNC: ABNORMAL MG/DL
HCT VFR BLD AUTO: 39.8 % (ref 34–46.6)
HGB BLD-MCNC: 13 G/DL (ref 12–15.9)
HGB UR QL STRIP.AUTO: NEGATIVE
IMM GRANULOCYTES # BLD AUTO: 0.01 10*3/MM3 (ref 0–0.05)
IMM GRANULOCYTES NFR BLD AUTO: 0.2 % (ref 0–0.5)
KETONES UR QL STRIP: NEGATIVE
LEUKOCYTE ESTERASE UR QL STRIP.AUTO: NEGATIVE
LIPASE SERPL-CCNC: 16 U/L (ref 13–60)
LYMPHOCYTES # BLD AUTO: 0.89 10*3/MM3 (ref 0.7–3.1)
LYMPHOCYTES NFR BLD AUTO: 14 % (ref 19.6–45.3)
MCH RBC QN AUTO: 30 PG (ref 26.6–33)
MCHC RBC AUTO-ENTMCNC: 32.7 G/DL (ref 31.5–35.7)
MCV RBC AUTO: 91.9 FL (ref 79–97)
MONOCYTES # BLD AUTO: 0.39 10*3/MM3 (ref 0.1–0.9)
MONOCYTES NFR BLD AUTO: 6.1 % (ref 5–12)
NEUTROPHILS NFR BLD AUTO: 5.03 10*3/MM3 (ref 1.7–7)
NEUTROPHILS NFR BLD AUTO: 78.9 % (ref 42.7–76)
NITRITE UR QL STRIP: NEGATIVE
NRBC BLD AUTO-RTO: 0 /100 WBC (ref 0–0.2)
PH UR STRIP.AUTO: 7 [PH] (ref 5–8)
PLATELET # BLD AUTO: 200 10*3/MM3 (ref 140–450)
PMV BLD AUTO: 9.5 FL (ref 6–12)
POTASSIUM SERPL-SCNC: 4 MMOL/L (ref 3.5–5.2)
PROT SERPL-MCNC: 6.8 G/DL (ref 6–8.5)
PROT UR QL STRIP: NEGATIVE
QT INTERVAL: 419 MS
QTC INTERVAL: 474 MS
RBC # BLD AUTO: 4.33 10*6/MM3 (ref 3.77–5.28)
SODIUM SERPL-SCNC: 139 MMOL/L (ref 136–145)
SP GR UR STRIP: <=1.005 (ref 1–1.03)
TROPONIN T NUMERIC DELTA: 1 NG/L
TROPONIN T SERPL HS-MCNC: 7 NG/L
UROBILINOGEN UR QL STRIP: ABNORMAL
WBC NRBC COR # BLD AUTO: 6.37 10*3/MM3 (ref 3.4–10.8)

## 2025-06-21 PROCEDURE — 93005 ELECTROCARDIOGRAM TRACING: CPT | Performed by: EMERGENCY MEDICINE

## 2025-06-21 PROCEDURE — 99284 EMERGENCY DEPT VISIT MOD MDM: CPT

## 2025-06-21 PROCEDURE — 71045 X-RAY EXAM CHEST 1 VIEW: CPT

## 2025-06-21 PROCEDURE — 82948 REAGENT STRIP/BLOOD GLUCOSE: CPT

## 2025-06-21 PROCEDURE — 93010 ELECTROCARDIOGRAM REPORT: CPT | Performed by: INTERNAL MEDICINE

## 2025-06-21 PROCEDURE — 83690 ASSAY OF LIPASE: CPT | Performed by: EMERGENCY MEDICINE

## 2025-06-21 PROCEDURE — 85025 COMPLETE CBC W/AUTO DIFF WBC: CPT | Performed by: EMERGENCY MEDICINE

## 2025-06-21 PROCEDURE — 80053 COMPREHEN METABOLIC PANEL: CPT | Performed by: EMERGENCY MEDICINE

## 2025-06-21 PROCEDURE — 81003 URINALYSIS AUTO W/O SCOPE: CPT | Performed by: EMERGENCY MEDICINE

## 2025-06-21 PROCEDURE — 84484 ASSAY OF TROPONIN QUANT: CPT | Performed by: EMERGENCY MEDICINE

## 2025-06-21 PROCEDURE — 36415 COLL VENOUS BLD VENIPUNCTURE: CPT

## 2025-06-21 RX ORDER — SODIUM CHLORIDE 0.9 % (FLUSH) 0.9 %
10 SYRINGE (ML) INJECTION AS NEEDED
Status: DISCONTINUED | OUTPATIENT
Start: 2025-06-21 | End: 2025-06-21 | Stop reason: HOSPADM

## 2025-06-21 NOTE — ED PROVIDER NOTES
EMERGENCY DEPARTMENT ENCOUNTER    Room Number:  20/20  PCP: Gemini Catherine MD    HPI:  Chief Complaint: Diaphoresis and generalized weakness  A complete HPI/ROS/PMH/PSH/SH/FH are unobtainable due to: None  Context: Antonietta Gramajo is a 84 y.o. female who presents to the ED c/o acute diaphoresis and generalized weakness that occurred around 8 AM today.  It lasted for several minutes and she checked her blood sugar and it was low for her at 77.  EMS was called by the patient's daughter.  She states that she did not have any confusion.  She did not like she was going to pass out.  She reports having normal vision.  No chest pain or shortness of breath.  She states that she feels well at this time.        PAST MEDICAL HISTORY  Active Ambulatory Problems     Diagnosis Date Noted    Allergic rhinitis 09/16/2016    GERD without esophagitis 09/16/2016    Essential hypertension 09/16/2016    Osteoarthritis of knee 09/16/2016    Type 2 diabetes mellitus with hyperglycemia, with long-term current use of insulin 09/16/2016    Open-angle glaucoma 02/14/2017    Nonproliferative diabetic retinopathy of both eyes 04/10/2017    Hyperlipidemia 02/06/2018    Long-term insulin use 02/06/2018    Mild intermittent asthma without complication 03/06/2018    Osteoarthritis of multiple joints 09/11/2018    Vitamin D deficiency 09/18/2018    Diabetic autonomic neuropathy associated with type 2 diabetes mellitus 09/18/2018    Neutropenia 07/19/2020    Paroxysmal atrial fibrillation 07/22/2020    Pancytopenia 07/22/2020    Dysfunctional grieving 12/06/2021    Acute lower GI bleeding 12/26/2022    Stage 3a chronic kidney disease 12/26/2022    Acute blood loss anemia 12/26/2022    Abnormal CT of the abdomen 12/26/2022    Left hip pain 12/26/2022    Pain in both lower extremities 12/26/2022     Resolved Ambulatory Problems     Diagnosis Date Noted    Avitaminosis D 09/16/2016    Hypervitaminosis D  10/31/2017    Acute UTI 07/19/2020     Hyponatremia 07/19/2020    ODALYS (acute kidney injury) 07/19/2020    Sepsis, unspecified organism 07/19/2020     Past Medical History:   Diagnosis Date    Cataract     Chest pain     Diverticulitis     Fibromyalgia     Skin cancer     Type 2 diabetes mellitus          PAST SURGICAL HISTORY  Past Surgical History:   Procedure Laterality Date    ANKLE SURGERY Right     metal    CATARACT EXTRACTION      CHOLECYSTECTOMY      CLEFT PALATE REPAIR      COLONOSCOPY N/A 12/27/2022    Procedure: COLONOSCOPY INTO CECUM AND T.I. WITH APC, COLD SNARE POLYPECTOMY, COLD BIOPSIES AND CLIP PLACEMENT X1;  Surgeon: Scott Dale MD;  Location: [facility] ENDOSCOPY;  Service: Gastroenterology;  Laterality: N/A;  PRE- HEMATOCHEZIA  POST- DIVERTICULOSIS, POLYP, RECTOSIGMOID LESION, AVM, INTERNAL HEMORRHOIDS    FOOT SURGERY Left     metal    HYSTERECTOMY      TONSILLECTOMY           FAMILY HISTORY  Family History   Problem Relation Age of Onset    Stroke Mother     Diabetes Mother     Heart attack Father     Breast cancer Sister     Hypertension Sister     Lung cancer Brother     Hypertension Brother     Heart attack Brother          SOCIAL HISTORY  Social History     Socioeconomic History    Marital status:    Tobacco Use    Smoking status: Never    Smokeless tobacco: Never   Vaping Use    Vaping status: Never Used   Substance and Sexual Activity    Alcohol use: No    Drug use: No    Sexual activity: Defer         ALLERGIES  Sulfa antibiotics, Atorvastatin, Cycloset [bromocriptine], Invokana [canagliflozin], Oxycodone-acetaminophen, Propoxyphene-acetaminophen, and Victoza [liraglutide]        REVIEW OF SYSTEMS  Review of Systems     Included in HPI  All systems reviewed and negative except for those discussed in HPI.       PHYSICAL EXAM  ED Triage Vitals [06/21/25 1138]   Temp Heart Rate Resp BP SpO2   98.3 °F (36.8 °C) 76 16 176/78 98 %      Temp src Heart Rate Source Patient Position BP Location FiO2 (%)   -- -- -- -- --        Physical Exam      GENERAL: no acute distress, spry  HENT: nares patent  EYES: no scleral icterus  CV: regular rhythm, normal rate  RESPIRATORY: normal effort, clear to auscultation bilaterally  ABDOMEN: soft, nontender  MUSCULOSKELETAL: no deformity  NEURO:   Recent and remote memory functions are normal. The patient is attentive with normal concentration. Language is fluent. Speech is clear. The speech is non-dysarthric. Fund of knowledge is normal.   Symmetric smile with no facial droop.  Eyes close shut strongly bilaterally.  Symmetric eyebrow raise bilaterally.  EOMI, PERRL  CN II-XII grossly normal otherwise.  5/5 strength to extremities.  No pronator drift.  Intact FNF.  No meningismus.  PSYCH:  calm, cooperative  SKIN: warm, dry    Vital signs and nursing notes reviewed.          LAB RESULTS  Recent Results (from the past 24 hours)   POC Glucose Once    Collection Time: 06/21/25 11:41 AM    Specimen: Blood   Result Value Ref Range    Glucose 226 (H) 70 - 130 mg/dL   Comprehensive Metabolic Panel    Collection Time: 06/21/25 11:58 AM    Specimen: Hand, Right; Blood   Result Value Ref Range    Glucose 258 (H) 65 - 99 mg/dL    BUN 20.0 8.0 - 23.0 mg/dL    Creatinine 0.71 0.57 - 1.00 mg/dL    Sodium 139 136 - 145 mmol/L    Potassium 4.0 3.5 - 5.2 mmol/L    Chloride 105 98 - 107 mmol/L    CO2 25.4 22.0 - 29.0 mmol/L    Calcium 9.0 8.6 - 10.5 mg/dL    Total Protein 6.8 6.0 - 8.5 g/dL    Albumin 3.7 3.5 - 5.2 g/dL    ALT (SGPT) 17 1 - 33 U/L    AST (SGOT) 17 1 - 32 U/L    Alkaline Phosphatase 114 39 - 117 U/L    Total Bilirubin 0.4 0.0 - 1.2 mg/dL    Globulin 3.1 gm/dL    A/G Ratio 1.2 g/dL    BUN/Creatinine Ratio 28.2 (H) 7.0 - 25.0    Anion Gap 8.6 5.0 - 15.0 mmol/L    eGFR 84.0 >60.0 mL/min/1.73   Lipase    Collection Time: 06/21/25 11:58 AM    Specimen: Hand, Right; Blood   Result Value Ref Range    Lipase 16 13 - 60 U/L   CBC Auto Differential    Collection Time: 06/21/25 11:58 AM    Specimen: Hand,  Right; Blood   Result Value Ref Range    WBC 6.37 3.40 - 10.80 10*3/mm3    RBC 4.33 3.77 - 5.28 10*6/mm3    Hemoglobin 13.0 12.0 - 15.9 g/dL    Hematocrit 39.8 34.0 - 46.6 %    MCV 91.9 79.0 - 97.0 fL    MCH 30.0 26.6 - 33.0 pg    MCHC 32.7 31.5 - 35.7 g/dL    RDW 12.5 12.3 - 15.4 %    RDW-SD 42.4 37.0 - 54.0 fl    MPV 9.5 6.0 - 12.0 fL    Platelets 200 140 - 450 10*3/mm3    Neutrophil % 78.9 (H) 42.7 - 76.0 %    Lymphocyte % 14.0 (L) 19.6 - 45.3 %    Monocyte % 6.1 5.0 - 12.0 %    Eosinophil % 0.5 0.3 - 6.2 %    Basophil % 0.3 0.0 - 1.5 %    Immature Grans % 0.2 0.0 - 0.5 %    Neutrophils, Absolute 5.03 1.70 - 7.00 10*3/mm3    Lymphocytes, Absolute 0.89 0.70 - 3.10 10*3/mm3    Monocytes, Absolute 0.39 0.10 - 0.90 10*3/mm3    Eosinophils, Absolute 0.03 0.00 - 0.40 10*3/mm3    Basophils, Absolute 0.02 0.00 - 0.20 10*3/mm3    Immature Grans, Absolute 0.01 0.00 - 0.05 10*3/mm3    nRBC 0.0 0.0 - 0.2 /100 WBC   High Sensitivity Troponin T    Collection Time: 06/21/25 11:58 AM    Specimen: Hand, Right; Blood   Result Value Ref Range    HS Troponin T 7 <14 ng/L   ECG 12 Lead Altered Mental Status    Collection Time: 06/21/25 12:25 PM   Result Value Ref Range    QT Interval 419 ms    QTC Interval 474 ms   Urinalysis With Microscopic If Indicated (No Culture) - Urine, Clean Catch    Collection Time: 06/21/25  1:48 PM    Specimen: Urine, Clean Catch   Result Value Ref Range    Color, UA Yellow Yellow, Straw    Appearance, UA Clear Clear    pH, UA 7.0 5.0 - 8.0    Specific Gravity, UA <=1.005 1.005 - 1.030    Glucose,  mg/dL (2+) (A) Negative    Ketones, UA Negative Negative    Bilirubin, UA Negative Negative    Blood, UA Negative Negative    Protein, UA Negative Negative    Leuk Esterase, UA Negative Negative    Nitrite, UA Negative Negative    Urobilinogen, UA 0.2 E.U./dL 0.2 - 1.0 E.U./dL   High Sensitivity Troponin T 1Hr    Collection Time: 06/21/25  2:20 PM    Specimen: Arm, Right; Blood   Result Value Ref Range     HS Troponin T 8 <14 ng/L    Troponin T Numeric Delta 1 Abnormal if >/=3 ng/L       Ordered the above labs and reviewed the results.        RADIOLOGY  XR Chest 1 View  Result Date: 6/21/2025  XR CHEST 1 VW-   INDICATION: Altered mental status  COMPARISON: Chest radiograph July 18, 2020  TECHNIQUE: 1 view chest  FINDINGS:  Calcified pulmonary nodule in the right midlung, consistent with prior granulomatous infection. No focal opacity. No effusions. Low volumes. Stable mediastinum. Heart is normal in size.      No acute cardiopulmonary process  This report was finalized on 6/21/2025 1:01 PM by Dr. Drew Cameron M.D on Workstation: [id number]        Ordered the above noted radiological studies. Reviewed by me in PACS.        MEDICATIONS GIVEN IN ER  Medications   sodium chloride 0.9 % flush 10 mL (has no administration in time range)         ORDERS PLACED DURING THIS VISIT:  Orders Placed This Encounter   Procedures    XR Chest 1 View    Comprehensive Metabolic Panel    Lipase    Urinalysis With Microscopic If Indicated (No Culture) - Urine, Clean Catch    CBC Auto Differential    High Sensitivity Troponin T    High Sensitivity Troponin T 1Hr    Monitor Blood Pressure    Continuous Pulse Oximetry    POC Glucose Once    ECG 12 Lead Altered Mental Status    Telemetry Scan    Insert Peripheral IV    CBC & Differential         OUTPATIENT MEDICATION MANAGEMENT:  Current Facility-Administered Medications Ordered in Epic   Medication Dose Route Frequency Provider Last Rate Last Admin    sodium chloride 0.9 % flush 10 mL  10 mL Intravenous PRN Azeem Eason II, MD         Current Outpatient Medications Ordered in Epic   Medication Sig Dispense Refill    albuterol sulfate HFA (ProAir HFA) 108 (90 Base) MCG/ACT inhaler Inhale 1 puff Every 6 (Six) Hours As Needed for Wheezing. 18 g 0    amLODIPine (NORVASC) 5 MG tablet TAKE 1 TABLET BY MOUTH DAILY 90 tablet 1    Aspirin 81 MG capsule Take 81 mg by mouth Daily.      B  Complex Vitamins (VITAMIN-B COMPLEX PO) Take  by mouth.      esomeprazole (nexIUM) 20 MG capsule Take 1 capsule by mouth Every Morning Before Breakfast.      glucose blood test strip Check blood glucose 3-4 times dialy 100 each 4    Insulin Lispro, 1 Unit Dial, (HumaLOG KwikPen) 100 UNIT/ML solution pen-injector Use as directed for meal & correction coverage up to 50 units a day. 6 units plus scale based on glucose of 1 units :30 mg/dl glucose over 140 mg/dl glucose 45 mL 2    Insulin Pen Needle (B-D UF III MINI PEN NEEDLES) 31G X 5 MM misc Use 4 times daily 400 each 1    lisinopril (PRINIVIL,ZESTRIL) 40 MG tablet TAKE ONE TABLET BY MOUTH DAILY 90 tablet 1    metFORMIN (GLUCOPHAGE) 1000 MG tablet Take 1 tablet by mouth Daily With Breakfast. 90 tablet 1    Omeprazole 20 MG tablet delayed-release Take  by mouth.      ONETOUCH DELICA LANCETS 33G misc Check blood glucose 3 times daily DX CODE: e11.65 100 each 5    Tresiba FlexTouch 200 UNIT/ML solution pen-injector pen injection Inject 20 Units under the skin into the appropriate area as directed Daily. 9 mL 0    Triamcinolone Acetonide (NASACORT) 55 MCG/ACT nasal inhaler 2 sprays into the nostril(s) as directed by provider Daily As Needed (allergies). 16.5 g 5       PROCEDURES  Procedures          MEDICAL DECISION MAKING, PROGRESS, and CONSULTS    Discussion below represents my analysis of pertinent findings related to patient's condition, differential diagnosis, treatment plan and final disposition.      Additional sources:  - Discussed/obtained information from independent historians: Daughter and son are at bedside.  They state the patient has been having symptoms of being sweaty and having symptomatology of hypoglycemia frequently in the mornings.  Patient is a very poor patient according to son and daughter.  I then talked to the patient further and she states that she takes 20 units of Tresiba at night.  Given the uncertainty regarding her blood sugar in the  mornings (patient had a glucose of 77 this morning which was after she had had a full bottle of Coke and other foods to try to improve her symptomatology), I asked her to have her Tresiba to 10 units and her family will help her check her sugars in the morning and then week they will make further adjustments to titrate.  She has an appointment scheduled with endocrinology in 2 weeks.  She can follow-up with her PCP sooner.  Additional information was obtained to confirm the patient's history.          Differential diagnosis:    Cardiac arrhythmia, hypoglycemia, other electrolyte abnormality, near syncope, volume depletion, underlying infection, TIA although she denies ever having any confusion or neurological symptoms I confirmed after reviewing the triage note               Independent interpretation of labs, radiology studies, and discussions with consultants:  ED Course as of 06/21/25 1708   Sat Jun 21, 2025   1228 EKG independently interpreted by myself.  Time 12:25 PM.  Sinus rhythm.  Heart rate 77.  Normal intervals and axis.  Low voltage in the precordial leads.  No acute ST abnormality. [TD]   1438 HS Troponin T: 7 [TD]   1642 Troponin T Numeric Delta: 1 [TD]   1642 Leukocytes, UA: Negative [TD]   1642 Nitrite, UA: Negative [TD]      ED Course User Index  [TD] Azeem Eason II, MD             Clinical Scores:                                   DIAGNOSIS  Final diagnoses:   Hypoglycemia         DISPOSITION  DISCHARGE    FOLLOW-UP  Gemini Catherine MD  [address]  [address] [place], [place] [id number] [phone]    Schedule an appointment as soon as possible for a visit in 2 days  to review your Tresiba dosing (or you can talk to your endocrinologist)         Medication List      No changes were made to your prescriptions during this visit.             Latest Documented Vital Signs:  As of 17:08 EDT  BP- 176/78 HR- 61 Temp- 98.3 °F (36.8 °C) O2 sat- 98%      --    Please note that portions of this  were completed with a voice recognition program.       Note Disclaimer: At [facility], we believe that sharing information builds trust and better relationships. You are receiving this note because you are receiving care at [facility] or recently visited. It is possible you will see health information before a provider has talked with you about it. This kind of information can be easy to misunderstand. To help you fully understand what it means for your health, we urge you to discuss this note with your provider.         Azeem Eason II, MD  06/21/25 6037

## 2025-06-24 ENCOUNTER — OFFICE VISIT (OUTPATIENT)
Dept: INTERNAL MEDICINE | Facility: CLINIC | Age: 85
End: 2025-06-24
Payer: MEDICARE

## 2025-06-24 ENCOUNTER — TELEPHONE (OUTPATIENT)
Dept: ENDOCRINOLOGY | Age: 85
End: 2025-06-24
Payer: MEDICARE

## 2025-06-24 VITALS
TEMPERATURE: 96.6 F | BODY MASS INDEX: 27.42 KG/M2 | HEIGHT: 59 IN | HEART RATE: 101 BPM | WEIGHT: 136 LBS | SYSTOLIC BLOOD PRESSURE: 170 MMHG | DIASTOLIC BLOOD PRESSURE: 75 MMHG | OXYGEN SATURATION: 98 %

## 2025-06-24 DIAGNOSIS — E11.65 TYPE 2 DIABETES MELLITUS WITH HYPERGLYCEMIA, WITH LONG-TERM CURRENT USE OF INSULIN: Primary | ICD-10-CM

## 2025-06-24 DIAGNOSIS — Z79.4 TYPE 2 DIABETES MELLITUS WITH HYPERGLYCEMIA, WITH LONG-TERM CURRENT USE OF INSULIN: Primary | ICD-10-CM

## 2025-06-24 DIAGNOSIS — E16.2 HYPOGLYCEMIA: ICD-10-CM

## 2025-06-24 DIAGNOSIS — I10 ESSENTIAL HYPERTENSION: ICD-10-CM

## 2025-06-24 PROCEDURE — 1125F AMNT PAIN NOTED PAIN PRSNT: CPT | Performed by: FAMILY MEDICINE

## 2025-06-24 PROCEDURE — 99214 OFFICE O/P EST MOD 30 MIN: CPT | Performed by: FAMILY MEDICINE

## 2025-06-24 PROCEDURE — G2211 COMPLEX E/M VISIT ADD ON: HCPCS | Performed by: FAMILY MEDICINE

## 2025-06-24 PROCEDURE — 3077F SYST BP >= 140 MM HG: CPT | Performed by: FAMILY MEDICINE

## 2025-06-24 PROCEDURE — 3078F DIAST BP <80 MM HG: CPT | Performed by: FAMILY MEDICINE

## 2025-06-24 NOTE — PROGRESS NOTES
Subjective   Antonietta Gramajo is a 85 y.o. female.   Chief Complaint   Patient presents with    Hospital Follow Up Visit       History of Present Illness     Patient was seen in our office last time in July 2024.    Patient is here today with her son to follow-up on ER visit.  History provided by patient and her son.    Diabetes/hypoglycemia -patient was evaluated in ER on 6/21/2025 for hypoglycemia.  She woke up weak and sweating.  She took candy and Coke and checked sugars 15 minutes later -it was 77.  EMS was called.  In hospital blood sugar was 226.  Creatinine 0.71, GFR 84.  CBC normal, UA negative.  LFTs normal.  Chest x-ray negative for acute changes.  Patient was on Tresiba 20 units at bedtime.  Dose was decreased in ER to 10 units at bedtime.  Patient takes it as recommended.  Morning sugar was at 58 the day after discharge from hospital, 154 yesterday and 173 today.  She did not have any low symptomatic sugars in the last 2 days.  She did not call her endocrinologist yet.  She is planning to do it today.    Hypertension - on lisinopril 40 mg a day and amlodipine 5 mg a day.  She takes lisinopril in the mornings, amlodipine in the evenings.  She forgets to take it sometimes, she thinks on average twice a week.  She took lisinopril this morning.  She thinks she took amlodipine last night.      Review of Systems   Respiratory: Negative.     Cardiovascular: Negative.          Objective   Wt Readings from Last 3 Encounters:   06/24/25 61.7 kg (136 lb)   06/21/25 65.8 kg (145 lb)   09/27/24 63.9 kg (140 lb 12.8 oz)      Vitals:    06/24/25 1046   BP: 170/75   Pulse: 101   Temp: 96.6 °F (35.9 °C)   SpO2: 98%     Temp Readings from Last 3 Encounters:   06/24/25 96.6 °F (35.9 °C)   06/21/25 98.3 °F (36.8 °C)   07/01/24 98 °F (36.7 °C)     BP Readings from Last 3 Encounters:   06/24/25 170/75   06/21/25 176/78   09/27/24 162/94     Pulse Readings from Last 3 Encounters:   06/24/25 101   06/21/25 61   09/27/24 109      Body mass index is 27.45 kg/m².    Physical Exam  Constitutional:       Appearance: She is well-developed.   Neck:      Vascular: No carotid bruit.   Cardiovascular:      Rate and Rhythm: Normal rate and regular rhythm.      Heart sounds: Murmur heard.   Pulmonary:      Effort: Pulmonary effort is normal.      Breath sounds: Normal breath sounds.   Skin:     General: Skin is warm and dry.   Neurological:      Mental Status: She is alert.       Assessment & Plan   Diagnoses and all orders for this visit:    1. Type 2 diabetes mellitus with hyperglycemia, with long-term current use of insulin (Primary)    2. Essential hypertension    3. Hypoglycemia        Diabetes/hypoglycemia-patient is advised to have glucagon tablets available always to be used for low symptomatic sugars.  She will follow-up with endocrinologist closely.  For now continue Tresiba at 10 units QHS.  Patient is planning to call her endocrinologist today.  She is scheduled in a few days.    [id number] is not clear if patient took amlodipine yesterday.  She will use a pillbox.  She will record if she forgets pills.  Will continue lisinopril and amlodipine.  She will come back in 1 month with all medications except of insulin and will bring blood pressure log/blood pressure cuff.    Patient did not see her cardiologist since 2021.  She is reminded to schedule appointment .

## 2025-06-24 NOTE — TELEPHONE ENCOUNTER
Pt called and left VM. Pt stated she in the emergency department over the weekend and her insulin dose was adjusted. Pt would like Dr. Hunt to review notes and ensure she is on correct dose.     Salima Mccain MA  6/24/2025  15:20 EDT

## 2025-06-24 NOTE — TELEPHONE ENCOUNTER
I think it is fine for her to stay at the dose she was told at the emergency department until her appointment.  If she could bring blood sugar readings to her appointment it would help me to know what dose of insulin is appropriate.

## 2025-06-25 NOTE — TELEPHONE ENCOUNTER
Called and spoke with pt. Informed pt of Dr. Hunt message. Pt voiced understanding and had no further questions or concerns.

## 2025-07-07 ENCOUNTER — OFFICE VISIT (OUTPATIENT)
Dept: ENDOCRINOLOGY | Age: 85
End: 2025-07-07
Payer: MEDICARE

## 2025-07-07 VITALS
DIASTOLIC BLOOD PRESSURE: 84 MMHG | HEART RATE: 108 BPM | SYSTOLIC BLOOD PRESSURE: 126 MMHG | HEIGHT: 59 IN | WEIGHT: 138.4 LBS | OXYGEN SATURATION: 99 % | BODY MASS INDEX: 27.9 KG/M2

## 2025-07-07 DIAGNOSIS — E11.65 TYPE 2 DIABETES MELLITUS WITH HYPERGLYCEMIA, WITH LONG-TERM CURRENT USE OF INSULIN: Primary | ICD-10-CM

## 2025-07-07 DIAGNOSIS — Z79.4 TYPE 2 DIABETES MELLITUS WITH HYPERGLYCEMIA, WITH LONG-TERM CURRENT USE OF INSULIN: Primary | ICD-10-CM

## 2025-07-07 PROCEDURE — 99214 OFFICE O/P EST MOD 30 MIN: CPT | Performed by: INTERNAL MEDICINE

## 2025-07-07 PROCEDURE — G2211 COMPLEX E/M VISIT ADD ON: HCPCS | Performed by: INTERNAL MEDICINE

## 2025-07-07 PROCEDURE — 3079F DIAST BP 80-89 MM HG: CPT | Performed by: INTERNAL MEDICINE

## 2025-07-07 PROCEDURE — 3074F SYST BP LT 130 MM HG: CPT | Performed by: INTERNAL MEDICINE

## 2025-07-07 PROCEDURE — 1160F RVW MEDS BY RX/DR IN RCRD: CPT | Performed by: INTERNAL MEDICINE

## 2025-07-07 PROCEDURE — 1159F MED LIST DOCD IN RCRD: CPT | Performed by: INTERNAL MEDICINE

## 2025-07-07 NOTE — PROGRESS NOTES
Chief complaint/Reason for consult: T2DM    HPI:   - 85 year old female here for management of diabetes mellitus type 2  - Last since 9/2024  - Had an ER visit for hypoglycemia  - Has had diabetes for 6-7 years  - Complications include peripheral neuropathy  - Is currently taking metformin (she only takes 500 mg bid sometimes due to diarrhea) and Tresiba 10 units daily   - Complains of hypoglycemia in the morning before breakfast    The following portions of the patient's history were reviewed and updated as appropriate: allergies, current medications, past family history, past medical history, past social history, past surgical history, and problem list.      Objective     Vitals:    07/07/25 1452   BP: 126/84   Pulse: 108   SpO2: 99%        Physical Exam  Vitals reviewed.   Constitutional:       Appearance: Normal appearance.   HENT:      Head: Normocephalic and atraumatic.   Eyes:      General: No scleral icterus.  Pulmonary:      Effort: Pulmonary effort is normal. No respiratory distress.   Neurological:      Mental Status: She is alert.      Gait: Gait normal.   Psychiatric:         Mood and Affect: Mood normal.         Behavior: Behavior normal.         Thought Content: Thought content normal.         Judgment: Judgment normal.         Assessment & Plan   1. Type 2 DM, uncontrolled due to hypo- and hyperglycemia, complicated by peripheral neuropathy  - She is not at goal for her glycemic control  - Cont. metformin (she only takes 500 mg bid sometimes due to diarrhea) and   - Cont. Tresiba to 10 units daily  - Check BG 3 times daily     - Return to clinic in 6 months

## 2025-07-25 ENCOUNTER — OFFICE VISIT (OUTPATIENT)
Dept: INTERNAL MEDICINE | Facility: CLINIC | Age: 85
End: 2025-07-25
Payer: MEDICARE

## 2025-07-25 VITALS
HEIGHT: 59 IN | WEIGHT: 138 LBS | OXYGEN SATURATION: 96 % | SYSTOLIC BLOOD PRESSURE: 120 MMHG | DIASTOLIC BLOOD PRESSURE: 80 MMHG | BODY MASS INDEX: 27.82 KG/M2 | HEART RATE: 78 BPM | TEMPERATURE: 97.2 F

## 2025-07-25 DIAGNOSIS — J45.20 MILD INTERMITTENT ASTHMA WITHOUT COMPLICATION: ICD-10-CM

## 2025-07-25 DIAGNOSIS — R53.83 FATIGUE, UNSPECIFIED TYPE: ICD-10-CM

## 2025-07-25 DIAGNOSIS — I10 ESSENTIAL HYPERTENSION: Primary | ICD-10-CM

## 2025-07-25 RX ORDER — ALBUTEROL SULFATE 90 UG/1
1 INHALANT RESPIRATORY (INHALATION) EVERY 6 HOURS PRN
Qty: 18 G | Refills: 0 | Status: SHIPPED | OUTPATIENT
Start: 2025-07-25

## 2025-07-25 NOTE — PROGRESS NOTES
Subjective   Antonietta Gramajo is a 85 y.o. female.   Chief Complaint   Patient presents with    Hypertension    Asthma       History of Present Illness     Hypertension - on lisinopril 40 mg a day and amlodipine 5 mg a day.  She takes lisinopril in the mornings, amlodipine in the evenings.  She says that she takes it almost every day.  No chest pain, no shortness of breath, no palpitations, no lightheadedness.  Creatinine at 0.71, GFR 84.0.    Asthma -she has intermittent asthma.  She uses albuterol as needed. She uses it on average 2-3 times a week.  She is requesting refill on albuterol.  No cough, no shortness of breath or wheezing.  She uses nasal sprays to help with postnasal drainage as needed.    Patient feels cold all the time, she complains that her hair is falling, energy is low.  She is interested in having her thyroid test.  She thinks that she takes biotin.    Review of Systems   Respiratory:  Negative for shortness of breath.    Cardiovascular:  Negative for chest pain and palpitations.   Neurological:  Negative for light-headedness.         Objective   Wt Readings from Last 3 Encounters:   07/25/25 62.6 kg (138 lb)   07/07/25 62.8 kg (138 lb 6.4 oz)   06/24/25 61.7 kg (136 lb)     Vitals:    07/25/25 1058   BP: 120/80   Pulse: 78   Temp: 97.2 °F (36.2 °C)   SpO2: 96%     Temp Readings from Last 3 Encounters:   07/25/25 97.2 °F (36.2 °C)   06/24/25 96.6 °F (35.9 °C)   06/21/25 98.3 °F (36.8 °C)     BP Readings from Last 3 Encounters:   07/25/25 120/80   07/07/25 126/84   06/24/25 170/75     Pulse Readings from Last 3 Encounters:   07/25/25 78   07/07/25 108   06/24/25 101     Body mass index is 27.86 kg/m².    Physical Exam  Constitutional:       Appearance: She is well-developed.   Neck:      Vascular: No carotid bruit.   Cardiovascular:      Rate and Rhythm: Normal rate and regular rhythm.      Heart sounds: Normal heart sounds.   Pulmonary:      Effort: Pulmonary effort is normal.      Breath sounds:  Normal breath sounds.   Skin:     General: Skin is warm and dry.   Neurological:      Mental Status: She is alert.         Assessment & Plan   Diagnoses and all orders for this visit:    1. Essential hypertension (Primary)    2. Mild intermittent asthma without complication    3. Fatigue, unspecified type  -     Thyroid Cascade Profile; Future    Other orders  -     albuterol sulfate HFA (ProAir HFA) 108 (90 Base) MCG/ACT inhaler; Inhale 1 puff Every 6 (Six) Hours As Needed for Wheezing.  Dispense: 18 g; Refill: 0        Hypertension-continue current treatment.  Follow-up in 6 months  Asthma-continue current treatment.  Follow-up in 12 months, sooner if problems.  Cold sensation/fatigue-will check thyroid test.  If she takes biotin she will hold it for a week prior to the test.

## 2025-07-30 RX ORDER — LISINOPRIL 40 MG/1
40 TABLET ORAL DAILY
Qty: 90 TABLET | Refills: 1 | Status: SHIPPED | OUTPATIENT
Start: 2025-07-30

## 2025-07-30 NOTE — TELEPHONE ENCOUNTER
Caller: Pari Freitas    Relationship: Emergency Contact    Best call back number:     [phone] (Mobile)       Requested Prescriptions:   Requested Prescriptions     Pending Prescriptions Disp Refills    lisinopril (PRINIVIL,ZESTRIL) 40 MG tablet 90 tablet 1     Sig: Take 1 tablet by mouth Daily.        Pharmacy where request should be sent: [facility] [id number] [facility] [id number] [place] [address] AT [facility] [phone] [facility] [phone] FX     Last office visit with prescribing clinician: 7/25/2025   Last telemedicine visit with prescribing clinician: Visit date not found   Next office visit with prescribing clinician: 1/26/2026     Additional details provided by patient: COMPLETELY OUT OF MEDICATION     Does the patient have less than a 3 day supply:  [x] Yes  [] No    Would you like a call back once the refill request has been completed: [] Yes [x] No    If the office needs to give you a call back, can they leave a voicemail: [] Yes [x] No    Yaritza Donnelly Rep   07/30/25 11:46 EDT

## (undated) DEVICE — TUBING, SUCTION, 1/4" X 10', STRAIGHT: Brand: MEDLINE

## (undated) DEVICE — SNAR POLYP CAPTIVATOR RND STFF 2.4 240CM 10MM 1P/U

## (undated) DEVICE — THE SINGLE USE ETRAP – POLYP TRAP IS USED FOR SUCTION RETRIEVAL OF ENDOSCOPICALLY REMOVED POLYPS.: Brand: ETRAP

## (undated) DEVICE — KT ORCA ORCAPOD DISP STRL

## (undated) DEVICE — LN SMPL CO2 SHTRM SD STREAM W/M LUER

## (undated) DEVICE — SENSR O2 OXIMAX FNGR A/ 18IN NONSTR

## (undated) DEVICE — APC PROBE 2200 A, SINGLE USE OD 2.3MM/6.9FR; L. 2.2M/7.2FT: Brand: ERBE

## (undated) DEVICE — CANN O2 ETCO2 FITS ALL CONN CO2 SMPL A/ 7IN DISP LF

## (undated) DEVICE — ADAPT CLN BIOGUARD AIR/H2O DISP